# Patient Record
Sex: FEMALE | Race: WHITE | Employment: FULL TIME | URBAN - METROPOLITAN AREA
[De-identification: names, ages, dates, MRNs, and addresses within clinical notes are randomized per-mention and may not be internally consistent; named-entity substitution may affect disease eponyms.]

---

## 2017-04-11 PROBLEM — R29.898 WEAKNESS OF LEFT LEG: Status: ACTIVE | Noted: 2017-04-11

## 2017-05-22 PROBLEM — T85.848A PAIN FROM IMPLANTED HARDWARE, INITIAL ENCOUNTER: Status: ACTIVE | Noted: 2017-05-22

## 2017-09-07 PROBLEM — T85.848D PAIN FROM IMPLANTED HARDWARE, SUBSEQUENT ENCOUNTER: Status: ACTIVE | Noted: 2017-09-07

## 2018-02-11 ENCOUNTER — APPOINTMENT (OUTPATIENT)
Dept: CT IMAGING | Facility: HOSPITAL | Age: 38
End: 2018-02-11
Attending: EMERGENCY MEDICINE
Payer: MEDICAID

## 2018-02-11 ENCOUNTER — HOSPITAL ENCOUNTER (EMERGENCY)
Facility: HOSPITAL | Age: 38
Discharge: HOME | End: 2018-02-11
Attending: EMERGENCY MEDICINE
Payer: MEDICAID

## 2018-02-11 VITALS
OXYGEN SATURATION: 98 % | BODY MASS INDEX: 31.28 KG/M2 | HEART RATE: 82 BPM | HEIGHT: 62 IN | RESPIRATION RATE: 16 BRPM | TEMPERATURE: 99 F | DIASTOLIC BLOOD PRESSURE: 78 MMHG | SYSTOLIC BLOOD PRESSURE: 120 MMHG | WEIGHT: 170 LBS

## 2018-02-11 DIAGNOSIS — S16.1XXA STRAIN OF NECK MUSCLE, INITIAL ENCOUNTER: ICD-10-CM

## 2018-02-11 DIAGNOSIS — S02.2XXA CLOSED FRACTURE OF NASAL BONE, INITIAL ENCOUNTER: ICD-10-CM

## 2018-02-11 DIAGNOSIS — S40.021A CONTUSION OF MULTIPLE SITES OF RIGHT UPPER EXTREMITY, INITIAL ENCOUNTER: ICD-10-CM

## 2018-02-11 DIAGNOSIS — S80.11XA CONTUSION OF LOWER EXTREMITY, RIGHT, INITIAL ENCOUNTER: ICD-10-CM

## 2018-02-11 DIAGNOSIS — S00.83XA FACIAL CONTUSION, INITIAL ENCOUNTER: Primary | ICD-10-CM

## 2018-02-11 DIAGNOSIS — S39.012A LOW BACK STRAIN, INITIAL ENCOUNTER: ICD-10-CM

## 2018-02-11 LAB
POCT LOT NUMBER: NORMAL
POCT URINE PREGNANCY: NEGATIVE

## 2018-02-11 PROCEDURE — 70486 CT MAXILLOFACIAL W/O DYE: CPT | Performed by: EMERGENCY MEDICINE

## 2018-02-11 PROCEDURE — 99284 EMERGENCY DEPT VISIT MOD MDM: CPT

## 2018-02-11 PROCEDURE — 76377 3D RENDER W/INTRP POSTPROCES: CPT | Performed by: EMERGENCY MEDICINE

## 2018-02-11 PROCEDURE — 81025 URINE PREGNANCY TEST: CPT

## 2018-02-11 PROCEDURE — 70450 CT HEAD/BRAIN W/O DYE: CPT | Performed by: EMERGENCY MEDICINE
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
## 2018-03-09 ENCOUNTER — HOSPITAL (OUTPATIENT)
Dept: OTHER | Age: 38
End: 2018-03-09
Attending: EMERGENCY MEDICINE

## 2018-03-09 LAB
ALBUMIN SERPL-MCNC: 3.8 GM/DL (ref 3.6–5.1)
ALBUMIN/GLOB SERPL: 1 {RATIO} (ref 1–2.4)
ALP SERPL-CCNC: 56 UNIT/L (ref 45–117)
ALT SERPL-CCNC: 23 UNIT/L
AMORPH SED URNS QL MICRO: ABNORMAL
ANALYZER ANC (IANC): NORMAL
ANION GAP SERPL CALC-SCNC: 14 MMOL/L (ref 10–20)
APPEARANCE UR: ABNORMAL
AST SERPL-CCNC: 21 UNIT/L
BACTERIA #/AREA URNS HPF: ABNORMAL /HPF
BASOPHILS # BLD: 0 THOUSAND/MCL (ref 0–0.3)
BASOPHILS NFR BLD: 0 %
BILIRUB SERPL-MCNC: 0.5 MG/DL (ref 0.2–1)
BILIRUB UR QL STRIP: ABNORMAL
BILIRUB UR QL STRIP: ABNORMAL
BILIRUB UR QL: NEGATIVE
BUN SERPL-MCNC: 10 MG/DL (ref 6–20)
BUN/CREAT SERPL: 11 (ref 7–25)
CALCIUM SERPL-MCNC: 8.9 MG/DL (ref 8.4–10.2)
CAOX CRY URNS QL MICRO: ABNORMAL
CHLORIDE: 105 MMOL/L (ref 98–107)
CO2 SERPL-SCNC: 24 MMOL/L (ref 21–32)
COLOR UR: ABNORMAL
CREAT SERPL-MCNC: 0.91 MG/DL (ref 0.51–0.95)
DIFFERENTIAL METHOD BLD: NORMAL
EOSINOPHIL # BLD: 0.1 THOUSAND/MCL (ref 0.1–0.5)
EOSINOPHIL NFR BLD: 1 %
EPITH CASTS #/AREA URNS LPF: ABNORMAL /[LPF]
ERYTHROCYTE [DISTWIDTH] IN BLOOD: 11.9 % (ref 11–15)
FATTY CASTS #/AREA URNS LPF: ABNORMAL /[LPF]
GLOBULIN SER-MCNC: 3.7 GM/DL (ref 2–4)
GLUCOSE SERPL-MCNC: 86 MG/DL (ref 65–99)
GLUCOSE UR STRIP-MCNC: NEGATIVE MG/DL
GLUCOSE UR STRIP-MCNC: NEGATIVE MG/DL
GLUCOSE UR-MCNC: NEGATIVE MG/DL
GRAN CASTS #/AREA URNS LPF: ABNORMAL /[LPF]
HEMATOCRIT: 36.9 % (ref 36–46.5)
HEMOCCULT STL QL: ABNORMAL
HEMOCCULT STL QL: ABNORMAL
HGB BLD-MCNC: 13.1 GM/DL (ref 12–15.5)
HGB UR QL: ABNORMAL
HYALINE CASTS #/AREA URNS LPF: ABNORMAL /LPF (ref 0–5)
KETONES UR STRIP-MCNC: 40 MG/DL
KETONES UR STRIP-MCNC: 40 MG/DL
KETONES UR-MCNC: 20 MG/DL
LEUKOCYTE ESTERASE UR QL STRIP: ABNORMAL
LEUKOCYTE ESTERASE UR QL STRIP: ABNORMAL
LEUKOCYTE ESTERASE UR QL STRIP: NEGATIVE
LIPASE SERPL-CCNC: 176 UNIT/L (ref 73–393)
LYMPHOCYTES # BLD: 2.9 THOUSAND/MCL (ref 1–4.8)
LYMPHOCYTES NFR BLD: 41 %
MAGNESIUM SERPL-MCNC: 2.2 MG/DL (ref 1.7–2.4)
MCH RBC QN AUTO: 31 PG (ref 26–34)
MCHC RBC AUTO-ENTMCNC: 35.5 GM/DL (ref 32–36.5)
MCV RBC AUTO: 87.4 FL (ref 78–100)
MIXED CELL CASTS #/AREA URNS LPF: ABNORMAL /[LPF]
MONOCYTES # BLD: 0.4 THOUSAND/MCL (ref 0.3–0.9)
MONOCYTES NFR BLD: 6 %
MUCOUS THREADS URNS QL MICRO: PRESENT
NEUTROPHILS # BLD: 3.7 THOUSAND/MCL (ref 1.8–7.7)
NEUTROPHILS NFR BLD: 52 %
NEUTS SEG NFR BLD: NORMAL %
NITRITE UR QL STRIP: NEGATIVE
NITRITE UR QL STRIP: NEGATIVE
NITRITE UR QL: NEGATIVE
PERCENT NRBC: NORMAL
PH UR STRIP: 6.5 UNIT (ref 5–7)
PH UR STRIP: 6.5 UNIT (ref 5–7)
PH UR: 6 UNIT (ref 5–7)
PLATELET # BLD: 325 THOUSAND/MCL (ref 140–450)
POTASSIUM SERPL-SCNC: 3.1 MMOL/L (ref 3.4–5.1)
PROT SERPL-MCNC: 7.5 GM/DL (ref 6.4–8.2)
PROT UR QL: 30 MG/DL
PROT UR STRIP-MCNC: 30 MG/DL
PROT UR STRIP-MCNC: 30 MG/DL
RBC # BLD: 4.22 MILLION/MCL (ref 4–5.2)
RBC #/AREA URNS HPF: ABNORMAL /HPF (ref 0–3)
RBC CASTS #/AREA URNS LPF: ABNORMAL /[LPF]
RENAL EPI CELLS #/AREA URNS HPF: ABNORMAL /[HPF]
SODIUM SERPL-SCNC: 140 MMOL/L (ref 135–145)
SP GR UR STRIP: 1.02 (ref 1–1.03)
SP GR UR STRIP: 1.02 (ref 1–1.03)
SP GR UR: 1.02 (ref 1–1.03)
SPECIMEN SOURCE: ABNORMAL
SPERM URNS QL MICRO: ABNORMAL
SQUAMOUS #/AREA URNS HPF: ABNORMAL /HPF (ref 0–5)
T VAGINALIS URNS QL MICRO: ABNORMAL
TRI-PHOS CRY URNS QL MICRO: ABNORMAL
URATE CRY URNS QL MICRO: ABNORMAL
URINE REFLEX: ABNORMAL
URNS CMNT MICRO: ABNORMAL
UROBILINOGEN UR QL: 0.2 MG/DL (ref 0–1)
UROBILINOGEN UR STRIP-MCNC: 0.2 MG/DL (ref 0–1)
UROBILINOGEN UR STRIP-MCNC: 0.2 MG/DL (ref 0–1)
WAXY CASTS #/AREA URNS LPF: ABNORMAL /[LPF]
WBC # BLD: 7 THOUSAND/MCL (ref 4.2–11)
WBC #/AREA URNS HPF: ABNORMAL /HPF (ref 0–5)
WBC CASTS #/AREA URNS LPF: ABNORMAL /[LPF]
YEAST HYPHAE URNS QL MICRO: ABNORMAL
YEAST URNS QL MICRO: ABNORMAL

## 2018-03-28 ENCOUNTER — HOSPITAL ENCOUNTER (EMERGENCY)
Facility: HOSPITAL | Age: 38
Discharge: HOME | End: 2018-03-28
Attending: EMERGENCY MEDICINE
Payer: MEDICAID

## 2018-03-28 ENCOUNTER — APPOINTMENT (OUTPATIENT)
Dept: CT IMAGING | Facility: HOSPITAL | Age: 38
End: 2018-03-28
Attending: EMERGENCY MEDICINE
Payer: MEDICAID

## 2018-03-28 ENCOUNTER — APPOINTMENT (OUTPATIENT)
Dept: ULTRASOUND IMAGING | Facility: HOSPITAL | Age: 38
End: 2018-03-28
Attending: EMERGENCY MEDICINE
Payer: MEDICAID

## 2018-03-28 VITALS
OXYGEN SATURATION: 96 % | HEIGHT: 62 IN | BODY MASS INDEX: 29.44 KG/M2 | HEART RATE: 80 BPM | WEIGHT: 160 LBS | DIASTOLIC BLOOD PRESSURE: 97 MMHG | SYSTOLIC BLOOD PRESSURE: 127 MMHG | TEMPERATURE: 98 F | RESPIRATION RATE: 18 BRPM

## 2018-03-28 DIAGNOSIS — R10.32 ABDOMINAL PAIN, LEFT LOWER QUADRANT: Primary | ICD-10-CM

## 2018-03-28 LAB
ALBUMIN SERPL BCP-MCNC: 4.5 G/DL (ref 3.5–4.8)
ALP SERPL-CCNC: 48 U/L (ref 32–100)
ALT SERPL-CCNC: 16 U/L (ref 14–54)
ANION GAP SERPL CALC-SCNC: 11 MMOL/L (ref 0–18)
AST SERPL-CCNC: 22 U/L (ref 15–41)
B-HCG UR QL: NEGATIVE
BASOPHILS # BLD: 0 K/UL (ref 0–0.2)
BASOPHILS NFR BLD: 1 %
BILIRUB DIRECT SERPL-MCNC: 0.2 MG/DL (ref 0–0.2)
BILIRUB SERPL-MCNC: 0.8 MG/DL (ref 0.3–1.2)
BILIRUB UR QL: NEGATIVE
BUN SERPL-MCNC: 7 MG/DL (ref 8–20)
BUN/CREAT SERPL: 7.6 (ref 10–20)
CALCIUM SERPL-MCNC: 9.3 MG/DL (ref 8.5–10.5)
CHLORIDE SERPL-SCNC: 105 MMOL/L (ref 95–110)
CLARITY UR: CLEAR
CO2 SERPL-SCNC: 21 MMOL/L (ref 22–32)
COLOR UR: YELLOW
CREAT SERPL-MCNC: 0.92 MG/DL (ref 0.5–1.5)
EOSINOPHIL # BLD: 0.1 K/UL (ref 0–0.7)
EOSINOPHIL NFR BLD: 1 %
ERYTHROCYTE [DISTWIDTH] IN BLOOD BY AUTOMATED COUNT: 12.6 % (ref 11–15)
GLUCOSE SERPL-MCNC: 90 MG/DL (ref 70–99)
GLUCOSE UR-MCNC: NEGATIVE MG/DL
HCT VFR BLD AUTO: 39.1 % (ref 35–48)
HGB BLD-MCNC: 13.8 G/DL (ref 12–16)
KETONES UR-MCNC: NEGATIVE MG/DL
LEUKOCYTE ESTERASE UR QL STRIP.AUTO: NEGATIVE
LYMPHOCYTES # BLD: 1.8 K/UL (ref 1–4)
LYMPHOCYTES NFR BLD: 24 %
MCH RBC QN AUTO: 30.6 PG (ref 27–32)
MCHC RBC AUTO-ENTMCNC: 35.2 G/DL (ref 32–37)
MCV RBC AUTO: 86.8 FL (ref 80–100)
MONOCYTES # BLD: 0.4 K/UL (ref 0–1)
MONOCYTES NFR BLD: 5 %
NEUTROPHILS # BLD AUTO: 5.2 K/UL (ref 1.8–7.7)
NEUTROPHILS NFR BLD: 69 %
NITRITE UR QL STRIP.AUTO: NEGATIVE
OSMOLALITY UR CALC.SUM OF ELEC: 282 MOSM/KG (ref 275–295)
PH UR: 6 [PH] (ref 5–8)
PLATELET # BLD AUTO: 359 K/UL (ref 140–400)
PMV BLD AUTO: 8.8 FL (ref 7.4–10.3)
POTASSIUM SERPL-SCNC: 3.8 MMOL/L (ref 3.3–5.1)
PROT SERPL-MCNC: 7.3 G/DL (ref 5.9–8.4)
PROT UR-MCNC: NEGATIVE MG/DL
RBC # BLD AUTO: 4.51 M/UL (ref 3.7–5.4)
RBC #/AREA URNS AUTO: 6 /HPF
SODIUM SERPL-SCNC: 137 MMOL/L (ref 136–144)
SP GR UR STRIP: 1.01 (ref 1–1.03)
UROBILINOGEN UR STRIP-ACNC: <2
VIT C UR-MCNC: NEGATIVE MG/DL
WBC # BLD AUTO: 7.5 K/UL (ref 4–11)
WBC #/AREA URNS AUTO: 1 /HPF

## 2018-03-28 PROCEDURE — 80048 BASIC METABOLIC PNL TOTAL CA: CPT | Performed by: EMERGENCY MEDICINE

## 2018-03-28 PROCEDURE — 99284 EMERGENCY DEPT VISIT MOD MDM: CPT

## 2018-03-28 PROCEDURE — 96374 THER/PROPH/DIAG INJ IV PUSH: CPT

## 2018-03-28 PROCEDURE — 74176 CT ABD & PELVIS W/O CONTRAST: CPT | Performed by: EMERGENCY MEDICINE

## 2018-03-28 PROCEDURE — 76830 TRANSVAGINAL US NON-OB: CPT | Performed by: EMERGENCY MEDICINE

## 2018-03-28 PROCEDURE — 76856 US EXAM PELVIC COMPLETE: CPT | Performed by: EMERGENCY MEDICINE

## 2018-03-28 PROCEDURE — 81025 URINE PREGNANCY TEST: CPT

## 2018-03-28 PROCEDURE — 81001 URINALYSIS AUTO W/SCOPE: CPT | Performed by: EMERGENCY MEDICINE

## 2018-03-28 PROCEDURE — 80076 HEPATIC FUNCTION PANEL: CPT | Performed by: EMERGENCY MEDICINE

## 2018-03-28 PROCEDURE — 93975 VASCULAR STUDY: CPT | Performed by: EMERGENCY MEDICINE

## 2018-03-28 PROCEDURE — 96375 TX/PRO/DX INJ NEW DRUG ADDON: CPT

## 2018-03-28 PROCEDURE — 85025 COMPLETE CBC W/AUTO DIFF WBC: CPT | Performed by: EMERGENCY MEDICINE

## 2018-03-28 RX ORDER — CYCLOBENZAPRINE HCL 10 MG
10 TABLET ORAL 3 TIMES DAILY PRN
Qty: 20 TABLET | Refills: 0 | Status: SHIPPED | OUTPATIENT
Start: 2018-03-28 | End: 2018-04-04

## 2018-03-28 RX ORDER — MORPHINE SULFATE 4 MG/ML
4 INJECTION, SOLUTION INTRAMUSCULAR; INTRAVENOUS ONCE
Status: COMPLETED | OUTPATIENT
Start: 2018-03-28 | End: 2018-03-28

## 2018-03-28 RX ORDER — TRAMADOL HYDROCHLORIDE 50 MG/1
50 TABLET ORAL EVERY 8 HOURS PRN
Qty: 15 TABLET | Refills: 0 | Status: SHIPPED | OUTPATIENT
Start: 2018-03-28 | End: 2018-04-04

## 2018-03-28 RX ORDER — KETOROLAC TROMETHAMINE 30 MG/ML
30 INJECTION, SOLUTION INTRAMUSCULAR; INTRAVENOUS ONCE
Status: COMPLETED | OUTPATIENT
Start: 2018-03-28 | End: 2018-03-28

## 2018-03-28 RX ORDER — MORPHINE SULFATE 4 MG/ML
INJECTION, SOLUTION INTRAMUSCULAR; INTRAVENOUS
Status: COMPLETED
Start: 2018-03-28 | End: 2018-03-28

## 2018-03-28 NOTE — ED PROVIDER NOTES
Patient Seen in: [facility] AND [facility] Emergency Department    History   Patient presents with:  Abdomen/Flank Pain (GI/)    Stated Complaint: Flank pain    HPI    Patient is a 69-year-old female who states that she had this sudden onset today of left lowe Triage Vitals [03/28/18 1410]  BP: 132/90  Pulse: 96  Resp: 18  Temp: 98 °F (36.7 °C)  Temp src: n/a  SpO2: 98 %  O2 Device: n/a    Current:/90   Pulse 96   Temp 98 °F (36.7 °C)   Resp 18   Ht 157.5 cm (5' 2\")   Wt 72.6 kg   LMP 03/27/2018   SpO2 98 PREGNANCY URINE - Normal   CBC WITH DIFFERENTIAL WITH PLATELET    Narrative: The following orders were created for panel order CBC WITH DIFFERENTIAL WITH PLATELET.   Procedure                               Abnormality         Status Prescribed:  Current Discharge Medication List

## 2018-03-28 NOTE — ED NOTES
Pt presents to ED with a c/o sharp left abd pain radiating to left flank. Pain onset suddenly while at work. Reports feeling hot, diaphoretic, and nauseated at time of pain. Denies any  complaints.

## 2018-03-28 NOTE — ED PROVIDER NOTES
Patient signed out to me pending results of ultrasound which was normal.  Pain improved with holding pressure and worse with movement with straight leg raise and sitting up from lying position. Possible muscle strain.   Will give muscle relaxants patient t

## 2018-03-28 NOTE — ED INITIAL ASSESSMENT (HPI)
c/o abd pain/dizziness, \" a little bit\" of nausea, denies vomiting, state this started all of sudden today whilst at work

## 2018-04-14 ENCOUNTER — HOSPITAL (OUTPATIENT)
Dept: OTHER | Age: 38
End: 2018-04-14
Attending: EMERGENCY MEDICINE

## 2018-04-21 ENCOUNTER — APPOINTMENT (OUTPATIENT)
Dept: GENERAL RADIOLOGY | Facility: HOSPITAL | Age: 38
End: 2018-04-21
Payer: MEDICAID

## 2018-04-21 ENCOUNTER — HOSPITAL ENCOUNTER (EMERGENCY)
Facility: HOSPITAL | Age: 38
Discharge: HOME | End: 2018-04-22
Payer: MEDICAID

## 2018-04-21 VITALS
DIASTOLIC BLOOD PRESSURE: 85 MMHG | WEIGHT: 158 LBS | RESPIRATION RATE: 18 BRPM | TEMPERATURE: 99 F | HEART RATE: 80 BPM | BODY MASS INDEX: 29.08 KG/M2 | SYSTOLIC BLOOD PRESSURE: 108 MMHG | HEIGHT: 62 IN | OXYGEN SATURATION: 97 %

## 2018-04-21 DIAGNOSIS — S20.212A RIB CONTUSION, LEFT, INITIAL ENCOUNTER: Primary | ICD-10-CM

## 2018-04-21 PROCEDURE — 71101 X-RAY EXAM UNILAT RIBS/CHEST: CPT

## 2018-04-21 PROCEDURE — 99283 EMERGENCY DEPT VISIT LOW MDM: CPT

## 2018-04-21 PROCEDURE — 96372 THER/PROPH/DIAG INJ SC/IM: CPT

## 2018-04-21 RX ORDER — KETOROLAC TROMETHAMINE 30 MG/ML
60 INJECTION, SOLUTION INTRAMUSCULAR; INTRAVENOUS ONCE
Status: COMPLETED | OUTPATIENT
Start: 2018-04-21 | End: 2018-04-21

## 2018-04-21 RX ORDER — HYDROCODONE BITARTRATE AND ACETAMINOPHEN 5; 325 MG/1; MG/1
1 TABLET ORAL ONCE
Status: COMPLETED | OUTPATIENT
Start: 2018-04-21 | End: 2018-04-21

## 2018-04-21 RX ORDER — LIDOCAINE 50 MG/G
1 PATCH TOPICAL ONCE
Status: DISCONTINUED | OUTPATIENT
Start: 2018-04-21 | End: 2018-04-22

## 2018-04-22 RX ORDER — LIDOCAINE 50 MG/G
1 PATCH TOPICAL EVERY 24 HOURS
Qty: 7 PATCH | Refills: 0 | Status: SHIPPED | OUTPATIENT
Start: 2018-04-22 | End: 2018-04-29

## 2018-04-22 RX ORDER — HYDROCODONE BITARTRATE AND ACETAMINOPHEN 5; 325 MG/1; MG/1
1 TABLET ORAL EVERY 6 HOURS PRN
Qty: 12 TABLET | Refills: 0 | Status: SHIPPED | OUTPATIENT
Start: 2018-04-22 | End: 2018-04-25

## 2018-04-22 RX ORDER — MELOXICAM 7.5 MG/1
7.5 TABLET ORAL DAILY
Qty: 14 TABLET | Refills: 0 | Status: SHIPPED | OUTPATIENT
Start: 2018-04-22 | End: 2018-05-06

## 2018-04-22 NOTE — ED PROVIDER NOTES
Patient Seen in: [facility] AND [facility] Emergency Department    History   Patient presents with:  Fall    Stated Complaint: fall down 4 stairs and landed on laundry basket to left ribs     HPI    45 yo F without PMH presenting for evaluation of left lateral CW disturbance. Respiratory: Negative for cough and shortness of breath. Cardiovascular: (+) chest pain. Positive for stated complaint: fall down 4 stairs and landed on laundry basket to left ribs  Other systems are as noted in HPI.   Constitutional an notified that any copying, distribution, dissemination or action taken in relation to the contents of this report is strictly prohibited and may be unlawful.  If you have received this report in error, please notify the sender immediately at [phone] an

## 2018-04-22 NOTE — ED INITIAL ASSESSMENT (HPI)
Pt fell down 4 stairs onto edge of laundry basket, struck left ribs, has pain to left side of abd. PT has pain with inspiration. Lungs fields equal expansion to auscultation at time of triage.

## 2018-04-22 NOTE — ED NOTES
Patient arrives with complaints of left sided rib pain s/p fall down the stairs, approximately four steps. Per pt, states she was carrying a laundry basket down the stairs where she lost her balance, causing her to fall.  Patient states she \"landed on the

## 2018-05-19 ENCOUNTER — HOSPITAL (OUTPATIENT)
Dept: OTHER | Age: 38
End: 2018-05-19
Attending: EMERGENCY MEDICINE

## 2018-06-04 ENCOUNTER — HOSPITAL ENCOUNTER (EMERGENCY)
Facility: HOSPITAL | Age: 38
Discharge: HOME | End: 2018-06-04
Attending: EMERGENCY MEDICINE
Payer: MEDICAID

## 2018-06-04 ENCOUNTER — APPOINTMENT (OUTPATIENT)
Dept: CT IMAGING | Facility: HOSPITAL | Age: 38
End: 2018-06-04
Attending: EMERGENCY MEDICINE
Payer: MEDICAID

## 2018-06-04 VITALS
HEIGHT: 62 IN | BODY MASS INDEX: 29.44 KG/M2 | HEART RATE: 55 BPM | RESPIRATION RATE: 16 BRPM | TEMPERATURE: 98 F | DIASTOLIC BLOOD PRESSURE: 66 MMHG | WEIGHT: 160 LBS | OXYGEN SATURATION: 98 % | SYSTOLIC BLOOD PRESSURE: 106 MMHG

## 2018-06-04 DIAGNOSIS — K52.9 GASTROENTERITIS: ICD-10-CM

## 2018-06-04 DIAGNOSIS — R10.9 ABDOMINAL PAIN, ACUTE: Primary | ICD-10-CM

## 2018-06-04 PROCEDURE — 99284 EMERGENCY DEPT VISIT MOD MDM: CPT

## 2018-06-04 PROCEDURE — 87086 URINE CULTURE/COLONY COUNT: CPT | Performed by: EMERGENCY MEDICINE

## 2018-06-04 PROCEDURE — 96376 TX/PRO/DX INJ SAME DRUG ADON: CPT

## 2018-06-04 PROCEDURE — 81025 URINE PREGNANCY TEST: CPT

## 2018-06-04 PROCEDURE — 96374 THER/PROPH/DIAG INJ IV PUSH: CPT

## 2018-06-04 PROCEDURE — 81001 URINALYSIS AUTO W/SCOPE: CPT | Performed by: EMERGENCY MEDICINE

## 2018-06-04 PROCEDURE — 85025 COMPLETE CBC W/AUTO DIFF WBC: CPT | Performed by: EMERGENCY MEDICINE

## 2018-06-04 PROCEDURE — 96375 TX/PRO/DX INJ NEW DRUG ADDON: CPT

## 2018-06-04 PROCEDURE — 74176 CT ABD & PELVIS W/O CONTRAST: CPT | Performed by: EMERGENCY MEDICINE

## 2018-06-04 PROCEDURE — 80048 BASIC METABOLIC PNL TOTAL CA: CPT | Performed by: EMERGENCY MEDICINE

## 2018-06-04 PROCEDURE — 96361 HYDRATE IV INFUSION ADD-ON: CPT

## 2018-06-04 RX ORDER — ONDANSETRON 2 MG/ML
INJECTION INTRAMUSCULAR; INTRAVENOUS
Status: COMPLETED
Start: 2018-06-04 | End: 2018-06-04

## 2018-06-04 RX ORDER — KETOROLAC TROMETHAMINE 15 MG/ML
15 INJECTION, SOLUTION INTRAMUSCULAR; INTRAVENOUS ONCE
Status: COMPLETED | OUTPATIENT
Start: 2018-06-04 | End: 2018-06-04

## 2018-06-04 RX ORDER — KETOROLAC TROMETHAMINE 15 MG/ML
INJECTION, SOLUTION INTRAMUSCULAR; INTRAVENOUS
Status: COMPLETED
Start: 2018-06-04 | End: 2018-06-04

## 2018-06-04 RX ORDER — ONDANSETRON 4 MG/1
4 TABLET, ORALLY DISINTEGRATING ORAL EVERY 6 HOURS PRN
Qty: 10 TABLET | Refills: 0 | Status: SHIPPED | OUTPATIENT
Start: 2018-06-04 | End: 2018-06-11

## 2018-06-04 RX ORDER — ONDANSETRON 2 MG/ML
4 INJECTION INTRAMUSCULAR; INTRAVENOUS ONCE
Status: COMPLETED | OUTPATIENT
Start: 2018-06-04 | End: 2018-06-04

## 2018-06-04 NOTE — ED NOTES
LEFT FLANK PAIN, N/V, AND PRESSURE SENSATION WHEN URINATING. S/S ONSET 2 DAYS AGO. HX OF KIDNEY STONES AND PAIN TO LEFT FLANK.

## 2018-06-04 NOTE — ED PROVIDER NOTES
Patient Seen in: [facility] AND [facility] Emergency Department    History   No chief complaint on file.     Stated Complaint: Abdomen/ Flank Pain/ Diarrhea    HPI    78-year-old female presents the emergency department with 2 days of left flank pain that radiate °C)  Temp src: Temporal  SpO2: 98 %  O2 Device: None (Room air)    Current:/66   Pulse 55   Temp 97.8 °F (36.6 °C) (Temporal)   Resp 16   Ht 157.5 cm (5' 2\")   Wt 72.6 kg   LMP 05/20/2018 (Approximate)   SpO2 98%   BMI 29.26 kg/m²         Physical E -----------         ------                     CBC W/ DIFFERENTIAL[[id number]]                              Final result                 Please view results for these tests on the individual orders.    RAINBOW DRAW BLUE   RAINBOW DRAW LAVENDER

## 2018-06-04 NOTE — ED NOTES
MD updated with pt c/o persistent left flank pain. Order for repeat toradol dose received and carried out.

## 2019-05-02 ENCOUNTER — APPOINTMENT (OUTPATIENT)
Dept: GENERAL RADIOLOGY | Facility: HOSPITAL | Age: 39
End: 2019-05-02
Attending: NURSE PRACTITIONER
Payer: MEDICAID

## 2019-05-02 ENCOUNTER — HOSPITAL ENCOUNTER (EMERGENCY)
Facility: HOSPITAL | Age: 39
Discharge: HOME | End: 2019-05-02
Attending: STUDENT IN AN ORGANIZED HEALTH CARE EDUCATION/TRAINING PROGRAM
Payer: MEDICAID

## 2019-05-02 VITALS
WEIGHT: 150 LBS | SYSTOLIC BLOOD PRESSURE: 119 MMHG | DIASTOLIC BLOOD PRESSURE: 79 MMHG | RESPIRATION RATE: 16 BRPM | HEART RATE: 102 BPM | HEIGHT: 61 IN | TEMPERATURE: 98 F | OXYGEN SATURATION: 98 % | BODY MASS INDEX: 28.32 KG/M2

## 2019-05-02 DIAGNOSIS — M54.30 ACUTE SCIATICA: Primary | ICD-10-CM

## 2019-05-02 PROCEDURE — 72110 X-RAY EXAM L-2 SPINE 4/>VWS: CPT | Performed by: NURSE PRACTITIONER

## 2019-05-02 PROCEDURE — 99284 EMERGENCY DEPT VISIT MOD MDM: CPT

## 2019-05-02 PROCEDURE — 96372 THER/PROPH/DIAG INJ SC/IM: CPT

## 2019-05-02 RX ORDER — HYDROCODONE BITARTRATE AND ACETAMINOPHEN 5; 325 MG/1; MG/1
1 TABLET ORAL ONCE
Status: DISCONTINUED | OUTPATIENT
Start: 2019-05-02 | End: 2019-05-02

## 2019-05-02 RX ORDER — METHYLPREDNISOLONE 4 MG/1
TABLET ORAL
Qty: 1 PACKAGE | Refills: 0 | Status: SHIPPED | OUTPATIENT
Start: 2019-05-02 | End: 2019-05-07

## 2019-05-02 RX ORDER — DIAZEPAM 5 MG/1
5 TABLET ORAL ONCE
Status: DISCONTINUED | OUTPATIENT
Start: 2019-05-02 | End: 2019-05-02

## 2019-05-02 RX ORDER — KETOROLAC TROMETHAMINE 30 MG/ML
30 INJECTION, SOLUTION INTRAMUSCULAR; INTRAVENOUS ONCE
Status: COMPLETED | OUTPATIENT
Start: 2019-05-02 | End: 2019-05-02

## 2019-05-02 RX ORDER — DIAZEPAM 5 MG/1
5 TABLET ORAL ONCE
Status: COMPLETED | OUTPATIENT
Start: 2019-05-02 | End: 2019-05-02

## 2019-05-02 RX ORDER — ONDANSETRON 4 MG/1
4 TABLET, ORALLY DISINTEGRATING ORAL ONCE
Status: COMPLETED | OUTPATIENT
Start: 2019-05-02 | End: 2019-05-02

## 2019-05-02 RX ORDER — HYDROCODONE BITARTRATE AND ACETAMINOPHEN 5; 325 MG/1; MG/1
1 TABLET ORAL EVERY 6 HOURS PRN
Qty: 10 TABLET | Refills: 0 | Status: ON HOLD | OUTPATIENT
Start: 2019-05-02 | End: 2020-06-13

## 2019-05-02 RX ORDER — DIAZEPAM 5 MG/1
5 TABLET ORAL 3 TIMES DAILY PRN
Qty: 20 TABLET | Refills: 0 | Status: SHIPPED | OUTPATIENT
Start: 2019-05-02 | End: 2019-05-09

## 2019-05-02 RX ORDER — HYDROMORPHONE HYDROCHLORIDE 1 MG/ML
1 INJECTION, SOLUTION INTRAMUSCULAR; INTRAVENOUS; SUBCUTANEOUS ONCE
Status: COMPLETED | OUTPATIENT
Start: 2019-05-02 | End: 2019-05-02

## 2019-05-03 NOTE — ED PROVIDER NOTES
Patient Seen in: [facility] Emergency Department    History   Patient presents with:  Back Pain (musculoskeletal)    Stated Complaint: R low back pain, no injury     27-year-old female presents today with complaints of right lower back pain which sh insertion removed   • OTHER SURGICAL HISTORY  2008    lithotripsy   • OTHER SURGICAL HISTORY      ashlie breast bxs \"neg\"   • OTHER SURGICAL HISTORY  11/2015    Patellofemoral ligament reconstruction and tibial tubercle transfer   • REMOVAL HARDWARE LOWER E Neurological: She is alert and oriented to person, place, and time. She has normal strength. No sensory deficit. GCS eye subscore is 4. GCS verbal subscore is 5. GCS motor subscore is 6. Good dorsiflexion bilateral great toes.   Normal straight leg rais sedation. Encouraged to use sparingly. Patient encouraged to alternate heat and ice to area of soreness and to do stretching exercises throughout the day.   To follow-up with primary MD or will give referral for spine to follow-up with if symptoms do not

## 2019-09-20 ENCOUNTER — HOSPITAL ENCOUNTER (EMERGENCY)
Facility: HOSPITAL | Age: 39
Discharge: HOME | End: 2019-09-20
Attending: EMERGENCY MEDICINE
Payer: MEDICAID

## 2019-09-20 VITALS
HEIGHT: 61 IN | SYSTOLIC BLOOD PRESSURE: 117 MMHG | OXYGEN SATURATION: 98 % | HEART RATE: 84 BPM | RESPIRATION RATE: 13 BRPM | BODY MASS INDEX: 30.21 KG/M2 | DIASTOLIC BLOOD PRESSURE: 100 MMHG | WEIGHT: 160 LBS | TEMPERATURE: 98 F

## 2019-09-20 DIAGNOSIS — T78.40XA ALLERGIC REACTION, INITIAL ENCOUNTER: Primary | ICD-10-CM

## 2019-09-20 LAB
ATRIAL RATE: 87 BPM
P AXIS: 60 DEGREES
P-R INTERVAL: 146 MS
Q-T INTERVAL: 386 MS
QRS DURATION: 86 MS
QTC CALCULATION (BEZET): 464 MS
R AXIS: 25 DEGREES
T AXIS: 31 DEGREES
VENTRICULAR RATE: 87 BPM

## 2019-09-20 PROCEDURE — 99284 EMERGENCY DEPT VISIT MOD MDM: CPT

## 2019-09-20 PROCEDURE — 96361 HYDRATE IV INFUSION ADD-ON: CPT

## 2019-09-20 PROCEDURE — S0028 INJECTION, FAMOTIDINE, 20 MG: HCPCS | Performed by: EMERGENCY MEDICINE

## 2019-09-20 PROCEDURE — 96375 TX/PRO/DX INJ NEW DRUG ADDON: CPT

## 2019-09-20 PROCEDURE — 93005 ELECTROCARDIOGRAM TRACING: CPT

## 2019-09-20 PROCEDURE — 96374 THER/PROPH/DIAG INJ IV PUSH: CPT

## 2019-09-20 PROCEDURE — 93010 ELECTROCARDIOGRAM REPORT: CPT

## 2019-09-20 RX ORDER — PREDNISONE 20 MG/1
40 TABLET ORAL DAILY
Qty: 10 TABLET | Refills: 0 | Status: SHIPPED | OUTPATIENT
Start: 2019-09-20 | End: 2019-09-25

## 2019-09-20 RX ORDER — FAMOTIDINE 10 MG/ML
20 INJECTION, SOLUTION INTRAVENOUS ONCE
Status: COMPLETED | OUTPATIENT
Start: 2019-09-20 | End: 2019-09-20

## 2019-09-20 RX ORDER — METHYLPREDNISOLONE SODIUM SUCCINATE 125 MG/2ML
125 INJECTION, POWDER, LYOPHILIZED, FOR SOLUTION INTRAMUSCULAR; INTRAVENOUS ONCE
Status: COMPLETED | OUTPATIENT
Start: 2019-09-20 | End: 2019-09-20

## 2019-09-20 RX ORDER — EPINEPHRINE 0.3 MG/.3ML
0.3 INJECTION SUBCUTANEOUS
Qty: 1 EACH | Refills: 0 | Status: SHIPPED | OUTPATIENT
Start: 2019-09-20 | End: 2019-10-20

## 2019-09-20 RX ORDER — SODIUM CHLORIDE 9 MG/ML
1000 INJECTION, SOLUTION INTRAVENOUS ONCE
Status: COMPLETED | OUTPATIENT
Start: 2019-09-20 | End: 2019-09-20

## 2019-09-20 NOTE — ED INITIAL ASSESSMENT (HPI)
Pt here due to an allergic reaction. Pt is allergic to dairy and was served a mediatation salad. Pt started to have tightness of the throat and around the lips was a burning sensation. Pt was administered epi pen and then benadryl by the medics.
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
## 2019-09-29 ENCOUNTER — HOSPITAL (OUTPATIENT)
Dept: OTHER | Age: 39
End: 2019-09-29

## 2019-11-13 ENCOUNTER — HOSPITAL (OUTPATIENT)
Dept: OTHER | Age: 39
End: 2019-11-13

## 2019-12-24 ENCOUNTER — HOSPITAL (OUTPATIENT)
Dept: OTHER | Age: 39
End: 2019-12-24

## 2020-03-10 ENCOUNTER — HOSPITAL ENCOUNTER (EMERGENCY)
Facility: HOSPITAL | Age: 40
Discharge: HOME | End: 2020-03-10
Attending: EMERGENCY MEDICINE
Payer: MEDICAID

## 2020-03-10 VITALS
DIASTOLIC BLOOD PRESSURE: 78 MMHG | TEMPERATURE: 99 F | SYSTOLIC BLOOD PRESSURE: 116 MMHG | BODY MASS INDEX: 33.99 KG/M2 | WEIGHT: 180 LBS | RESPIRATION RATE: 22 BRPM | OXYGEN SATURATION: 97 % | HEART RATE: 65 BPM | HEIGHT: 61 IN

## 2020-03-10 DIAGNOSIS — N30.00 ACUTE CYSTITIS WITHOUT HEMATURIA: ICD-10-CM

## 2020-03-10 DIAGNOSIS — R19.7 DIARRHEA, UNSPECIFIED TYPE: Primary | ICD-10-CM

## 2020-03-10 LAB
ALBUMIN SERPL-MCNC: 3.6 G/DL (ref 3.4–5)
ALP LIVER SERPL-CCNC: 61 U/L (ref 37–98)
ALT SERPL-CCNC: 20 U/L (ref 13–56)
ANION GAP SERPL CALC-SCNC: 9 MMOL/L (ref 0–18)
AST SERPL-CCNC: 14 U/L (ref 15–37)
B-HCG UR QL: NEGATIVE
BASOPHILS # BLD AUTO: 0.06 X10(3) UL (ref 0–0.2)
BASOPHILS NFR BLD AUTO: 0.7 %
BILIRUB DIRECT SERPL-MCNC: <0.1 MG/DL (ref 0–0.2)
BILIRUB SERPL-MCNC: 0.2 MG/DL (ref 0.1–2)
BILIRUB UR QL: NEGATIVE
BUN BLD-MCNC: 10 MG/DL (ref 7–18)
BUN/CREAT SERPL: 11.6 (ref 10–20)
CALCIUM BLD-MCNC: 8.5 MG/DL (ref 8.5–10.1)
CHLORIDE SERPL-SCNC: 109 MMOL/L (ref 98–112)
CLARITY UR: CLEAR
CO2 SERPL-SCNC: 21 MMOL/L (ref 21–32)
COLOR UR: YELLOW
CREAT BLD-MCNC: 0.86 MG/DL (ref 0.55–1.02)
CRP SERPL-MCNC: <0.29 MG/DL (ref ?–0.3)
DEPRECATED RDW RBC AUTO: 39 FL (ref 35.1–46.3)
EOSINOPHIL # BLD AUTO: 0.15 X10(3) UL (ref 0–0.7)
EOSINOPHIL NFR BLD AUTO: 1.8 %
ERYTHROCYTE [DISTWIDTH] IN BLOOD BY AUTOMATED COUNT: 11.9 % (ref 11–15)
ERYTHROCYTE [SEDIMENTATION RATE] IN BLOOD: 11 MM/HR (ref 0–20)
GLUCOSE BLD-MCNC: 127 MG/DL (ref 70–99)
GLUCOSE UR-MCNC: NEGATIVE MG/DL
HCT VFR BLD AUTO: 38 % (ref 35–48)
HGB BLD-MCNC: 12.8 G/DL (ref 12–16)
IMM GRANULOCYTES # BLD AUTO: 0.02 X10(3) UL (ref 0–1)
IMM GRANULOCYTES NFR BLD: 0.2 %
KETONES UR-MCNC: NEGATIVE MG/DL
LIPASE SERPL-CCNC: 180 U/L (ref 73–393)
LYMPHOCYTES # BLD AUTO: 2.3 X10(3) UL (ref 1–4)
LYMPHOCYTES NFR BLD AUTO: 27.5 %
M PROTEIN MFR SERPL ELPH: 7 G/DL (ref 6.4–8.2)
MCH RBC QN AUTO: 30.5 PG (ref 26–34)
MCHC RBC AUTO-ENTMCNC: 33.7 G/DL (ref 31–37)
MCV RBC AUTO: 90.5 FL (ref 80–100)
MONOCYTES # BLD AUTO: 0.37 X10(3) UL (ref 0.1–1)
MONOCYTES NFR BLD AUTO: 4.4 %
NEUTROPHILS # BLD AUTO: 5.46 X10 (3) UL (ref 1.5–7.7)
NEUTROPHILS # BLD AUTO: 5.46 X10(3) UL (ref 1.5–7.7)
NEUTROPHILS NFR BLD AUTO: 65.4 %
NITRITE UR QL STRIP.AUTO: NEGATIVE
OSMOLALITY SERPL CALC.SUM OF ELEC: 289 MOSM/KG (ref 275–295)
PH UR: 6 [PH] (ref 5–8)
PLATELET # BLD AUTO: 292 10(3)UL (ref 150–450)
POTASSIUM SERPL-SCNC: 3.3 MMOL/L (ref 3.5–5.1)
PROT UR-MCNC: NEGATIVE MG/DL
RBC # BLD AUTO: 4.2 X10(6)UL (ref 3.8–5.3)
RBC #/AREA URNS AUTO: 2 /HPF
SODIUM SERPL-SCNC: 139 MMOL/L (ref 136–145)
SP GR UR STRIP: 1.02 (ref 1–1.03)
UROBILINOGEN UR STRIP-ACNC: <2
WBC # BLD AUTO: 8.4 X10(3) UL (ref 4–11)
WBC #/AREA URNS AUTO: 2 /HPF

## 2020-03-10 PROCEDURE — 86140 C-REACTIVE PROTEIN: CPT | Performed by: EMERGENCY MEDICINE

## 2020-03-10 PROCEDURE — 81025 URINE PREGNANCY TEST: CPT

## 2020-03-10 PROCEDURE — 96375 TX/PRO/DX INJ NEW DRUG ADDON: CPT

## 2020-03-10 PROCEDURE — 99284 EMERGENCY DEPT VISIT MOD MDM: CPT

## 2020-03-10 PROCEDURE — 85025 COMPLETE CBC W/AUTO DIFF WBC: CPT | Performed by: EMERGENCY MEDICINE

## 2020-03-10 PROCEDURE — 80048 BASIC METABOLIC PNL TOTAL CA: CPT | Performed by: EMERGENCY MEDICINE

## 2020-03-10 PROCEDURE — 80076 HEPATIC FUNCTION PANEL: CPT | Performed by: EMERGENCY MEDICINE

## 2020-03-10 PROCEDURE — 96374 THER/PROPH/DIAG INJ IV PUSH: CPT

## 2020-03-10 PROCEDURE — 85652 RBC SED RATE AUTOMATED: CPT | Performed by: EMERGENCY MEDICINE

## 2020-03-10 PROCEDURE — 83690 ASSAY OF LIPASE: CPT | Performed by: EMERGENCY MEDICINE

## 2020-03-10 PROCEDURE — 96361 HYDRATE IV INFUSION ADD-ON: CPT

## 2020-03-10 PROCEDURE — 81001 URINALYSIS AUTO W/SCOPE: CPT | Performed by: EMERGENCY MEDICINE

## 2020-03-10 RX ORDER — MORPHINE SULFATE 4 MG/ML
4 INJECTION, SOLUTION INTRAMUSCULAR; INTRAVENOUS ONCE
Status: COMPLETED | OUTPATIENT
Start: 2020-03-10 | End: 2020-03-10

## 2020-03-10 RX ORDER — CIPROFLOXACIN 500 MG/1
500 TABLET, FILM COATED ORAL 2 TIMES DAILY
Qty: 10 TABLET | Refills: 0 | Status: SHIPPED | OUTPATIENT
Start: 2020-03-10 | End: 2020-03-15

## 2020-03-10 RX ORDER — ONDANSETRON 4 MG/1
4 TABLET, ORALLY DISINTEGRATING ORAL EVERY 4 HOURS PRN
Qty: 14 TABLET | Refills: 0 | Status: SHIPPED | OUTPATIENT
Start: 2020-03-10 | End: 2020-03-17

## 2020-03-10 RX ORDER — ONDANSETRON 2 MG/ML
4 INJECTION INTRAMUSCULAR; INTRAVENOUS ONCE
Status: COMPLETED | OUTPATIENT
Start: 2020-03-10 | End: 2020-03-10

## 2020-03-10 NOTE — ED PROVIDER NOTES
Patient Seen in: [facility] AND [facility] Emergency Department      History   Patient presents with:  Abdomen/Flank Pain    Stated Complaint: n/v/d abd pain    HPI    50-year-old female presents the ER with complaint of nausea, vomiting since yesterday and diar Smokeless tobacco: Former User        Quit date: 9/7/1994      Tobacco comment: 1 pack every other day    Alcohol use: Not Currently      Alcohol/week: 0.0 standard drinks    Drug use:  No             Review of Systems    Positive for stated complaint: n/v/ (7) - Abnormal; Notable for the following components:       Result Value    AST 14 (*)     All other components within normal limits   BASIC METABOLIC PANEL (8) - Abnormal; Notable for the following components:    Glucose 127 (*)     Potassium 3.3 (*) 2 days  If symptoms worsen        Medications Prescribed:  Current Discharge Medication List    START taking these medications    ondansetron 4 MG Oral Tablet Dispersible  Take 1 tablet (4 mg total) by mouth every 4 (four) hours as needed for Nausea.   Qty:

## 2020-06-06 ENCOUNTER — HOSPITAL ENCOUNTER (INPATIENT)
Facility: HOSPITAL | Age: 40
LOS: 6 days | Discharge: HOME | DRG: 167 | End: 2020-06-13
Attending: STUDENT IN AN ORGANIZED HEALTH CARE EDUCATION/TRAINING PROGRAM | Admitting: HOSPITALIST
Payer: COMMERCIAL

## 2020-06-06 ENCOUNTER — APPOINTMENT (OUTPATIENT)
Dept: CT IMAGING | Facility: HOSPITAL | Age: 40
DRG: 167 | End: 2020-06-06
Attending: STUDENT IN AN ORGANIZED HEALTH CARE EDUCATION/TRAINING PROGRAM
Payer: COMMERCIAL

## 2020-06-06 DIAGNOSIS — R10.9 ABDOMINAL PAIN, ACUTE: ICD-10-CM

## 2020-06-06 DIAGNOSIS — R11.2 NAUSEA VOMITING AND DIARRHEA: ICD-10-CM

## 2020-06-06 DIAGNOSIS — R06.00 DYSPNEA, UNSPECIFIED TYPE: Primary | ICD-10-CM

## 2020-06-06 DIAGNOSIS — Z20.822 SUSPECTED COVID-19 VIRUS INFECTION: ICD-10-CM

## 2020-06-06 DIAGNOSIS — R50.9 FEVER, UNSPECIFIED FEVER CAUSE: ICD-10-CM

## 2020-06-06 DIAGNOSIS — R19.7 NAUSEA VOMITING AND DIARRHEA: ICD-10-CM

## 2020-06-06 DIAGNOSIS — R91.8 GROUND GLASS OPACITY PRESENT ON IMAGING OF LUNG: ICD-10-CM

## 2020-06-06 RX ORDER — FAMOTIDINE 10 MG/ML
20 INJECTION, SOLUTION INTRAVENOUS ONCE
Status: COMPLETED | OUTPATIENT
Start: 2020-06-06 | End: 2020-06-06

## 2020-06-06 RX ORDER — ONDANSETRON 2 MG/ML
4 INJECTION INTRAMUSCULAR; INTRAVENOUS ONCE
Status: COMPLETED | OUTPATIENT
Start: 2020-06-06 | End: 2020-06-06

## 2020-06-06 RX ORDER — LITHIUM CARBONATE 300 MG/1
300 CAPSULE ORAL 2 TIMES DAILY WITH MEALS
COMMUNITY

## 2020-06-06 RX ORDER — KETOROLAC TROMETHAMINE 30 MG/ML
15 INJECTION, SOLUTION INTRAMUSCULAR; INTRAVENOUS ONCE
Status: COMPLETED | OUTPATIENT
Start: 2020-06-06 | End: 2020-06-06

## 2020-06-06 RX ORDER — HYDROMORPHONE HYDROCHLORIDE 1 MG/ML
0.5 INJECTION, SOLUTION INTRAMUSCULAR; INTRAVENOUS; SUBCUTANEOUS EVERY 30 MIN PRN
Status: COMPLETED | OUTPATIENT
Start: 2020-06-06 | End: 2020-06-08

## 2020-06-07 ENCOUNTER — APPOINTMENT (OUTPATIENT)
Dept: CT IMAGING | Facility: HOSPITAL | Age: 40
DRG: 167 | End: 2020-06-07
Attending: STUDENT IN AN ORGANIZED HEALTH CARE EDUCATION/TRAINING PROGRAM
Payer: COMMERCIAL

## 2020-06-07 ENCOUNTER — APPOINTMENT (OUTPATIENT)
Dept: GENERAL RADIOLOGY | Facility: HOSPITAL | Age: 40
DRG: 167 | End: 2020-06-07
Attending: HOSPITALIST
Payer: COMMERCIAL

## 2020-06-07 PROBLEM — R11.2 NAUSEA VOMITING AND DIARRHEA: Status: ACTIVE | Noted: 2020-06-07

## 2020-06-07 PROBLEM — R10.9 ABDOMINAL PAIN, ACUTE: Status: ACTIVE | Noted: 2020-06-07

## 2020-06-07 PROBLEM — R11.2 NAUSEA & VOMITING: Status: ACTIVE | Noted: 2020-06-07

## 2020-06-07 PROBLEM — Z20.822 SUSPECTED COVID-19 VIRUS INFECTION: Status: ACTIVE | Noted: 2020-06-07

## 2020-06-07 PROBLEM — R50.9 FEVER, UNSPECIFIED FEVER CAUSE: Status: ACTIVE | Noted: 2020-06-07

## 2020-06-07 PROBLEM — R06.00 DYSPNEA, UNSPECIFIED TYPE: Status: ACTIVE | Noted: 2020-06-07

## 2020-06-07 PROBLEM — R19.7 NAUSEA VOMITING AND DIARRHEA: Status: ACTIVE | Noted: 2020-06-07

## 2020-06-07 PROBLEM — R91.8 GROUND GLASS OPACITY PRESENT ON IMAGING OF LUNG: Status: ACTIVE | Noted: 2020-06-07

## 2020-06-07 PROBLEM — R10.9 ABDOMINAL PAIN: Status: ACTIVE | Noted: 2020-06-07

## 2020-06-07 PROCEDURE — 99223 1ST HOSP IP/OBS HIGH 75: CPT | Performed by: INTERNAL MEDICINE

## 2020-06-07 PROCEDURE — 71045 X-RAY EXAM CHEST 1 VIEW: CPT | Performed by: HOSPITALIST

## 2020-06-07 PROCEDURE — 74177 CT ABD & PELVIS W/CONTRAST: CPT | Performed by: STUDENT IN AN ORGANIZED HEALTH CARE EDUCATION/TRAINING PROGRAM

## 2020-06-07 RX ORDER — POTASSIUM CHLORIDE 20 MEQ/1
40 TABLET, EXTENDED RELEASE ORAL EVERY 4 HOURS
Status: COMPLETED | OUTPATIENT
Start: 2020-06-07 | End: 2020-06-07

## 2020-06-07 RX ORDER — LITHIUM CARBONATE 300 MG/1
300 CAPSULE ORAL 2 TIMES DAILY WITH MEALS
Status: DISCONTINUED | OUTPATIENT
Start: 2020-06-07 | End: 2020-06-13

## 2020-06-07 RX ORDER — MORPHINE SULFATE 4 MG/ML
1 INJECTION, SOLUTION INTRAMUSCULAR; INTRAVENOUS EVERY 2 HOUR PRN
Status: DISCONTINUED | OUTPATIENT
Start: 2020-06-07 | End: 2020-06-08

## 2020-06-07 RX ORDER — MORPHINE SULFATE 4 MG/ML
2 INJECTION, SOLUTION INTRAMUSCULAR; INTRAVENOUS EVERY 2 HOUR PRN
Status: DISCONTINUED | OUTPATIENT
Start: 2020-06-07 | End: 2020-06-08

## 2020-06-07 RX ORDER — ONDANSETRON 2 MG/ML
4 INJECTION INTRAMUSCULAR; INTRAVENOUS EVERY 4 HOURS PRN
Status: CANCELLED | OUTPATIENT
Start: 2020-06-07

## 2020-06-07 RX ORDER — SODIUM CHLORIDE 9 MG/ML
INJECTION, SOLUTION INTRAVENOUS CONTINUOUS
Status: CANCELLED | OUTPATIENT
Start: 2020-06-07 | End: 2020-06-07

## 2020-06-07 RX ORDER — METOCLOPRAMIDE HYDROCHLORIDE 5 MG/ML
10 INJECTION INTRAMUSCULAR; INTRAVENOUS EVERY 8 HOURS PRN
Status: DISCONTINUED | OUTPATIENT
Start: 2020-06-07 | End: 2020-06-13

## 2020-06-07 RX ORDER — ONDANSETRON 2 MG/ML
4 INJECTION INTRAMUSCULAR; INTRAVENOUS EVERY 6 HOURS PRN
Status: DISCONTINUED | OUTPATIENT
Start: 2020-06-07 | End: 2020-06-13

## 2020-06-07 RX ORDER — ENOXAPARIN SODIUM 100 MG/ML
40 INJECTION SUBCUTANEOUS DAILY
Status: DISCONTINUED | OUTPATIENT
Start: 2020-06-07 | End: 2020-06-10

## 2020-06-07 RX ORDER — DEXTROSE AND SODIUM CHLORIDE 5; .45 G/100ML; G/100ML
INJECTION, SOLUTION INTRAVENOUS CONTINUOUS
Status: DISCONTINUED | OUTPATIENT
Start: 2020-06-07 | End: 2020-06-13

## 2020-06-07 RX ORDER — MORPHINE SULFATE 4 MG/ML
4 INJECTION, SOLUTION INTRAMUSCULAR; INTRAVENOUS EVERY 2 HOUR PRN
Status: DISCONTINUED | OUTPATIENT
Start: 2020-06-07 | End: 2020-06-08

## 2020-06-07 RX ORDER — HYDROMORPHONE HYDROCHLORIDE 1 MG/ML
0.5 INJECTION, SOLUTION INTRAMUSCULAR; INTRAVENOUS; SUBCUTANEOUS EVERY 30 MIN PRN
Status: CANCELLED | OUTPATIENT
Start: 2020-06-07 | End: 2020-06-07

## 2020-06-07 NOTE — ED INITIAL ASSESSMENT (HPI)
Pt c/o burning epigastric pain for past 6-weeks, pain has gotten worse over past 2-weeks, PT also c/o nausea, vomiting and diarrhea; body aches and fever yesterday

## 2020-06-07 NOTE — CM/SW NOTE
CM contacted to provide emotional support for patient. Patient is very emotional about being admitted to the hospital. She states she has not been away from her children since the passing of her  last November. Her children are 12 and 16.  Patient st

## 2020-06-07 NOTE — PROGRESS NOTES
06/07/20 9066   Clinical Encounter Type   Visited With Patient  ( responded to consult. Nurse approached patient about talking on phone with .   Patient declined offer.)   Continue Visiting No  ( remains available at pager #[phone]

## 2020-06-07 NOTE — PROGRESS NOTES
AxO x4, anxious and tearful at times. Room air while awake, 1L for comfort with abdominal pain or when asleep after pain meds. No cough, lungs clear. On tele, NSR. Potassium replaced per protocol. C/o abdominal pain, nausea, and loss of appetite.  PRN antie

## 2020-06-07 NOTE — ED NOTES
Per edmd verbal, pt ambulated in place with pulse oximeter. Pt states while marching in place, epigastric abdominal pain is worsened and pt experiences dyspnea.  o2 sat decreased to 91% on room air while marching in place, edmd aware

## 2020-06-07 NOTE — ED PROVIDER NOTES
Patient Seen in: [facility] Emergency Department      History   Patient presents with:  Abdomen/Flank Pain    Stated Complaint: Abdl Pain, Chills, Bodyaches    HPI    Patient is a 44-year-old female presenting to emergency department reporting chill MD at [address]  2014    cervical spine discectomy                    Social History    Tobacco Use      Smoking status: Former Smoker        Packs/day: 0.50        Years: 10.00        Pack years: 5 affect.      ED Course     Labs Reviewed   COMP METABOLIC PANEL (14) - Abnormal; Notable for the following components:       Result Value    Glucose 132 (*)     Sodium 134 (*)     Potassium 3.3 (*)     CO2 18.0 (*)     Albumin 3.2 (*)     Globulin  5.0 (*) moderate hiatal hernia with slight wall thickening in the distal esophagus was also noted likely related to mild esophagitis. No acute intra-abdominal inflammatory process was identified.            MDM     Extensive differential diagnosis was considered f

## 2020-06-07 NOTE — PLAN OF CARE
Problem: Patient/Family Goals  Goal: Patient/Family Long Term Goal  Description  Patient's Long Term Goal: Discharge with adequate resources    Interventions:  - Participate and comply with medical treatment plan  - See additional Care Plan goals for spe applicable  - Encourage broncho-pulmonary hygiene including cough, deep breathe, Incentive Spirometry  - Assess the need for suctioning and perform as needed  - Assess and instruct to report SOB or any respiratory difficulty  - Respiratory Therapy support appropriate  - Monitor I&O, WT and lab values  - Obtain nutritional consult as needed  - Evaluate psychosocial factors contributing to over-consumption  Outcome: Progressing     Problem: METABOLIC/FLUID AND ELECTROLYTES - ADULT  Goal: Glucose maintained wi with PRN dilaudid. Pt updated on plan of care, verbalized understanding. Call light within reach, will continue to monitor.

## 2020-06-07 NOTE — PROGRESS NOTES
[facility]  Progress Note     Aubrey Moise Patient Status:  Inpatient    1980 MRN [id number]   [facility] 3SW-A Attending Ruth Barbosa, [address] [facility] Day # 0 PCP Surjit Calloway MD     Chief Complaint: Abdominal pain    S: Patient TROP <0.045 <0.045 <0.045   CK 60  --   --             Imaging: Imaging data reviewed in Epic.     Medications:   • enoxaparin  40 mg Subcutaneous Daily   • Lithium Carbonate  300 mg Oral BID with meals   • Potassium Chloride ER  40 mEq Oral Q4H   • panto

## 2020-06-08 ENCOUNTER — APPOINTMENT (OUTPATIENT)
Dept: CT IMAGING | Facility: HOSPITAL | Age: 40
DRG: 167 | End: 2020-06-08
Attending: INTERNAL MEDICINE
Payer: COMMERCIAL

## 2020-06-08 PROCEDURE — 74174 CTA ABD&PLVS W/CONTRAST: CPT | Performed by: HOSPITALIST

## 2020-06-08 PROCEDURE — 71275 CT ANGIOGRAPHY CHEST: CPT | Performed by: INTERNAL MEDICINE

## 2020-06-08 PROCEDURE — 99232 SBSQ HOSP IP/OBS MODERATE 35: CPT | Performed by: HOSPITALIST

## 2020-06-08 RX ORDER — PROCHLORPERAZINE EDISYLATE 5 MG/ML
10 INJECTION INTRAMUSCULAR; INTRAVENOUS EVERY 6 HOURS PRN
Status: DISCONTINUED | OUTPATIENT
Start: 2020-06-08 | End: 2020-06-13

## 2020-06-08 RX ORDER — HYDROMORPHONE HYDROCHLORIDE 1 MG/ML
0.2 INJECTION, SOLUTION INTRAMUSCULAR; INTRAVENOUS; SUBCUTANEOUS EVERY 2 HOUR PRN
Status: DISCONTINUED | OUTPATIENT
Start: 2020-06-08 | End: 2020-06-13

## 2020-06-08 RX ORDER — HYDROMORPHONE HYDROCHLORIDE 1 MG/ML
0.4 INJECTION, SOLUTION INTRAMUSCULAR; INTRAVENOUS; SUBCUTANEOUS EVERY 2 HOUR PRN
Status: DISCONTINUED | OUTPATIENT
Start: 2020-06-08 | End: 2020-06-13

## 2020-06-08 RX ORDER — ACETAMINOPHEN 325 MG/1
650 TABLET ORAL EVERY 6 HOURS PRN
Status: DISCONTINUED | OUTPATIENT
Start: 2020-06-08 | End: 2020-06-13

## 2020-06-08 RX ORDER — POTASSIUM CHLORIDE 20 MEQ/1
40 TABLET, EXTENDED RELEASE ORAL EVERY 4 HOURS
Status: COMPLETED | OUTPATIENT
Start: 2020-06-08 | End: 2020-06-09

## 2020-06-08 RX ORDER — HYDROMORPHONE HYDROCHLORIDE 1 MG/ML
0.8 INJECTION, SOLUTION INTRAMUSCULAR; INTRAVENOUS; SUBCUTANEOUS EVERY 2 HOUR PRN
Status: DISCONTINUED | OUTPATIENT
Start: 2020-06-08 | End: 2020-06-13

## 2020-06-08 NOTE — PLAN OF CARE
Problem: R/O COVID, Epigastric pain  Data: pt is from home. A&Ox4. Has epigastric pain c/o N/V no diarrhea since admission though. A bit anxious. O2 sats WNL on 1L for sleep. Room air is baseline. On tele NSR/ST. Tolerating diet.  Voids to the bathroom by s fever/infection during anticipated neutropenic period  Description  INTERVENTIONS  - Monitor WBC  - Administer growth factors as ordered  - Implement neutropenic guidelines  Outcome: Progressing     Problem: RESPIRATORY - ADULT  Goal: Achieves optimal vent (undernourished)  Description  INTERVENTIONS:  - Monitor percentage of each meal consumed  - Identify factors contributing to decreased intake, treat as appropriate  - Assist with meals as needed  - Monitor I&O, WT and lab values  - Obtain nutritional cons gravity, serum osmolarity and serum sodium as indicated or ordered  - Monitor response to interventions for patient's volume status, including labs, urine output, blood pressure (other measures as available)  - Encourage oral intake as appropriate  - Instr

## 2020-06-08 NOTE — RESPIRATORY THERAPY NOTE
Attempted to do Covid-PCR swab around 1805. Patient refusing stating she didn't want to do any more testing that will require her to stay in hospital any longer. RN and MD aware.

## 2020-06-08 NOTE — PAYOR COMM NOTE
--------------  ADMISSION REVIEW     Payor: MERARY TRENT  Subscriber #:  [id number]  Authorization Number:  [id number]     Admit date: 6/7/20  Admit time: 0425     Patient Seen in: [facility] Emergency Department    History   Patient presents with:  Abd no edema. Neurological: alert and oriented to person, place, and time. Skin: Skin is warm and dry. No rash noted. Psychiatric: normal mood and affect.      Labs Reviewed   COMP METABOLIC PANEL (14) - Abnormal; Notable for the following components: further evaluation and treatment. Case discussed in detail with the admitting physician, who agreed to the emergency part management disposition of the patient.     Admission disposition: 6/7/2020  2:10 AM    Disposition and Plan     Clinical Impression: gallops. Equal pulses. Chest and Back: No tenderness or deformity. Abdomen: Soft, nontender, nondistended. Positive bowel sounds. No rebound, guarding or organomegaly. Neurologic: No focal neurological deficits. CNII-XII grossly intact.   Musculoskelet .0 334.0 311.0      Lab 06/06/20  2300 06/07/20  0454 06/07/20 2038   * 105*  --    BUN 9 10  --    CREATSERUM 0.74 0.76  --    GFRAA 117 113  --    GFRNAA 102 98  --    CA 8.9 8.3*  --    ALB 3.2*  --   --    * 136  --    K 3.3* 3.5 Intravenous Maximino Goldberg, RN    6/8/2020 4815 Given 4 mg Intravenous Maximino Goldberg, RN    6/8/2020 0142 Given 4 mg Intravenous Rayna Hedrick RN    6/7/2020 2226 Given 4 mg Intravenous Rayna Hedrick RN    6/7/2020 0013 Given 4 mg Intravenous Sarah [place]
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
## 2020-06-08 NOTE — DIETARY NOTE
[facility] [address]     Admitting diagnosis:  Abdominal pain, acute [R10.9]  Nausea vomiting and diarrhea [R11.2, R19.7]  Fever, unspecified fever cause [R50.9]  Ground glass opacity present on imaging of lung [R91.8]  D

## 2020-06-08 NOTE — PLAN OF CARE
Problem: Patient/Family Goals  Goal: Patient/Family Long Term Goal  Description  Patient's Long Term Goal: Discharge with adequate resources    Interventions:  - Participate and comply with medical treatment plan  - See additional Care Plan goals for spe Monitor for opioid side effects  - Notify MD/LIP if interventions unsuccessful or patient reports new pain  - Anticipate increased pain with activity and pre-medicate as appropriate  Outcome: Progressing     Patient alert and oriented. Anxious.  Tearful at

## 2020-06-08 NOTE — CONSULTS
[facility]                       Gastroenterology [address] [facility] Gastroenterology    Matt Arreguin Patient Status:  Inpatient    1980 MRN [id number]   [facility] 3SW-A Attending Johana Adorno, [address] [place] [address] OR   • LITHOTRIPSY  2008    nephrostomy tube removed   • OTHER      benign bilateral breast biopsy   • OTHER  2007    Nephrostomy tube insertion left kidney-removed 2008   • OTHER SURGICAL HISTORY  2007    nephroscopy with tube insertion   • OTHER SURGICAL Intravenous, Once  HYDROmorphone HCl (DILAUDID) 1 MG/ML injection 0.5 mg, 0.5 mg, Intravenous, Q30 Min PRN        Allergies:   Dairy Products          ANAPHYLAXIS  Gluten Flour            DIARRHEA, NAUSEA AND VOMITING    SocHx:  +history of smoking;   The p 06/08/2020    HGB 10.6 06/08/2020    HCT 33.6 06/08/2020    .0 06/08/2020    K 4.2 06/07/2020     Recent Labs   Lab 06/06/20  2300 06/07/20  0454 06/07/20 2038   * 105*  --    BUN 9 10  --    CREATSERUM 0.74 0.76  --    GFRAA 117 113  --

## 2020-06-08 NOTE — PROGRESS NOTES
[facility]  Progress Note     Sallie Burnett Patient Status:  Inpatient    1980 MRN [id number]   [facility] 3SW-A Attending Teresa Harper, [address] [facility] Day # 1 PCP Jeovanny Balbuena MD     Chief Complaint: Abdominal pain    S: Patient last 168 hours. Recent Labs   Lab 06/06/20  2300 06/07/20  0454 06/07/20  0828   TROP <0.045 <0.045 <0.045   CK 60  --   --             Imaging: Imaging data reviewed in Epic.     Medications:   • enoxaparin  40 mg Subcutaneous Daily   • Lithium Carbonat

## 2020-06-09 ENCOUNTER — APPOINTMENT (OUTPATIENT)
Dept: CV DIAGNOSTICS | Facility: HOSPITAL | Age: 40
DRG: 167 | End: 2020-06-09
Attending: INTERNAL MEDICINE
Payer: COMMERCIAL

## 2020-06-09 PROCEDURE — 93306 TTE W/DOPPLER COMPLETE: CPT | Performed by: INTERNAL MEDICINE

## 2020-06-09 PROCEDURE — 99232 SBSQ HOSP IP/OBS MODERATE 35: CPT | Performed by: HOSPITALIST

## 2020-06-09 RX ORDER — POTASSIUM CHLORIDE 20 MEQ/1
40 TABLET, EXTENDED RELEASE ORAL ONCE
Status: COMPLETED | OUTPATIENT
Start: 2020-06-09 | End: 2020-06-09

## 2020-06-09 NOTE — PLAN OF CARE
Problem: Patient/Family Goals  Goal: Patient/Family Long Term Goal  Description  Patient's Long Term Goal: Discharge with adequate resources    Interventions:  - Participate and comply with medical treatment plan  - See additional Care Plan goals for spe

## 2020-06-09 NOTE — PROGRESS NOTES
[facility] Gastroenterology Progress Note    I did not enter patients room today due to policy to minimize PPE use/exposure in setting of current COVID-19 epidemic. Interview was conducted over the phone and with aid of RN.        S: Pt still with epi

## 2020-06-09 NOTE — PROGRESS NOTES
[facility]                INFECTIOUS DISEASE PROGRESS NOTE    Mitulchloe Mondragoncurry Patient Status:  Inpatient    1980 MRN [id number]   [facility] 3SW-A Attending Johana Adorno, [address] [facility] Day # 2 PCP Viky Loomis MD     Antibiotics: 105*  --  112*  --    BUN 9 10  --  8  --    CREATSERUM 0.74 0.76  --  0.62  --    GFRAA 117 113  --  130  --    GFRNAA 102 98  --  113  --    CA 8.9 8.3*  --  9.3  --    ALB 3.2*  --   --  2.9*  --    * 136  --  133*  --    K 3.3* 3.5 4.2 3.6 3.8 negative  Rising inflammatory markers, D-dimer  Elevated WBC/and normal lymphocytes don't fit with covid    Awaiting repeat PCR  HIV ag/ab combo in am(patient informed/consented)  If negative consult pulmonary,  consider bronch vs empiric expectant managem

## 2020-06-09 NOTE — PROGRESS NOTES
[facility]  Progress Note     Alina Garcia Patient Status:  Inpatient    1980 MRN [id number]   [facility] 3SW-A Attending Rex Hurtado, [address] [facility] Day # 2 PCP Edgar Napier MD     Chief Complaint: Abdominal pain    S: Patient ALT 14  --   --  14  --    BILT 0.8  --   --  0.6  --    TP 8.2  --   --  8.7*  --        Estimated Creatinine Clearance: 95.4 mL/min (based on SCr of 0.62 mg/dL). No results for input(s): PTP, INR in the last 168 hours.     Recent Labs   Lab 06/06/20

## 2020-06-09 NOTE — PAYOR COMM NOTE
--------------  CONTINUED STAY REVIEW    Payor: MERARY TRENT  Subscriber #:  [id number]  Authorization Number:  [id number]   -CLINICALS UP TO 6/8/20 FAXED ON 6/8/20- FAXING CLINICAL UPDATE FOR 6/9/20    Admit date: 6/7/20  Admit time: 0425 6/9/20  Chief C    ASSESSMENT / PLAN:      1. Intractable abdominal pain/N/V/D  1. CT A/P non-revealing  2. BID IV PPI  3. Stool studies if diarrhea recurs   4. No evidence of ischemia on CTA A/P  5. GI following  2. Abnormal CXR/elevated inflammatory markers  1.  Rapid IV push     Date Action Dose Route     6/9/2020 0837 Given 40 mg Intravenous     6/8/2020 2011 Given 40 mg Intravenous       Potassium Chloride ER (K-DUR M20) CR tab 40 mEq     Date Action Dose Route     6/9/2020 0036 Given 40 mEq Oral     6/8/2020 2109 Gi

## 2020-06-10 PROCEDURE — 99232 SBSQ HOSP IP/OBS MODERATE 35: CPT | Performed by: HOSPITALIST

## 2020-06-10 RX ORDER — POTASSIUM CHLORIDE 20 MEQ/1
40 TABLET, EXTENDED RELEASE ORAL EVERY 4 HOURS
Status: COMPLETED | OUTPATIENT
Start: 2020-06-10 | End: 2020-06-10

## 2020-06-10 RX ORDER — ENOXAPARIN SODIUM 100 MG/ML
40 INJECTION SUBCUTANEOUS NIGHTLY
Status: DISCONTINUED | OUTPATIENT
Start: 2020-06-10 | End: 2020-06-13

## 2020-06-10 NOTE — CONSULTS
Javi Chaparro [address] [facility] [address] Note [facility]  Report of Consultation    Anthony Pond Patient Status:  Inpatient    1980 MRN [id number]   [facility] 5NW-A Attending Lyle Zee TUBERCLE OSTEOTOMY Left 11/13/2015    Performed by Eliud Almodovar MD at [facility] MAIN OR   • KNEE MANIPULATION Left 3/25/2016    Performed by Eliud Almodovar MD at [facility] MAIN OR   • LITHOTRIPSY  2008    nephrostomy tube removed   • OTHER      benign bilateral elissa heart beats, syncope, fatigue, orthopnea, paroxysmal nocturnal dyspnea, lower extremity edema. Gastrointestinal: note HPI  Integument/breast: Negative for rash, skin lesions, and pruritus.   Hematologic/lymphatic: Negative for easy bruising, bleeding, and lymphadenopathy in neck, axillae, groin  MSK: Normal strength and sensation in all extremities  EXT: No overt deformities, No C/C/E, 2+ DP/PT pulses b/l   SKIN: No rashes, ulcers, nodules    Lab Data Review:  Recent Labs   Lab 06/07/20  0454  06/08/20  183 Negative   LEUUR Negative   WBCUR 5-10*   RBCUR >10*   BACUR None Seen   EPIUR Moderate*       Radiology:   6/8 CT chest reviewed personally which shows centrolobular GGO bilaterally, more so in BUL than BLL.  Also with right hilar LAD      ASSESSMENT    ·

## 2020-06-10 NOTE — PLAN OF CARE
Problem: Patient/Family Goals  Goal: Patient/Family Long Term Goal  Description  Patient's Long Term Goal: Discharge with adequate resources    Interventions:  - Participate and comply with medical treatment plan  - See additional Care Plan goals for spe Assess for changes in respiratory status  - Assess for changes in mentation and behavior  - Position to facilitate oxygenation and minimize respiratory effort  - Oxygen supplementation based on oxygen saturation or ABGs  - Provide Smoking Cessation handout allow for food preferences  - Enhance eating environment  Outcome: Progressing  Goal: Achieves appropriate nutritional intake (bariatric)  Description  INTERVENTIONS:  - Monitor for over-consumption  - Identify factors contributing to increased intake, ren Progressing         Pt aox4, anxious. Pt tearful this AM. Maintaining O2 sats on 3L. Tele-NSR/ST. Voids, up self. Pt c/o pain & nausea- prn medications per MAR. Plan for bronch in AM. NPO at 0500. IVABX. IVF infusing. Tolerating full liquid diet.   Pt upda

## 2020-06-10 NOTE — PROGRESS NOTES
[facility]                INFECTIOUS DISEASE PROGRESS NOTE    Aubrey Moise Patient Status:  Inpatient    1980 MRN [id number]   [facility] 3SW-A Attending Ruth Barbosa, [address] [facility] Day # 3 PCP Surjit Calloway MD     Antibiotics: 06/10/20  0524   * 105*  --  112*  --   --    BUN 9 10  --  8  --   --    CREATSERUM 0.74 0.76  --  0.62  --   --    GFRAA 117 113  --  130  --   --    GFRNAA 102 98  --  113  --   --    CA 8.9 8.3*  --  9.3  --   --    ALB 3.2*  --   --  2.9*  -- infiltrates/hypoxia  Suspicious for COVID-19 pneumonia vs other etiologies  --was tested negative on 6/6, 6/7, 6/9 by PCR, and negative IGG 6/8  Rising inflammatory markers 6/9 , D-dimer  Fever improved  Pulmonary consult, consider bronchoscopy    2.  Ghazala Jenkins

## 2020-06-10 NOTE — PROGRESS NOTES
Received pt at 2300. A/O x4. Vital signs stable. Tolerating 3L, will wean as able. Tele-NSR/ST with pain. Voiding freely. Complaints of epigastric pain. PRN analgesics given. Up self. IV abx. IVF. Safety precautions in place. Pt updated on plan of care.

## 2020-06-10 NOTE — PAYOR COMM NOTE
--------------  CONTINUED STAY REVIEW    Payor: MERARY TRENT  Subscriber #:  [id number]  Authorization Number:  [id number]         6/9 GASTRO    Assessment/Plan:      - pt's sx are concerning for possible COVID 19 infection  given her GGO and constellation o 06/07/20  0454 06/07/20 2038 06/08/20  1837 06/09/20  0413   * 105*  --  112*  --    BUN 9 10  --  8  --    CREATSERUM 0.74 0.76  --  0.62  --    GFRAA 117 113  --  130  --    GFRNAA 102 98  --  113  --    CA 8.9 8.3*  --  9.3  --    ALB 3.2*  -- 0.8 mg Intravenous Caryle Shear, RN    6/9/2020 1411 Given 0.8 mg Intravenous Yudi Wheat RN      Lithium Carbonate cap 300 mg     Date Action Dose Route User    6/9/2020 1839 Given 300 mg Oral Yudi Wheat RN    6/9/2020 1098 Given 300 mg O

## 2020-06-10 NOTE — PROGRESS NOTES
[facility] Gastroenterology Progress Note    I did not enter patients room today due to policy to minimize PPE use/exposure in setting of current COVID-19 epidemic. Interview was conducted over the phone and with aid of RN.        S: Pt denies abd valeria off at this time. Please call back with any questions or concerns.        Lord Hernandez, 06/10/20, 1:56 PM  [facility] Gastroenterology

## 2020-06-10 NOTE — PLAN OF CARE
Problem: R/O COVID, Epigastric pain  Data: pt is from home. A&Ox4. Has epigastric pain. A bit anxious. O2 sats WNL 3L. Room air is baseline. On tele NSR/ST. Voids to the bathroom by self. C/o pain in epigastric area PRN pain med and antiemetic for nausea. pain  - Anticipate increased pain with activity and pre-medicate as appropriate  Outcome: Progressing     Problem: RISK FOR INFECTION - ADULT  Goal: Absence of fever/infection during anticipated neutropenic period  Description  INTERVENTIONS  - Monitor WBC collaborating with pharmacy to review patient's medication profile  Outcome: Progressing  Goal: Maintains adequate nutritional intake (undernourished)  Description  INTERVENTIONS:  - Monitor percentage of each meal consumed  - Identify factors contributing and assess for signs and symptoms of volume excess or deficit  - Monitor intake, output and patient weight  - Monitor urine specific gravity, serum osmolarity and serum sodium as indicated or ordered  - Monitor response to interventions for patient's volum

## 2020-06-10 NOTE — PROGRESS NOTES
[facility]  Progress Note     Shannan Hogan Patient Status:  Inpatient    1980 MRN [id number]   [facility] 3SW-A Attending Lj Morgan MD   Hosp Day # 3 PCP Timothy Mullins MD     Chief Complaint: Abdominal pain    S: No acu 27   ALT 14  --   --  14  --  29   BILT 0.8  --   --  0.6  --  0.4   TP 8.2  --   --  8.7*  --  8.0    < > = values in this interval not displayed. Estimated Creatinine Clearance: 87 mL/min (based on SCr of 0.68 mg/dL).     No results for input(s): PT low  6. Obesity  1. BMI 35  7.  Hypokalemia, replace    Plan of care: as above, pulm eval:  Broncho tomorrow    Quality:  · DVT Prophylaxis: lovenox  · CODE status: Full  · Michel: no  · Central line: no    Will the patient be referred to [facility] on discharge?:

## 2020-06-11 ENCOUNTER — APPOINTMENT (OUTPATIENT)
Dept: GENERAL RADIOLOGY | Facility: HOSPITAL | Age: 40
DRG: 167 | End: 2020-06-11
Attending: INTERNAL MEDICINE
Payer: COMMERCIAL

## 2020-06-11 PROCEDURE — 71045 X-RAY EXAM CHEST 1 VIEW: CPT | Performed by: INTERNAL MEDICINE

## 2020-06-11 PROCEDURE — 0BBC8ZX EXCISION OF RIGHT UPPER LUNG LOBE, VIA NATURAL OR ARTIFICIAL OPENING ENDOSCOPIC, DIAGNOSTIC: ICD-10-PCS | Performed by: INTERNAL MEDICINE

## 2020-06-11 PROCEDURE — 0B9C8ZX DRAINAGE OF RIGHT UPPER LUNG LOBE, VIA NATURAL OR ARTIFICIAL OPENING ENDOSCOPIC, DIAGNOSTIC: ICD-10-PCS | Performed by: INTERNAL MEDICINE

## 2020-06-11 PROCEDURE — 99232 SBSQ HOSP IP/OBS MODERATE 35: CPT | Performed by: HOSPITALIST

## 2020-06-11 RX ORDER — MIDAZOLAM HYDROCHLORIDE 1 MG/ML
INJECTION INTRAMUSCULAR; INTRAVENOUS
Status: DISCONTINUED | OUTPATIENT
Start: 2020-06-11 | End: 2020-06-11 | Stop reason: HOSPADM

## 2020-06-11 RX ORDER — ONDANSETRON 2 MG/ML
4 INJECTION INTRAMUSCULAR; INTRAVENOUS ONCE AS NEEDED
Status: DISCONTINUED | OUTPATIENT
Start: 2020-06-11 | End: 2020-06-11 | Stop reason: HOSPADM

## 2020-06-11 NOTE — OPERATIVE REPORT
John Arias Patient Status:  Hospital Outpatient Surgery    1936 MRN [id number]   [facility] ENDOSCOPY Attending Ada Velazquez MD   Hosp Day # 4 PCP Clyde Bender MD     OPERATIVE REPORT:     DATE OF OPERATION cleared with suction aspiration via the bronchoscope revealing normal endobronchial examination bilaterally to the subsegmental level.    A bronchoalveolar lavage was then performed in the right upper lobe anterior segment with the instillation of 100 mL st

## 2020-06-11 NOTE — PAYOR COMM NOTE
--------------  CONTINUED STAY REVIEW    Payor: MERARY TRENT  Subscriber #:  [id number]  Authorization Number:  [id number]           6/10 INF DIS    ASSESSMENT/PLAN:  1. Pulmonary ground glass infiltrates/hypoxia  Suspicious for COVID-19 pneumonia vs other e B/L infiltrates   4. PCT normal   5. FLORINDA/ANCA panel pending, HIV pending  3. Elevated D-Dimer  1. CTA chest without PE  4. Hyponatremia  1. Mild  2. Continue gentle IVF while PO intake poor  5. Bipolar disorder  1. Lithium   2. Level low  6. Obesity  1.  BM diarrhea recurs, would obtain GI pcr panel and C. Diff   - BCx ngtd  - will arrange for follow up in 3-4 weeks to discuss sx and llikely OP EGD vs XR esophagogram       GI will sign off at this time.  Please call back with any questions or concerns.     M20) CR tab 40 mEq     Date Action Dose Route User    6/10/2020 1210 Given 40 mEq Oral Alray Francia, RN    6/10/2020 1931 Given 40 mEq Oral Alray Francia, RN      Prochlorperazine Edisylate (COMPAZINE) injection 10 mg     Date Action Dose Route User    6/11

## 2020-06-11 NOTE — PROGRESS NOTES
[facility]  Progress Note     Lj Myers Patient Status:  Inpatient    1980 MRN [id number]   [facility] 3SW-A Attending Chago Johnson MD   Hosp Day # 4 PCP Judene Kayser, MD     Chief Complaint: Abdominal pain    S: No acu 108  --  104  --  106  --    CO2 18.0* 21.0  --  16.0*  --  19.0*  --    ALKPHO 77  --   --  89  --  90  --    AST 16  --   --  20  --  27  --    ALT 14  --   --  14  --  29  --    BILT 0.8  --   --  0.6  --  0.4  --    TP 8.2  --   --  8.7*  --  8.0  -- chest without PE  4. Hyponatremia  1. Mild  2. Continue gentle IVF while PO intake poor  5. Bipolar disorder  1. Lithium   2. Level low  6. Obesity  1. BMI 35  7.  Hypokalemia, resolved    Plan of care: bronchoscopy today    Quality:  · DVT Prophylaxis: lov

## 2020-06-11 NOTE — PLAN OF CARE
PROBLEM: Epigastric pain    ASSESSMENT: Pt is A&Ox4, anxious at times. VSS, afebrile. Maintaining O2 sats >94% on RA, occasionally wears 2L while sleeping. Tele, NSR/ST at times. Lovenox.  Voids, tolerating full liquids, NPO at 0500 tomorrow for bronch in A Manage/alleviate anxiety  - Utilize distraction and/or relaxation techniques  - Monitor for opioid side effects  - Notify MD/LIP if interventions unsuccessful or patient reports new pain  - Anticipate increased pain with activity and pre-medicate as approp tolerated  - Evaluate effectiveness of GI medications  - Encourage mobilization and activity  - Obtain nutritional consult as needed  - Establish a toileting routine/schedule  - Consider collaborating with pharmacy to review patient's medication profile  O on fluid and nutrition restrictions as appropriate  Outcome: Progressing  Goal: Hemodynamic stability and optimal renal function maintained  Description  INTERVENTIONS:  - Monitor labs and assess for signs and symptoms of volume excess or deficit  - Monito

## 2020-06-11 NOTE — PLAN OF CARE
Pt is aox4, VSS, afebrile. 2L O2, baseline RA. Tele NSR/ ST. Lovenox. Up independently. C/o epigastric pain, see MAR. C/o nausea, see MAR. IV abx. NPO advance as tolerated. Pt is going for a bronch at 0801-0993.  Resting comfortably in bed with call light i

## 2020-06-11 NOTE — PROGRESS NOTES
[facility]                INFECTIOUS DISEASE PROGRESS NOTE    Sallie Hortencia Patient Status:  Inpatient    1980 MRN [id number]   [facility] 3SW-A Attending Teresa Harper, [address] [facility] Day # 4 PCP Jeovanny Balbuena MD     Antibiotics: 06/07/20  0454  06/08/20  1837 06/09/20  0413 06/10/20  0524 06/10/20  1627   * 105*  --  112*  --  119*  --    BUN 9 10  --  8  --  5*  --    CREATSERUM 0.74 0.76  --  0.62  --  0.68  --    GFRAA 117 113  --  130  --  127  --    GFRNAA 102 98  -- opacity present on imaging of lung     Abdominal pain     Nausea & vomiting          ASSESSMENT/PLAN:  1. Pulmonary ground glass infiltrates/hypoxia  Suspicious for COVID-19 pneumonia vs other etiologies  --was tested negative on 6/6, 6/7, 6/9 by PCR, and

## 2020-06-11 NOTE — PROGRESS NOTES
[facility] Pulmonary/Critical Care Progress Note     SUBJECTIVE/Interval history: All events, procedures, notes reviewed.  No acute events overnight, she feels slightly better today, still with dyspnea with movements and 06/10/20  0524 06/10/20  1627   *  --  112*  --  119*  --    BUN 10  --  8  --  5*  --    CREATSERUM 0.76  --  0.62  --  0.68  --    GFRAA 113  --  130  --  127  --    GFRNAA 98  --  113  --  110  --    CA 8.3*  --  9.3  --  8.8  --      --  1 Intravenous BID   • cefTRIAXone  2 g Intravenous Q24H   • Lithium Carbonate  300 mg Oral BID with meals     acetaminophen, Prochlorperazine Edisylate, HYDROmorphone HCl **OR** HYDROmorphone HCl **OR** HYDROmorphone HCl, ondansetron HCl, Metoclopramide HCl

## 2020-06-12 PROCEDURE — B547ZZA ULTRASONOGRAPHY OF LEFT SUBCLAVIAN VEIN, GUIDANCE: ICD-10-PCS | Performed by: HOSPITALIST

## 2020-06-12 PROCEDURE — 99232 SBSQ HOSP IP/OBS MODERATE 35: CPT | Performed by: HOSPITALIST

## 2020-06-12 PROCEDURE — 05H633Z INSERTION OF INFUSION DEVICE INTO LEFT SUBCLAVIAN VEIN, PERCUTANEOUS APPROACH: ICD-10-PCS | Performed by: HOSPITALIST

## 2020-06-12 RX ORDER — GUAIFENESIN 600 MG
600 TABLET, EXTENDED RELEASE 12 HR ORAL 2 TIMES DAILY
Status: DISCONTINUED | OUTPATIENT
Start: 2020-06-12 | End: 2020-06-13

## 2020-06-12 RX ORDER — BENZONATATE 100 MG/1
100 CAPSULE ORAL 3 TIMES DAILY
Status: DISCONTINUED | OUTPATIENT
Start: 2020-06-12 | End: 2020-06-13

## 2020-06-12 NOTE — PLAN OF CARE
Problem: Patient/Family Goals  Goal: Patient/Family Long Term Goal  Description  Patient's Long Term Goal: Discharge with adequate resources    Interventions:  - Participate and comply with medical treatment plan  - See additional Care Plan goals for spe guidelines  Outcome: Progressing     Problem: RESPIRATORY - ADULT  Goal: Achieves optimal ventilation and oxygenation  Description  INTERVENTIONS:  - Assess for changes in respiratory status  - Assess for changes in mentation and behavior  - Position to fa needed  - Monitor I&O, WT and lab values  - Obtain nutritional consult as needed  - Optimize oral hygiene and moisture  - Encourage food from home; allow for food preferences  - Enhance eating environment  Outcome: Progressing  Goal: Achieves appropriate n

## 2020-06-12 NOTE — PROGRESS NOTES
[facility]  Progress Note     Jorge Cornelius Patient Status:  Inpatient    1980 MRN [id number]   [facility] 3SW-A Attending Estela Jimenes MD   Hosp Day # 5 PCP Clyde Bender MD     Chief Complaint: Abdominal pain    S: No acu --  16.0*  --  19.0*  --    ALKPHO 77  --   --  89  --  90  --    AST 16  --   --  20  --  27  --    ALT 14  --   --  14  --  29  --    BILT 0.8  --   --  0.6  --  0.4  --    TP 8.2  --   --  8.7*  --  8.0  --     < > = values in this interval not display PE  4. Hyponatremia  1. Mild  2. Continue gentle IVF while PO intake poor  5. Bipolar disorder  1. Lithium   2. Level low  6. Obesity  1. BMI 35  7.  Hypokalemia, resolved    Plan of care: await bronch results, d/c planning in next day or two    Quality:  ·

## 2020-06-12 NOTE — PAYOR COMM NOTE
--------------  6/12 CONTINUED STAY REVIEW    Payor: Ignacio Etienne [employer] PPO  Subscriber #:  [id number]  Authorization Number:  [id number]      [facility] Pulmonary/Critical Care Progress Note      SUBJECTIVE/Interval history:   All events, procedures, not midline, no adenopathy; no thyromegaly   Lungs:CTAB no wheeze  Chest wall:No tenderness or deformity. Heart:RRR no m  Abdomen:Soft, non-tender. No masses. No guarding. No rebound. Extremities:no edema   Skin:No rashes or lesions.   Lymph nodes:Not exam abx  · CRP/ESR elevated,  FLORINDA neg, ANCA pending  · Await bronch/BAL and biopsy results; hopefully some results today  · Mobilize/OOBTC as tolerated  · Wean o2 for sats >89%     Reviewed with Dr. Milly Oneill and Dr. Marti Hughse MD  [facility] CREATSERUM 0.74 0.76  --  0.62  --  0.68  --    GFRAA 117 113  --  130  --  127  --    GFRNAA 102 98  --  113  --  110  --    CA 8.9 8.3*  --  9.3  --  8.8  --    ALB 3.2*  --   --  2.9*  --  2.4*  --    * 136  --  133*  --  134*  --    K 3.3* 3.5 hypoxia, suspicious for COVID PNA vs inflammatory process  1. Rapid COVID negative and PCR negative  1. Repeat COVID PCR negative  2. COVID IgG negative  3. S/p bronchoscopy, await results  2. Monitor inflammatory markers  3. CXR with B/L infiltrates   4. HYDROmorphone HCl (DILAUDID) 1 MG/ML injection 0.2 mg     Date Action Dose Route User    6/12/2020 1132 Given 0.2 mg Intravenous Aurora Hampton RN    6/12/2020 0840 Given 0.2 mg Intravenous Aurora Hampton RN    6/12/2020 0440 Given 0.2 mg Susan Pierson

## 2020-06-12 NOTE — PROGRESS NOTES
[facility] Pulmonary/Critical Care Progress Note     SUBJECTIVE/Interval history: All events, procedures, notes reviewed. No acute issues post bronchoscopy.  she feels better today, less dyspnea, but still with cough and masses. No guarding. No rebound. Extremities:no edema   Skin:No rashes or lesions.   Lymph nodes:Not examined          Lab Data Review:   Recent Labs   Lab 06/07/20  0454  06/08/20  1837 06/09/20  0413 06/10/20  0524 06/10/20  1627   *  --  112* LEUUR Negative   WBCUR 5-10*   RBCUR >10*   BACUR None Seen   EPIUR Moderate*       Interval Radiology:         • enoxaparin  40 mg Subcutaneous Nightly   • pantoprazole (PROTONIX) IV push  40 mg Intravenous BID   • cefTRIAXone  2 g Intravenous Q24H   •

## 2020-06-12 NOTE — DIETARY NOTE
[facility] [address] Lin Qureshi     Admitting diagnosis:  Abdominal pain, acute [R10.9]  Nausea vomiting and diarrhea [R11.2, R19.7]  Fever, unspecified fever cause [R50.9]  Ground glass opacity present on imaging of lung [R91.8]  D

## 2020-06-12 NOTE — PROGRESS NOTES
[facility]                INFECTIOUS DISEASE PROGRESS NOTE    Shannan Hogan Patient Status:  Inpatient    1980 MRN [id number]   [facility] 3SW-A Attending Margarita Dong, [address] [facility] Day # 5 PCP Timothy Mullins MD     Antibiotics: 06/07/20  0454  06/08/20  1837 06/09/20  0413 06/10/20  0524 06/10/20  1627   * 105*  --  112*  --  119*  --    BUN 9 10  --  8  --  5*  --    CREATSERUM 0.74 0.76  --  0.62  --  0.68  --    GFRAA 117 113  --  130  --  127  --    GFRNAA 102 98  -- opacity present on imaging of lung     Abdominal pain     Nausea & vomiting          ASSESSMENT/PLAN:  1. Pulmonary ground glass infiltrates/hypoxia  Suspicious for COVID-19 pneumonia vs other etiologies  --was tested negative on 6/6, 6/7, 6/9 by PCR, and

## 2020-06-12 NOTE — PLAN OF CARE
Pt is aox4, VSS, afebrile. 2L O2, baseline RA. Has been on RA occasionally today but desats when falling asleep. Tele NSR/ ST. Lovenox. Up independently. C/o epigastric pain, see MAR. C/o nausea, see MAR. IV abx.  Full liquid diet tolerating well, advance a and perform as needed  - Assess and instruct to report SOB or any respiratory difficulty  - Respiratory Therapy support as indicated  - Manage/alleviate anxiety  - Monitor for signs/symptoms of CO2 retention  Outcome: Progressing

## 2020-06-12 NOTE — PAYOR COMM NOTE
--------------  PLEASE FAX INPT DAYS AUTHORIZED ALONG WITH NRD [phone]    [facility] YOU    6/11 CONTINUED STAY REVIEW    Payor: [facility]  Subscriber #:  [id number]  Authorization Number:  [id number]     OPERATIVE REPORT:     DATE OF OPERATION: 06/11/ Location [facility] 3SW-A Attending Hailey Zavala MD   Hosp Day # 4 PCP Frederick Martinez MD      Chief Complaint: Abdominal pain     S: No acute events, afebrile, cough and breathing much improved, still with nausea, mild abdominal pain, denies vomiting lozenge     Date Action Dose Route User    6/11/2020 2311 Given 1 lozenge Oral Prince Latrell RN    6/11/2020 2132 Given 1 lozenge Oral Prince Latrell RN      cefTRIAXone Sodium (ROCEPHIN) 2 g in sodium chloride 0.9% 100 mL MBP/ADD-vantage     Da 6/11/2020 1520 Given 4 mg Intravenous Mark Boothe RN      Pantoprazole Sodium (PROTONIX) 40 mg in Sodium Chloride 0.9 % 10 mL IV push     Date Action Dose Route User    6/12/2020 0839 Given 40 mg Intravenous Mark Boothe RN    6/11/2020 2142 Giv

## 2020-06-12 NOTE — CM/SW NOTE
Care Progression Note:  Active Acute Medical Issue:   34 y/o female with intractable abd pain, n/v/d  Hypoxia, suspicion dylan for COVID-19 PNA vs. Inflammatory, s/p bronch yesterday    Tested for COVID by PCR negative on 6/6, 6/7, 6/9    Other Contributin

## 2020-06-13 VITALS
HEIGHT: 62 IN | BODY MASS INDEX: 35.17 KG/M2 | OXYGEN SATURATION: 97 % | TEMPERATURE: 98 F | WEIGHT: 191.13 LBS | HEART RATE: 81 BPM | RESPIRATION RATE: 18 BRPM | SYSTOLIC BLOOD PRESSURE: 134 MMHG | DIASTOLIC BLOOD PRESSURE: 91 MMHG

## 2020-06-13 PROCEDURE — 99239 HOSP IP/OBS DSCHRG MGMT >30: CPT | Performed by: HOSPITALIST

## 2020-06-13 RX ORDER — HYDROCODONE BITARTRATE AND ACETAMINOPHEN 5; 325 MG/1; MG/1
2 TABLET ORAL EVERY 6 HOURS PRN
Status: DISCONTINUED | OUTPATIENT
Start: 2020-06-13 | End: 2020-06-13

## 2020-06-13 RX ORDER — HYDROCODONE BITARTRATE AND ACETAMINOPHEN 5; 325 MG/1; MG/1
1 TABLET ORAL EVERY 6 HOURS PRN
Status: DISCONTINUED | OUTPATIENT
Start: 2020-06-13 | End: 2020-06-13

## 2020-06-13 RX ORDER — PANTOPRAZOLE SODIUM 40 MG/1
40 TABLET, DELAYED RELEASE ORAL
Qty: 60 TABLET | Refills: 0 | Status: SHIPPED | OUTPATIENT
Start: 2020-06-13 | End: 2020-07-14

## 2020-06-13 RX ORDER — BENZONATATE 100 MG/1
100 CAPSULE ORAL 3 TIMES DAILY
Qty: 30 CAPSULE | Refills: 1 | Status: SHIPPED | OUTPATIENT
Start: 2020-06-13 | End: 2020-07-14

## 2020-06-13 RX ORDER — HYDROCODONE BITARTRATE AND ACETAMINOPHEN 5; 325 MG/1; MG/1
1 TABLET ORAL EVERY 6 HOURS PRN
Qty: 20 TABLET | Refills: 0 | Status: SHIPPED | OUTPATIENT
Start: 2020-06-13 | End: 2020-07-14

## 2020-06-13 RX ORDER — HYDROMORPHONE HYDROCHLORIDE 1 MG/ML
0.5 INJECTION, SOLUTION INTRAMUSCULAR; INTRAVENOUS; SUBCUTANEOUS EVERY 4 HOURS PRN
Status: DISCONTINUED | OUTPATIENT
Start: 2020-06-13 | End: 2020-06-13

## 2020-06-13 RX ORDER — LEVOFLOXACIN 500 MG/1
500 TABLET, FILM COATED ORAL DAILY
Qty: 7 TABLET | Refills: 0 | Status: SHIPPED | OUTPATIENT
Start: 2020-06-13 | End: 2020-06-20

## 2020-06-13 NOTE — PLAN OF CARE
Problem: Patient/Family Goals  Goal: Patient/Family Long Term Goal  Description  Patient's Long Term Goal: Discharge with adequate resources    Interventions:  - Participate and comply with medical treatment plan  - See additional Care Plan goals for spe period  Description  INTERVENTIONS  - Monitor WBC  - Administer growth factors as ordered  - Implement neutropenic guidelines  Outcome: Progressing     Problem: RESPIRATORY - ADULT  Goal: Achieves optimal ventilation and oxygenation  Description  INTERVENT each meal consumed  - Identify factors contributing to decreased intake, treat as appropriate  - Assist with meals as needed  - Monitor I&O, WT and lab values  - Obtain nutritional consult as needed  - Optimize oral hygiene and moisture  - Encourage food f Monitor response to interventions for patient's volume status, including labs, urine output, blood pressure (other measures as available)  - Encourage oral intake as appropriate  - Instruct patient on fluid and nutrition restrictions as appropriate  Outcom

## 2020-06-13 NOTE — PROGRESS NOTES
[facility]  Progress Note     Jazmin Nine Patient Status:  Inpatient    1980 MRN [id number]   [facility] 3SW-A Attending Hernan Holder MD   Hosp Day # 6 PCP Micah Suttno MD     Chief Complaint: Abdominal pain    S: No acu 18.0* 21.0  --  16.0*  --  19.0*  --    ALKPHO 77  --   --  89  --  90  --    AST 16  --   --  20  --  27  --    ALT 14  --   --  14  --  29  --    BILT 0.8  --   --  0.6  --  0.4  --    TP 8.2  --   --  8.7*  --  8.0  --     < > = values in this interval 5. FLORINDA negative, ANCA negative, HIV negative  3. Elevated D-Dimer  1. CTA chest without PE  4. Hyponatremia  1. Mild  2. Continue gentle IVF while PO intake poor  5. Bipolar disorder  1. Lithium   2. Level low  6. Obesity  1. BMI 35  7.  Hypokalemia, reso

## 2020-06-13 NOTE — PROGRESS NOTES
Patient is A/Ox4, complains of generalized discomfort to abdomen, but refusing medications at this time. Vitals are stable and patient is on RA. Patient had BM in morning. Receiving IVF and medications per orders.  Patient has a midline that does not draw b

## 2020-06-13 NOTE — PROGRESS NOTES
[facility] Pulmonary/Critical Care Progress Note     SUBJECTIVE/Interval history: All events, procedures, notes reviewed. No acute events overnight, she feels better today, no dyspnea or cough.   Still with abd pain/ montserrat *  --  112*  --  119*  --    BUN 10  --  8  --  5*  --    CREATSERUM 0.76  --  0.62  --  0.68  --    GFRAA 113  --  130  --  127  --    GFRNAA 98  --  113  --  110  --    CA 8.3*  --  9.3  --  8.8  --      --  133*  --  134*  --    K 3.5   < COLORUR Yellow   CLARITY Clear   SPECGRAVITY >1.060*   GLUUR Negative   BILUR Negative   KETUR Negative   BLOODURINE Moderate*   PHURINE 6.0   PROUR 30 *   UROBILINOGEN 4.0*   NITRITE Negative   LEUUR Negative   WBCUR 5-10*   RBCUR >10*   BACUR None Seen MD  [facility]

## 2020-06-13 NOTE — PROGRESS NOTES
[facility]                INFECTIOUS DISEASE PROGRESS NOTE    Clara [place] Patient Status:  Inpatient    1980 MRN [id number]   [facility] 3SW-A Attending Angela Waters, [address] [facility] Day # 6 PCP Juanjo Luna MD     Antibiotics: Recent Labs   Lab 06/06/20  2300 06/07/20  0454  06/08/20  1837 06/09/20  0413 06/10/20  0524 06/10/20  1627   * 105*  --  112*  --  119*  --    BUN 9 10  --  8  --  5*  --    CREATSERUM 0.74 0.76  --  0.62  --  0.68  --    GFRAA 117 113  -- Weakness of left leg     Pain from implanted hardware, initial encounter , removal hardware left knee  Global 12/08/2017     Pain from implanted hardware, subsequent encounter     Diarrhea     Hypokalemia     Acidosis     Dyspnea, unspecified type     Abdo

## 2020-06-14 NOTE — PAYOR COMM NOTE
Payor:  Sean HARDIN #:  [id number]  Authorization Number:  [id number]     Admit date: 6/7/20  Admit time:  0425  Discharge Date: 6/13/2020

## 2020-06-14 NOTE — DISCHARGE SUMMARY
[facility]  DISCHARGE SUMMARY     Leonor Hauser Patient Status:  Inpatient    1980 MRN [id number]   [facility] 5NW-A Attending No att. providers found   [facility] Day # 6 PCP Edita Klein MD     Date of Admission: 2020  Date of results pending at Discharge:   · none    Consultants:  • ID, pulmonary, GI    Discharge Medication List:     Discharge Medications      START taking these medications      Instructions Prescription details   benzonatate 100 MG Caps  Commonly known as:  TE reviewed: n/a    Follow-up appointment:   Henri Maddox MD  [address] [phone]    In 2 weeks  obtain a chest xray in two weeks - obtain prior to appointment    Appointments for Next 30 Days 6/14/2020 - 7/14/2020

## 2020-06-15 ENCOUNTER — PATIENT OUTREACH (OUTPATIENT)
Dept: CASE MANAGEMENT | Age: 40
End: 2020-06-15

## 2020-06-15 NOTE — PROGRESS NOTES
1st attempt PCP apt request    [address]  [address] [facility] [address]  [phone]

## 2020-06-16 NOTE — PROGRESS NOTES
2nd attempt PCP apt request     Bebeto Meraz  [address] [place] [address]  [phone]

## 2020-06-17 NOTE — PROGRESS NOTES
3rd attempt PCP apt request     [address] [facility]  [address] [address]  [phone]    Unable to contact patient. No apt was made.

## 2020-08-21 ENCOUNTER — LAB ENCOUNTER (OUTPATIENT)
Dept: LAB | Facility: HOSPITAL | Age: 40
End: 2020-08-21
Attending: INTERNAL MEDICINE
Payer: COMMERCIAL

## 2020-08-21 DIAGNOSIS — R11.2 NAUSEA & VOMITING: ICD-10-CM

## 2020-08-22 LAB — SARS-COV-2 RNA RESP QL NAA+PROBE: NOT DETECTED

## 2020-08-24 ENCOUNTER — HOSPITAL ENCOUNTER (OUTPATIENT)
Facility: HOSPITAL | Age: 40
Setting detail: HOSPITAL OUTPATIENT SURGERY
Discharge: HOME | End: 2020-08-24
Attending: INTERNAL MEDICINE | Admitting: INTERNAL MEDICINE
Payer: COMMERCIAL

## 2020-08-24 ENCOUNTER — ANESTHESIA (OUTPATIENT)
Dept: ENDOSCOPY | Facility: HOSPITAL | Age: 40
End: 2020-08-24
Payer: COMMERCIAL

## 2020-08-24 ENCOUNTER — ANESTHESIA EVENT (OUTPATIENT)
Dept: ENDOSCOPY | Facility: HOSPITAL | Age: 40
End: 2020-08-24
Payer: COMMERCIAL

## 2020-08-24 VITALS
HEART RATE: 65 BPM | TEMPERATURE: 98 F | BODY MASS INDEX: 27.38 KG/M2 | DIASTOLIC BLOOD PRESSURE: 62 MMHG | HEIGHT: 61 IN | OXYGEN SATURATION: 100 % | SYSTOLIC BLOOD PRESSURE: 105 MMHG | RESPIRATION RATE: 20 BRPM | WEIGHT: 145 LBS

## 2020-08-24 DIAGNOSIS — R11.2 NAUSEA & VOMITING: Primary | ICD-10-CM

## 2020-08-24 DIAGNOSIS — R10.13 EPIGASTRIC PAIN: ICD-10-CM

## 2020-08-24 DIAGNOSIS — R19.7 DIARRHEA OF PRESUMED INFECTIOUS ORIGIN: ICD-10-CM

## 2020-08-24 LAB
POCT LOT NUMBER: NORMAL
POCT URINE PREGNANCY: NEGATIVE

## 2020-08-24 PROCEDURE — 0DBE8ZX EXCISION OF LARGE INTESTINE, VIA NATURAL OR ARTIFICIAL OPENING ENDOSCOPIC, DIAGNOSTIC: ICD-10-PCS | Performed by: INTERNAL MEDICINE

## 2020-08-24 PROCEDURE — 88305 TISSUE EXAM BY PATHOLOGIST: CPT | Performed by: INTERNAL MEDICINE

## 2020-08-24 PROCEDURE — 0DB98ZX EXCISION OF DUODENUM, VIA NATURAL OR ARTIFICIAL OPENING ENDOSCOPIC, DIAGNOSTIC: ICD-10-PCS | Performed by: INTERNAL MEDICINE

## 2020-08-24 PROCEDURE — 81025 URINE PREGNANCY TEST: CPT | Performed by: INTERNAL MEDICINE

## 2020-08-24 PROCEDURE — 0DB68ZX EXCISION OF STOMACH, VIA NATURAL OR ARTIFICIAL OPENING ENDOSCOPIC, DIAGNOSTIC: ICD-10-PCS | Performed by: INTERNAL MEDICINE

## 2020-08-24 RX ORDER — EPHEDRINE SULFATE 50 MG/ML
INJECTION, SOLUTION INTRAVENOUS AS NEEDED
Status: DISCONTINUED | OUTPATIENT
Start: 2020-08-24 | End: 2020-08-24 | Stop reason: SURG

## 2020-08-24 RX ORDER — SODIUM CHLORIDE, SODIUM LACTATE, POTASSIUM CHLORIDE, CALCIUM CHLORIDE 600; 310; 30; 20 MG/100ML; MG/100ML; MG/100ML; MG/100ML
INJECTION, SOLUTION INTRAVENOUS CONTINUOUS
Status: DISCONTINUED | OUTPATIENT
Start: 2020-08-24 | End: 2020-08-24

## 2020-08-24 RX ORDER — LIDOCAINE HYDROCHLORIDE 10 MG/ML
INJECTION, SOLUTION EPIDURAL; INFILTRATION; INTRACAUDAL; PERINEURAL AS NEEDED
Status: DISCONTINUED | OUTPATIENT
Start: 2020-08-24 | End: 2020-08-24 | Stop reason: SURG

## 2020-08-24 RX ORDER — NALOXONE HYDROCHLORIDE 0.4 MG/ML
80 INJECTION, SOLUTION INTRAMUSCULAR; INTRAVENOUS; SUBCUTANEOUS AS NEEDED
Status: DISCONTINUED | OUTPATIENT
Start: 2020-08-24 | End: 2020-08-24

## 2020-08-24 RX ADMIN — EPHEDRINE SULFATE 10 MG: 50 INJECTION, SOLUTION INTRAVENOUS at 14:45:00

## 2020-08-24 RX ADMIN — EPHEDRINE SULFATE 10 MG: 50 INJECTION, SOLUTION INTRAVENOUS at 14:38:00

## 2020-08-24 RX ADMIN — LIDOCAINE HYDROCHLORIDE 100 MG: 10 INJECTION, SOLUTION EPIDURAL; INFILTRATION; INTRACAUDAL; PERINEURAL at 14:32:00

## 2020-08-24 RX ADMIN — EPHEDRINE SULFATE 5 MG: 50 INJECTION, SOLUTION INTRAVENOUS at 14:33:00

## 2020-08-24 NOTE — OPERATIVE REPORT
Operative Report-Esophagogastroduodenoscopy      PREOPERATIVE DIAGNOSIS/INDICATION: Diarrhea, epigastric pain    POSTOPERTATIVE DIAGNOSIS: Hiatal hernia     PROCEDURE PERFORMED: EGD    INFORMED CONSENT: Once a brief history and phy

## 2020-08-24 NOTE — ANESTHESIA PREPROCEDURE EVALUATION
PRE-OP EVALUATION    Patient Name: Donnie Braun    Pre-op Diagnosis: Diarrhea of presumed infectious origin [R19.7]  Epigastric pain [R10.13]    Procedure(s):  ESOPHAGOGASTRODUODENOSCOPY, COLONOSCOPY      Surgeon(s) and Role:     GRACY Wallace nephroscopy with tube insertion   • OTHER SURGICAL HISTORY  2008    kidney tube insertion removed   • OTHER SURGICAL HISTORY  2008    lithotripsy   • OTHER SURGICAL HISTORY      ashlie breast bxs \"neg\"   • OTHER SURGICAL HISTORY Left 11/2015    Patellofemor Admission:  **None**

## 2020-08-24 NOTE — H&P
[address] [facility] Patient Status:  [facility] Outpatient Surgery    1980 MRN [id number]   [facility] ENDOSCOPY Attending Ora Walter MD   Hosp Day # 0 PCP Wesley Noe MD     History of Pre Flour            DIARRHEA, NAUSEA AND VOMITING    lactated ringers infusion, , Intravenous, Continuous      No current outpatient medications on file.     Review of Systems:  Gastrointestinal: Denies positive test for blood stool, heartburn/indigestion/refl moles, rash, flushing, change in hair or nails. Bone/joint: Denies arthritis, back pain, joint pain, osteoporosis. Heme/Lymphatic: Denies easy bruising, anemia, excessive bleeding, enlarging or painful lymph nodes.   Allergy: Denies medication allergy, la

## 2020-08-24 NOTE — ANESTHESIA POSTPROCEDURE EVALUATION
John Arias Patient Status:  Hospital Outpatient Surgery   Age/Gender 35year old female MRN [id number]   Location [address] [facility]. Attending Allison Fonseca MD   Hosp Day # 0 PCP Ghassan Brandon MD       Anesthesia Post-op N

## 2020-08-24 NOTE — OPERATIVE REPORT
Operative Report-Colonoscopy    PREOPERATIVE DIAGNOSIS/INDICATION: Diarrhea     POSTOPERTATIVE DIAGNOSIS: Diverticulosis, hemorrhoids     PROCEDURE PERFORMED: COLONOSCOPY    INFORMED CONSENT:  Once a brief h

## 2020-09-08 ENCOUNTER — OFFICE VISIT (OUTPATIENT)
Dept: CARDIOLOGY | Age: 40
End: 2020-09-08

## 2020-09-08 VITALS
BODY MASS INDEX: 26.81 KG/M2 | RESPIRATION RATE: 16 BRPM | SYSTOLIC BLOOD PRESSURE: 90 MMHG | WEIGHT: 142 LBS | DIASTOLIC BLOOD PRESSURE: 60 MMHG | HEART RATE: 81 BPM | HEIGHT: 61 IN

## 2020-09-08 DIAGNOSIS — R07.9 CHEST PAIN, UNSPECIFIED TYPE: ICD-10-CM

## 2020-09-08 DIAGNOSIS — R00.2 PALPITATIONS: ICD-10-CM

## 2020-09-08 DIAGNOSIS — E87.6 HYPOKALEMIA: ICD-10-CM

## 2020-09-08 DIAGNOSIS — R06.09 OTHER FORM OF DYSPNEA: Primary | ICD-10-CM

## 2020-09-08 DIAGNOSIS — R94.31 PROLONGED QT INTERVAL: ICD-10-CM

## 2020-09-08 DIAGNOSIS — Z82.41 FAMILY HISTORY OF SUDDEN CARDIAC DEATH IN SISTER: ICD-10-CM

## 2020-09-08 PROBLEM — Z82.49 FAMILY HISTORY OF PREMATURE CAD: Status: ACTIVE | Noted: 2020-09-08

## 2020-09-08 PROBLEM — R06.00 DYSPNEA: Status: ACTIVE | Noted: 2020-06-07

## 2020-09-08 PROCEDURE — 99205 OFFICE O/P NEW HI 60 MIN: CPT | Performed by: INTERNAL MEDICINE

## 2020-09-08 PROCEDURE — 93000 ELECTROCARDIOGRAM COMPLETE: CPT | Performed by: INTERNAL MEDICINE

## 2020-09-08 RX ORDER — LITHIUM CARBONATE 300 MG/1
300 CAPSULE ORAL 2 TIMES DAILY
COMMUNITY

## 2020-09-08 SDOH — HEALTH STABILITY: PHYSICAL HEALTH: ON AVERAGE, HOW MANY DAYS PER WEEK DO YOU ENGAGE IN MODERATE TO STRENUOUS EXERCISE (LIKE A BRISK WALK)?: 0 DAYS

## 2020-09-08 SDOH — HEALTH STABILITY: MENTAL HEALTH: HOW OFTEN DO YOU HAVE A DRINK CONTAINING ALCOHOL?: NEVER

## 2020-09-08 ASSESSMENT — PATIENT HEALTH QUESTIONNAIRE - PHQ9
2. FEELING DOWN, DEPRESSED OR HOPELESS: NOT AT ALL
CLINICAL INTERPRETATION OF PHQ2 SCORE: NO FURTHER SCREENING NEEDED
SUM OF ALL RESPONSES TO PHQ9 QUESTIONS 1 AND 2: 0
CLINICAL INTERPRETATION OF PHQ9 SCORE: NO FURTHER SCREENING NEEDED
1. LITTLE INTEREST OR PLEASURE IN DOING THINGS: NOT AT ALL
SUM OF ALL RESPONSES TO PHQ9 QUESTIONS 1 AND 2: 0

## 2020-09-23 ENCOUNTER — OFFICE VISIT (OUTPATIENT)
Dept: NEUROLOGY | Facility: CLINIC | Age: 40
End: 2020-09-23
Payer: COMMERCIAL

## 2020-09-23 VITALS
DIASTOLIC BLOOD PRESSURE: 58 MMHG | SYSTOLIC BLOOD PRESSURE: 108 MMHG | WEIGHT: 135 LBS | HEART RATE: 60 BPM | BODY MASS INDEX: 26 KG/M2 | RESPIRATION RATE: 14 BRPM

## 2020-09-23 DIAGNOSIS — R20.9 DISTURBANCE OF SKIN SENSATION: Primary | ICD-10-CM

## 2020-09-23 DIAGNOSIS — M79.10 MUSCLE PAIN: ICD-10-CM

## 2020-09-23 PROCEDURE — 3074F SYST BP LT 130 MM HG: CPT | Performed by: OTHER

## 2020-09-23 PROCEDURE — 99204 OFFICE O/P NEW MOD 45 MIN: CPT | Performed by: OTHER

## 2020-09-23 PROCEDURE — 3078F DIAST BP <80 MM HG: CPT | Performed by: OTHER

## 2020-09-23 NOTE — PROGRESS NOTES
Patient states numbness and tingling in both upper and lower extremities. Patient states when the numbness becomes severe her hands and feet start to cramp and the patient is unable to move. This started about 3 weeks ago.  Patient states increase in weakne

## 2020-09-23 NOTE — H&P
Neurology H&P    Leonor Hauser Patient Status:  No patient class for patient encounter    1980 MRN [id number]   Location [address], [address] [place] [address] Drive, [address] [facility] Attending No att. providers found   [facility] Day # 0 PCP Jeneen Dun, MD Meta Skiff taking: Reported on 9/23/2020 ) 60 tablet 1       Problem List:  Patient Active Problem List:     Closed dislocation of left patella  Global 2/11/2016     Patellar instability of left knee     Orthopedic aftercare     Dislocation of patella, left, closed, SURGICAL HISTORY      ashlie breast bxs \"neg\"   • OTHER SURGICAL HISTORY Left 11/2015    Patellofemoral ligament reconstruction and tibial tubercle transfer   • REMOVAL HARDWARE LOWER EXTREMITY Left 9/9/2017    Performed by Nicholas Grace MD at [facility] intact    COORDINATION:  No dysmetria, or intention tremors     REFLEXES: 2+ at biceps, 2+ brachioradialis, 2+ at patella, 2+ at the ankles. GAIT: normal stance, normal toe gait and heel gait, tandem well.              Labs:       Imaging:  No CNS imagi

## 2020-10-09 ENCOUNTER — ANCILLARY PROCEDURE (OUTPATIENT)
Dept: CARDIOLOGY | Age: 40
End: 2020-10-09
Attending: INTERNAL MEDICINE

## 2020-10-09 ENCOUNTER — TELEPHONE (OUTPATIENT)
Dept: CARDIOLOGY | Age: 40
End: 2020-10-09

## 2020-10-09 VITALS — WEIGHT: 128 LBS | HEIGHT: 61 IN | BODY MASS INDEX: 24.17 KG/M2

## 2020-10-09 DIAGNOSIS — R00.2 PALPITATIONS: ICD-10-CM

## 2020-10-09 DIAGNOSIS — R06.09 OTHER FORM OF DYSPNEA: ICD-10-CM

## 2020-10-09 LAB
HEART RATE RESERVE PREDICTED: 31.28 BPM
PEAK HR ACHIEVED: 123 BPM
RESTING HR ACHIEVED: 57 BPM
STRESS BASELINE BP: NORMAL MMHG
STRESS PERCENT HR: 69 %
STRESS POST ESTIMATED WORKLOAD: 8.5 METS
STRESS POST EXERCISE DUR MIN: 5 MIN
STRESS POST EXERCISE DUR SEC: 29 SEC
STRESS POST PEAK BP: NORMAL MMHG
STRESS TARGET HR: 179 BPM

## 2020-10-09 PROCEDURE — X1094 NO CHARGE VISIT: HCPCS

## 2020-10-09 ASSESSMENT — EXERCISE STRESS TEST
STAGE_CATEGORIES: RECOVERY 1
PEAK_RPP: 9016
PEAK_BP: 80/50
PEAK_BP: 80/50
STRESS_SYMPTOMS: DYSPNEA
PEAK_BP: 80/50
PEAK_HR: 57
STAGE_CATEGORIES: RECOVERY 0
STAGE_CATEGORIES: 1
PEAK_METS: 5.5
PEAK_RPP: 5120
PEAK_RPP: 5440
PEAK_HR: 123
PEAK_RPP: 4902
PEAK_BP: 80/50
GRADE: 12
PEAK_HR: 123
STRESS_SYMPTOMS: DYSPNEA
PEAK_BP: 86/54
MPH: 2.5
GRADE: 10
STAGE_CATEGORIES: RECOVERY 2
MPH: 1.7
PEAK_HR: 64
STAGE_CATEGORIES: RESTING
PEAK_HR: 68
PEAK_HR: 98
PEAK_BP: 92/50
PEAK_RPP: 9840
STAGE_CATEGORIES: 2
STRESS_SYMPTOMS: DYSPNEA
PEAK_RPP: 9840

## 2020-10-20 ENCOUNTER — TELEPHONE (OUTPATIENT)
Dept: CARDIOLOGY | Age: 40
End: 2020-10-20

## 2020-10-20 ENCOUNTER — OFFICE VISIT (OUTPATIENT)
Dept: ELECTROPHYSIOLOGY | Facility: HOSPITAL | Age: 40
End: 2020-10-20
Attending: Other
Payer: COMMERCIAL

## 2020-10-20 DIAGNOSIS — M79.10 MUSCLE PAIN: ICD-10-CM

## 2020-10-20 DIAGNOSIS — R20.9 DISTURBANCE OF SKIN SENSATION: ICD-10-CM

## 2020-10-20 PROBLEM — Z82.49 FAMILY HISTORY OF PREMATURE CAD: Status: ACTIVE | Noted: 2020-09-08

## 2020-10-20 PROBLEM — R94.31 PROLONGED QT INTERVAL: Status: ACTIVE | Noted: 2020-09-08

## 2020-10-20 PROBLEM — R00.2 PALPITATIONS: Status: ACTIVE | Noted: 2020-09-08

## 2020-10-20 PROCEDURE — 95913 NRV CNDJ TEST 13/> STUDIES: CPT | Performed by: OTHER

## 2020-10-20 PROCEDURE — 95886 MUSC TEST DONE W/N TEST COMP: CPT | Performed by: OTHER

## 2020-10-20 NOTE — PROCEDURES
Kay  Nerve Conduction & EMG Report                      Rnean Dunn, [address]   EMG department   [address]  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 4.  There is no electrophysiologic evidence of a left cervical or lumbar radiculopathy.       Elizabeth Helton DO  Neurology and Neuromuscular medicine  [facility]

## 2020-10-30 ENCOUNTER — DOCUMENTATION (OUTPATIENT)
Dept: CARDIOLOGY | Age: 40
End: 2020-10-30

## 2020-10-30 DIAGNOSIS — R00.2 PALPITATIONS: Primary | ICD-10-CM

## 2020-10-30 DIAGNOSIS — R94.31 PROLONGED QT INTERVAL: ICD-10-CM

## 2020-11-02 ENCOUNTER — TELEPHONE (OUTPATIENT)
Dept: CARDIOLOGY | Age: 40
End: 2020-11-02

## 2020-11-02 DIAGNOSIS — R00.2 PALPITATIONS: Primary | ICD-10-CM

## 2020-11-02 DIAGNOSIS — R94.31 PROLONGED QT INTERVAL: ICD-10-CM

## 2020-11-02 DIAGNOSIS — R06.09 OTHER FORM OF DYSPNEA: ICD-10-CM

## 2020-11-02 DIAGNOSIS — Z82.41 FAMILY HISTORY OF SUDDEN CARDIAC DEATH IN SISTER: ICD-10-CM

## 2020-11-02 DIAGNOSIS — E87.6 HYPOKALEMIA: ICD-10-CM

## 2020-11-02 DIAGNOSIS — R07.9 CHEST PAIN, UNSPECIFIED TYPE: ICD-10-CM

## 2020-11-04 ENCOUNTER — ANCILLARY PROCEDURE (OUTPATIENT)
Dept: CARDIOLOGY | Age: 40
End: 2020-11-04
Attending: INTERNAL MEDICINE

## 2020-11-04 ENCOUNTER — TELEPHONE (OUTPATIENT)
Dept: CARDIOLOGY | Age: 40
End: 2020-11-04

## 2020-11-04 ENCOUNTER — LAB SERVICES (OUTPATIENT)
Dept: LAB | Age: 40
End: 2020-11-04

## 2020-11-04 DIAGNOSIS — R00.2 PALPITATIONS: ICD-10-CM

## 2020-11-04 DIAGNOSIS — R06.09 OTHER FORM OF DYSPNEA: ICD-10-CM

## 2020-11-04 DIAGNOSIS — R94.31 PROLONGED QT INTERVAL: ICD-10-CM

## 2020-11-04 LAB
ALBUMIN SERPL-MCNC: 3 G/DL (ref 3.6–5.1)
ALBUMIN/GLOB SERPL: 1 {RATIO} (ref 1–2.4)
ALP SERPL-CCNC: 49 UNITS/L (ref 45–117)
ALT SERPL-CCNC: 19 UNITS/L
ANION GAP SERPL CALC-SCNC: 8 MMOL/L (ref 10–20)
AST SERPL-CCNC: 16 UNITS/L
BILIRUB SERPL-MCNC: 0.5 MG/DL (ref 0.2–1)
BUN SERPL-MCNC: 6 MG/DL (ref 6–20)
BUN/CREAT SERPL: 9 (ref 7–25)
CALCIUM SERPL-MCNC: 8.7 MG/DL (ref 8.4–10.2)
CHLORIDE SERPL-SCNC: 112 MMOL/L (ref 98–107)
CHOLEST SERPL-MCNC: 202 MG/DL
CHOLEST/HDLC SERPL: 4.2 {RATIO}
CO2 SERPL-SCNC: 28 MMOL/L (ref 21–32)
CREAT SERPL-MCNC: 0.65 MG/DL (ref 0.51–0.95)
GLOBULIN SER-MCNC: 2.9 G/DL (ref 2–4)
GLUCOSE SERPL-MCNC: 76 MG/DL (ref 65–99)
HDLC SERPL-MCNC: 48 MG/DL
LDLC SERPL CALC-MCNC: 128 MG/DL
LENGTH OF FAST TIME PATIENT: 12 HRS
LENGTH OF FAST TIME PATIENT: 12 HRS
LITHIUM SERPL-SCNC: <0.2 MMOL/L (ref 0.6–1.2)
MAGNESIUM SERPL-MCNC: 2.2 MG/DL (ref 1.7–2.4)
NONHDLC SERPL-MCNC: 154 MG/DL
POTASSIUM SERPL-SCNC: 4.1 MMOL/L (ref 3.4–5.1)
PROT SERPL-MCNC: 5.9 G/DL (ref 6.4–8.2)
SODIUM SERPL-SCNC: 144 MMOL/L (ref 135–145)
TRIGL SERPL-MCNC: 129 MG/DL

## 2020-11-04 PROCEDURE — 83735 ASSAY OF MAGNESIUM: CPT | Performed by: INTERNAL MEDICINE

## 2020-11-04 PROCEDURE — 36415 COLL VENOUS BLD VENIPUNCTURE: CPT | Performed by: INTERNAL MEDICINE

## 2020-11-04 PROCEDURE — 80178 ASSAY OF LITHIUM: CPT | Performed by: INTERNAL MEDICINE

## 2020-11-04 PROCEDURE — 80061 LIPID PANEL: CPT | Performed by: INTERNAL MEDICINE

## 2020-11-04 PROCEDURE — 80053 COMPREHEN METABOLIC PANEL: CPT | Performed by: INTERNAL MEDICINE

## 2021-01-03 ENCOUNTER — APPOINTMENT (OUTPATIENT)
Dept: GENERAL RADIOLOGY | Facility: HOSPITAL | Age: 41
End: 2021-01-03
Attending: EMERGENCY MEDICINE

## 2021-01-03 ENCOUNTER — HOSPITAL ENCOUNTER (EMERGENCY)
Facility: HOSPITAL | Age: 41
Discharge: HOME | End: 2021-01-03
Attending: EMERGENCY MEDICINE

## 2021-01-03 VITALS
TEMPERATURE: 98 F | WEIGHT: 120 LBS | OXYGEN SATURATION: 100 % | HEIGHT: 61 IN | RESPIRATION RATE: 18 BRPM | SYSTOLIC BLOOD PRESSURE: 108 MMHG | HEART RATE: 76 BPM | BODY MASS INDEX: 22.66 KG/M2 | DIASTOLIC BLOOD PRESSURE: 75 MMHG

## 2021-01-03 DIAGNOSIS — S46.211A STRAIN OF BICEPS TENDON, RIGHT, INITIAL ENCOUNTER: Primary | ICD-10-CM

## 2021-01-03 PROCEDURE — 96372 THER/PROPH/DIAG INJ SC/IM: CPT

## 2021-01-03 PROCEDURE — 99283 EMERGENCY DEPT VISIT LOW MDM: CPT

## 2021-01-03 PROCEDURE — 73080 X-RAY EXAM OF ELBOW: CPT | Performed by: EMERGENCY MEDICINE

## 2021-01-03 RX ORDER — KETOROLAC TROMETHAMINE 30 MG/ML
60 INJECTION, SOLUTION INTRAMUSCULAR; INTRAVENOUS ONCE
Status: COMPLETED | OUTPATIENT
Start: 2021-01-03 | End: 2021-01-03

## 2021-01-03 RX ORDER — HYDROCODONE BITARTRATE AND ACETAMINOPHEN 5; 325 MG/1; MG/1
1-2 TABLET ORAL EVERY 6 HOURS PRN
Qty: 10 TABLET | Refills: 0 | Status: SHIPPED | OUTPATIENT
Start: 2021-01-03 | End: 2021-01-10

## 2021-01-03 NOTE — ED PROVIDER NOTES
Patient Seen in: [facility] Emergency Department      History   Patient presents with:  Pain    Stated Complaint: R elbow pain, denies injury. SYmptom started yesterday.  Afebrile     HPI/Subjective:   HPI    About a week ago, patient was putting on nephroscopy with tube insertion   • OTHER SURGICAL HISTORY  2008    kidney tube insertion removed   • OTHER SURGICAL HISTORY  2008    lithotripsy   • OTHER SURGICAL HISTORY      ashlie breast bxs \"neg\"   • OTHER SURGICAL HISTORY Left 11/2015    Patellofemor down the proximal quarter of the forearm. No point tenderness over the olecranon or epicondyles. The triceps musculature is nontender as is the musculature of the forearm. Radial pulses strong. Strength in the hand is excellent.   Sensation intact light

## 2021-01-03 NOTE — ED INITIAL ASSESSMENT (HPI)
C/o R antecubital pain for a week. States pain is progressively worse, is unable to straighten arm out without pain. Denies injury.

## 2021-07-27 ENCOUNTER — APPOINTMENT (OUTPATIENT)
Dept: GENERAL RADIOLOGY | Facility: HOSPITAL | Age: 41
End: 2021-07-27
Attending: EMERGENCY MEDICINE

## 2021-07-27 ENCOUNTER — HOSPITAL ENCOUNTER (EMERGENCY)
Facility: HOSPITAL | Age: 41
Discharge: HOME | End: 2021-07-27
Attending: EMERGENCY MEDICINE

## 2021-07-27 VITALS
DIASTOLIC BLOOD PRESSURE: 77 MMHG | WEIGHT: 125 LBS | TEMPERATURE: 99 F | HEART RATE: 78 BPM | HEIGHT: 61 IN | SYSTOLIC BLOOD PRESSURE: 113 MMHG | BODY MASS INDEX: 23.6 KG/M2 | RESPIRATION RATE: 24 BRPM

## 2021-07-27 DIAGNOSIS — S39.012A STRAIN OF LUMBAR REGION, INITIAL ENCOUNTER: ICD-10-CM

## 2021-07-27 DIAGNOSIS — S16.1XXA STRAIN OF NECK MUSCLE, INITIAL ENCOUNTER: Primary | ICD-10-CM

## 2021-07-27 PROCEDURE — 72100 X-RAY EXAM L-S SPINE 2/3 VWS: CPT | Performed by: EMERGENCY MEDICINE

## 2021-07-27 PROCEDURE — 72040 X-RAY EXAM NECK SPINE 2-3 VW: CPT | Performed by: EMERGENCY MEDICINE

## 2021-07-27 PROCEDURE — 99283 EMERGENCY DEPT VISIT LOW MDM: CPT

## 2021-07-27 PROCEDURE — 96372 THER/PROPH/DIAG INJ SC/IM: CPT

## 2021-07-27 RX ORDER — CYCLOBENZAPRINE HCL 10 MG
10 TABLET ORAL 3 TIMES DAILY PRN
Qty: 20 TABLET | Refills: 0 | Status: SHIPPED | OUTPATIENT
Start: 2021-07-27 | End: 2021-08-03

## 2021-07-27 RX ORDER — DIAZEPAM 5 MG/1
5 TABLET ORAL ONCE
Status: COMPLETED | OUTPATIENT
Start: 2021-07-27 | End: 2021-07-27

## 2021-07-27 RX ORDER — KETOROLAC TROMETHAMINE 30 MG/ML
30 INJECTION, SOLUTION INTRAMUSCULAR; INTRAVENOUS ONCE
Status: COMPLETED | OUTPATIENT
Start: 2021-07-27 | End: 2021-07-27

## 2021-07-27 RX ORDER — TRAZODONE HYDROCHLORIDE 50 MG/1
50 TABLET ORAL NIGHTLY PRN
COMMUNITY

## 2021-07-27 RX ORDER — METHYLPREDNISOLONE 4 MG/1
TABLET ORAL
Qty: 1 EACH | Refills: 0 | Status: SHIPPED | OUTPATIENT
Start: 2021-07-27

## 2021-07-27 RX ORDER — LAMOTRIGINE 25 MG/1
25 TABLET ORAL DAILY
COMMUNITY

## 2021-07-27 RX ORDER — ACETAMINOPHEN 500 MG
1000 TABLET ORAL ONCE
Status: COMPLETED | OUTPATIENT
Start: 2021-07-27 | End: 2021-07-27
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
## 2022-01-03 ENCOUNTER — HOSPITAL ENCOUNTER (EMERGENCY)
Age: 42
Discharge: HOME | End: 2022-01-03
Attending: EMERGENCY MEDICINE
Payer: MEDICAID

## 2022-01-03 ENCOUNTER — APPOINTMENT (OUTPATIENT)
Dept: CT IMAGING | Age: 42
End: 2022-01-03
Attending: EMERGENCY MEDICINE
Payer: MEDICAID

## 2022-01-03 VITALS
HEART RATE: 80 BPM | OXYGEN SATURATION: 98 % | SYSTOLIC BLOOD PRESSURE: 135 MMHG | TEMPERATURE: 98 F | DIASTOLIC BLOOD PRESSURE: 90 MMHG | RESPIRATION RATE: 16 BRPM | BODY MASS INDEX: 30.21 KG/M2 | HEIGHT: 61 IN | WEIGHT: 160 LBS

## 2022-01-03 DIAGNOSIS — N12 PYELONEPHRITIS: ICD-10-CM

## 2022-01-03 DIAGNOSIS — R10.9 ABDOMINAL PAIN OF UNKNOWN ETIOLOGY: Primary | ICD-10-CM

## 2022-01-03 LAB
ALBUMIN SERPL-MCNC: 3.4 G/DL (ref 3.4–5)
ALBUMIN/GLOB SERPL: 0.7 {RATIO} (ref 1–2)
ALP LIVER SERPL-CCNC: 79 U/L
ALT SERPL-CCNC: 29 U/L
ANION GAP SERPL CALC-SCNC: 5 MMOL/L (ref 0–18)
AST SERPL-CCNC: 22 U/L (ref 15–37)
B-HCG UR QL: NEGATIVE
BASOPHILS # BLD AUTO: 0.04 X10(3) UL (ref 0–0.2)
BASOPHILS NFR BLD AUTO: 0.5 %
BILIRUB SERPL-MCNC: 0.2 MG/DL (ref 0.1–2)
BILIRUB UR QL STRIP.AUTO: NEGATIVE
BUN BLD-MCNC: 9 MG/DL (ref 7–18)
CALCIUM BLD-MCNC: 8.6 MG/DL (ref 8.5–10.1)
CHLORIDE SERPL-SCNC: 110 MMOL/L (ref 98–112)
CO2 SERPL-SCNC: 25 MMOL/L (ref 21–32)
COLOR UR AUTO: YELLOW
CREAT BLD-MCNC: 0.81 MG/DL
EOSINOPHIL # BLD AUTO: 0.04 X10(3) UL (ref 0–0.7)
EOSINOPHIL NFR BLD AUTO: 0.5 %
ERYTHROCYTE [DISTWIDTH] IN BLOOD BY AUTOMATED COUNT: 12.3 %
GLOBULIN PLAS-MCNC: 4.6 G/DL (ref 2.8–4.4)
GLUCOSE BLD-MCNC: 90 MG/DL (ref 70–99)
GLUCOSE UR STRIP.AUTO-MCNC: NEGATIVE MG/DL
HCT VFR BLD AUTO: 44.8 %
HGB BLD-MCNC: 14.5 G/DL
IMM GRANULOCYTES # BLD AUTO: 0.04 X10(3) UL (ref 0–1)
IMM GRANULOCYTES NFR BLD: 0.5 %
KETONES UR STRIP.AUTO-MCNC: NEGATIVE MG/DL
LYMPHOCYTES # BLD AUTO: 1.82 X10(3) UL (ref 1–4)
LYMPHOCYTES NFR BLD AUTO: 25 %
MCH RBC QN AUTO: 29.5 PG (ref 26–34)
MCHC RBC AUTO-ENTMCNC: 32.4 G/DL (ref 31–37)
MCV RBC AUTO: 91.2 FL
MONOCYTES # BLD AUTO: 0.54 X10(3) UL (ref 0.1–1)
MONOCYTES NFR BLD AUTO: 7.4 %
NEUTROPHILS # BLD AUTO: 4.8 X10 (3) UL (ref 1.5–7.7)
NEUTROPHILS # BLD AUTO: 4.8 X10(3) UL (ref 1.5–7.7)
NEUTROPHILS NFR BLD AUTO: 66.1 %
NITRITE UR QL STRIP.AUTO: NEGATIVE
OSMOLALITY SERPL CALC.SUM OF ELEC: 288 MOSM/KG (ref 275–295)
PH UR STRIP.AUTO: 6 [PH] (ref 5–8)
PLATELET # BLD AUTO: 346 10(3)UL (ref 150–450)
POTASSIUM SERPL-SCNC: 4.1 MMOL/L (ref 3.5–5.1)
PROT SERPL-MCNC: 8 G/DL (ref 6.4–8.2)
RBC # BLD AUTO: 4.91 X10(6)UL
RBC #/AREA URNS AUTO: >10 /HPF
SODIUM SERPL-SCNC: 140 MMOL/L (ref 136–145)
SP GR UR STRIP.AUTO: >=1.03 (ref 1–1.03)
UROBILINOGEN UR STRIP.AUTO-MCNC: 0.2 MG/DL
WBC # BLD AUTO: 7.3 X10(3) UL (ref 4–11)
WBC #/AREA URNS AUTO: >50 /HPF

## 2022-01-03 PROCEDURE — 87186 SC STD MICRODIL/AGAR DIL: CPT | Performed by: EMERGENCY MEDICINE

## 2022-01-03 PROCEDURE — 99284 EMERGENCY DEPT VISIT MOD MDM: CPT

## 2022-01-03 PROCEDURE — 87086 URINE CULTURE/COLONY COUNT: CPT | Performed by: EMERGENCY MEDICINE

## 2022-01-03 PROCEDURE — 74176 CT ABD & PELVIS W/O CONTRAST: CPT | Performed by: EMERGENCY MEDICINE

## 2022-01-03 PROCEDURE — 96375 TX/PRO/DX INJ NEW DRUG ADDON: CPT

## 2022-01-03 PROCEDURE — 87088 URINE BACTERIA CULTURE: CPT | Performed by: EMERGENCY MEDICINE

## 2022-01-03 PROCEDURE — 80053 COMPREHEN METABOLIC PANEL: CPT | Performed by: EMERGENCY MEDICINE

## 2022-01-03 PROCEDURE — 85025 COMPLETE CBC W/AUTO DIFF WBC: CPT | Performed by: EMERGENCY MEDICINE

## 2022-01-03 PROCEDURE — 81025 URINE PREGNANCY TEST: CPT

## 2022-01-03 PROCEDURE — 96361 HYDRATE IV INFUSION ADD-ON: CPT

## 2022-01-03 PROCEDURE — 81001 URINALYSIS AUTO W/SCOPE: CPT | Performed by: EMERGENCY MEDICINE

## 2022-01-03 PROCEDURE — 96365 THER/PROPH/DIAG IV INF INIT: CPT

## 2022-01-03 RX ORDER — METOCLOPRAMIDE 10 MG/1
10 TABLET ORAL 3 TIMES DAILY PRN
Qty: 20 TABLET | Refills: 0 | Status: SHIPPED | OUTPATIENT
Start: 2022-01-03 | End: 2022-02-02

## 2022-01-03 RX ORDER — KETOROLAC TROMETHAMINE 15 MG/ML
15 INJECTION, SOLUTION INTRAMUSCULAR; INTRAVENOUS ONCE
Status: COMPLETED | OUTPATIENT
Start: 2022-01-03 | End: 2022-01-03

## 2022-01-03 RX ORDER — ONDANSETRON 2 MG/ML
4 INJECTION INTRAMUSCULAR; INTRAVENOUS ONCE
Status: COMPLETED | OUTPATIENT
Start: 2022-01-03 | End: 2022-01-03

## 2022-01-03 RX ORDER — CEFADROXIL 500 MG/1
500 CAPSULE ORAL 2 TIMES DAILY
Qty: 20 CAPSULE | Refills: 0 | Status: SHIPPED | OUTPATIENT
Start: 2022-01-03 | End: 2022-01-13

## 2022-01-03 RX ORDER — HYDROMORPHONE HYDROCHLORIDE 1 MG/ML
0.5 INJECTION, SOLUTION INTRAMUSCULAR; INTRAVENOUS; SUBCUTANEOUS ONCE
Status: COMPLETED | OUTPATIENT
Start: 2022-01-03 | End: 2022-01-03

## 2022-01-03 NOTE — ED PROVIDER NOTES
Patient Seen in: THE [facility] Emergency Department In [place]      History   Patient presents with:  Abdomen/Flank Pain  Urinary Symptoms  GI Bleeding    Stated Complaint: back pain radiating to stomach, uti symptoms, today noticed blood in stool     [place] UNLISTED  2014    cervical spine discectomy                Social History    Tobacco Use      Smoking status: Former Smoker        Packs/day: 0.50        Years: 10.00        Pack years: 5        Quit date: 2001        Years since quittin.1      S and oriented x3. Muscle strength and sensory exam is grossly normal.  And the patient is neurologically intact with no focal findings.          ED Course     Labs Reviewed   URINALYSIS WITH CULTURE REFLEX - Abnormal; Notable for the following components: the CT scanner workstation to localize potential stones in the cranio-caudal plane. Dose reduction techniques were used.  Dose information is transmitted to the [facility] ([address] [place] [address] of Radiology) Ignacio Albrecht [address] ([address]) which includes the cholelithiasis or gallbladder sludge. There is no evidence of cholecystitis.    Dictated by (CST): Rosana Rodriguez MD on 1/03/2022 at 3:44 PM     Finalized by (CST): Rosana Rodriguez MD on 1/03/2022 at 3:49 PM         The patient's comprehensive is grossly norm Oral Tab  Take 1 tablet (10 mg total) by mouth 3 (three) times daily as needed.   Qty: 20 tablet Refills: 0

## 2022-02-04 ENCOUNTER — HOSPITAL ENCOUNTER (EMERGENCY)
Facility: HOSPITAL | Age: 42
Discharge: HOME | End: 2022-02-04
Attending: PEDIATRICS
Payer: OTHER MISCELLANEOUS

## 2022-02-04 VITALS
TEMPERATURE: 98 F | BODY MASS INDEX: 29.27 KG/M2 | HEART RATE: 96 BPM | WEIGHT: 155 LBS | HEIGHT: 61 IN | RESPIRATION RATE: 20 BRPM | SYSTOLIC BLOOD PRESSURE: 125 MMHG | DIASTOLIC BLOOD PRESSURE: 78 MMHG | OXYGEN SATURATION: 95 %

## 2022-02-04 DIAGNOSIS — S41.111A ARM LACERATION, RIGHT, INITIAL ENCOUNTER: Primary | ICD-10-CM

## 2022-02-04 PROCEDURE — 12002 RPR S/N/AX/GEN/TRNK2.6-7.5CM: CPT

## 2022-02-04 PROCEDURE — 99283 EMERGENCY DEPT VISIT LOW MDM: CPT

## 2022-02-04 PROCEDURE — 99282 EMERGENCY DEPT VISIT SF MDM: CPT

## 2022-02-04 NOTE — ED INITIAL ASSESSMENT (HPI)
Patient in with lac to left forearm.  Porcelain bowl fell while she was cleaning, she works for [employer]

## 2022-03-16 ENCOUNTER — HOSPITAL ENCOUNTER (EMERGENCY)
Age: 42
Discharge: HOME | End: 2022-03-16
Attending: EMERGENCY MEDICINE

## 2022-03-16 ENCOUNTER — APPOINTMENT (OUTPATIENT)
Dept: GENERAL RADIOLOGY | Age: 42
End: 2022-03-16
Attending: EMERGENCY MEDICINE

## 2022-03-16 VITALS
OXYGEN SATURATION: 100 % | SYSTOLIC BLOOD PRESSURE: 115 MMHG | HEART RATE: 82 BPM | DIASTOLIC BLOOD PRESSURE: 83 MMHG | RESPIRATION RATE: 16 BRPM | TEMPERATURE: 98.1 F

## 2022-03-16 DIAGNOSIS — M25.521 RIGHT ELBOW PAIN: Primary | ICD-10-CM

## 2022-03-16 PROCEDURE — 73080 X-RAY EXAM OF ELBOW: CPT

## 2022-03-16 PROCEDURE — 99283 EMERGENCY DEPT VISIT LOW MDM: CPT

## 2022-03-16 PROCEDURE — 10002803 HB RX 637: Performed by: EMERGENCY MEDICINE

## 2022-03-16 RX ORDER — HYDROCODONE BITARTRATE AND ACETAMINOPHEN 5; 325 MG/1; MG/1
1 TABLET ORAL ONCE
Status: COMPLETED | OUTPATIENT
Start: 2022-03-16 | End: 2022-03-16

## 2022-03-16 RX ORDER — HYDROCODONE BITARTRATE AND ACETAMINOPHEN 5; 325 MG/1; MG/1
1 TABLET ORAL EVERY 4 HOURS PRN
Qty: 12 TABLET | Refills: 0 | Status: SHIPPED | OUTPATIENT
Start: 2022-03-16 | End: 2022-03-19

## 2022-03-16 RX ADMIN — HYDROCODONE BITARTRATE AND ACETAMINOPHEN 1 TABLET: 5; 325 TABLET ORAL at 10:51

## 2022-03-16 ASSESSMENT — ENCOUNTER SYMPTOMS
WEAKNESS: 0
FEVER: 0
DIZZINESS: 0
BACK PAIN: 0
CHEST TIGHTNESS: 0
COUGH: 0
WOUND: 0
ABDOMINAL PAIN: 0
SINUS PAIN: 0
DIARRHEA: 0
VOMITING: 0
EYE PAIN: 0
NUMBNESS: 0
CONFUSION: 0
SHORTNESS OF BREATH: 0

## 2022-03-16 ASSESSMENT — PAIN SCALES - GENERAL: PAINLEVEL_OUTOF10: 2

## 2022-09-05 ENCOUNTER — HOSPITAL ENCOUNTER (EMERGENCY)
Facility: HOSPITAL | Age: 42
Discharge: HOME | End: 2022-09-06
Attending: EMERGENCY MEDICINE
Payer: MEDICAID

## 2022-09-05 DIAGNOSIS — T78.40XA ALLERGIC REACTION, INITIAL ENCOUNTER: Primary | ICD-10-CM

## 2022-09-05 DIAGNOSIS — L50.0 ALLERGIC URTICARIA: ICD-10-CM

## 2022-09-05 PROCEDURE — 99284 EMERGENCY DEPT VISIT MOD MDM: CPT

## 2022-09-05 PROCEDURE — 96374 THER/PROPH/DIAG INJ IV PUSH: CPT

## 2022-09-05 PROCEDURE — S0028 INJECTION, FAMOTIDINE, 20 MG: HCPCS

## 2022-09-05 PROCEDURE — 96361 HYDRATE IV INFUSION ADD-ON: CPT

## 2022-09-05 PROCEDURE — 96375 TX/PRO/DX INJ NEW DRUG ADDON: CPT

## 2022-09-05 RX ORDER — KETOROLAC TROMETHAMINE 30 MG/ML
30 INJECTION, SOLUTION INTRAMUSCULAR; INTRAVENOUS ONCE
Status: COMPLETED | OUTPATIENT
Start: 2022-09-05 | End: 2022-09-05

## 2022-09-05 RX ORDER — FAMOTIDINE 20 MG/1
20 TABLET, FILM COATED ORAL 2 TIMES DAILY
Qty: 28 TABLET | Refills: 0 | Status: SHIPPED | OUTPATIENT
Start: 2022-09-05 | End: 2022-09-19

## 2022-09-05 RX ORDER — METHYLPREDNISOLONE SODIUM SUCCINATE 125 MG/2ML
125 INJECTION, POWDER, LYOPHILIZED, FOR SOLUTION INTRAMUSCULAR; INTRAVENOUS ONCE
Status: COMPLETED | OUTPATIENT
Start: 2022-09-05 | End: 2022-09-05

## 2022-09-05 RX ORDER — PREDNISONE 50 MG/1
50 TABLET ORAL DAILY
Qty: 5 TABLET | Refills: 0 | Status: SHIPPED | OUTPATIENT
Start: 2022-09-05

## 2022-09-05 RX ORDER — METHYLPREDNISOLONE SODIUM SUCCINATE 125 MG/2ML
INJECTION, POWDER, LYOPHILIZED, FOR SOLUTION INTRAMUSCULAR; INTRAVENOUS
Status: COMPLETED
Start: 2022-09-05 | End: 2022-09-05

## 2022-09-05 RX ORDER — DIPHENHYDRAMINE HCL 25 MG
50 CAPSULE ORAL EVERY 6 HOURS PRN
Qty: 30 CAPSULE | Refills: 0 | Status: SHIPPED | OUTPATIENT
Start: 2022-09-05

## 2022-09-05 RX ORDER — FAMOTIDINE 10 MG/ML
INJECTION, SOLUTION INTRAVENOUS
Status: COMPLETED
Start: 2022-09-05 | End: 2022-09-05

## 2022-09-05 RX ORDER — EPINEPHRINE 0.3 MG/.3ML
0.3 INJECTION SUBCUTANEOUS
Qty: 1 EACH | Refills: 0 | Status: SHIPPED | OUTPATIENT
Start: 2022-09-05 | End: 2022-10-05

## 2022-09-05 RX ORDER — LORAZEPAM 1 MG/1
1 TABLET ORAL ONCE
Status: COMPLETED | OUTPATIENT
Start: 2022-09-05 | End: 2022-09-05

## 2022-09-05 RX ORDER — FAMOTIDINE 10 MG/ML
20 INJECTION, SOLUTION INTRAVENOUS ONCE
Status: COMPLETED | OUTPATIENT
Start: 2022-09-05 | End: 2022-09-05

## 2022-09-06 VITALS
BODY MASS INDEX: 23.6 KG/M2 | HEIGHT: 61 IN | DIASTOLIC BLOOD PRESSURE: 60 MMHG | RESPIRATION RATE: 16 BRPM | HEART RATE: 66 BPM | WEIGHT: 125 LBS | SYSTOLIC BLOOD PRESSURE: 100 MMHG | OXYGEN SATURATION: 99 % | TEMPERATURE: 98 F

## 2022-09-06 NOTE — ED INITIAL ASSESSMENT (HPI)
Pt c/o allergic reaction onset 2115, hives all over and states throat feels like it's hard to swallow. EMS gave benadryl 50 mg IV en route. Resps easy, nonlabored.

## 2023-04-10 ENCOUNTER — HOSPITAL ENCOUNTER (EMERGENCY)
Age: 43
Discharge: HOME | End: 2023-04-11
Payer: OTHER MISCELLANEOUS

## 2023-04-10 ENCOUNTER — APPOINTMENT (OUTPATIENT)
Dept: GENERAL RADIOLOGY | Age: 43
End: 2023-04-10
Payer: OTHER MISCELLANEOUS

## 2023-04-10 VITALS
WEIGHT: 120 LBS | SYSTOLIC BLOOD PRESSURE: 123 MMHG | DIASTOLIC BLOOD PRESSURE: 84 MMHG | RESPIRATION RATE: 16 BRPM | TEMPERATURE: 98 F | BODY MASS INDEX: 22.66 KG/M2 | HEART RATE: 67 BPM | HEIGHT: 61 IN | OXYGEN SATURATION: 98 %

## 2023-04-10 DIAGNOSIS — S00.33XA CONTUSION OF NOSE, INITIAL ENCOUNTER: ICD-10-CM

## 2023-04-10 DIAGNOSIS — S02.2XXA CLOSED FRACTURE OF NASAL BONE, INITIAL ENCOUNTER: Primary | ICD-10-CM

## 2023-04-10 PROCEDURE — 70160 X-RAY EXAM OF NASAL BONES: CPT

## 2023-04-10 PROCEDURE — 99283 EMERGENCY DEPT VISIT LOW MDM: CPT

## 2023-04-10 PROCEDURE — 99284 EMERGENCY DEPT VISIT MOD MDM: CPT

## 2023-04-10 RX ORDER — IBUPROFEN 600 MG/1
600 TABLET ORAL ONCE
Status: COMPLETED | OUTPATIENT
Start: 2023-04-10 | End: 2023-04-10

## 2023-04-10 RX ORDER — IBUPROFEN 600 MG/1
TABLET ORAL
Status: COMPLETED
Start: 2023-04-10 | End: 2023-04-10
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
## 2023-04-11 NOTE — ED QUICK NOTES
PT works for [employer] in [employer] This location is not contracted with [facility] for post-accident drug and alcohol screening.

## 2023-04-20 ENCOUNTER — HOSPITAL ENCOUNTER (OUTPATIENT)
Dept: CT IMAGING | Facility: HOSPITAL | Age: 43
Discharge: HOME | End: 2023-04-20
Attending: PREVENTIVE MEDICINE
Payer: OTHER MISCELLANEOUS

## 2023-04-20 ENCOUNTER — OFFICE VISIT (OUTPATIENT)
Dept: OTHER | Facility: HOSPITAL | Age: 43
End: 2023-04-20
Attending: PREVENTIVE MEDICINE
Payer: OTHER MISCELLANEOUS

## 2023-04-20 DIAGNOSIS — S00.83XA FACIAL CONTUSION: ICD-10-CM

## 2023-04-20 DIAGNOSIS — S00.83XA FACIAL CONTUSION: Primary | ICD-10-CM

## 2023-04-20 PROCEDURE — 70486 CT MAXILLOFACIAL W/O DYE: CPT | Performed by: PREVENTIVE MEDICINE

## 2023-05-08 ENCOUNTER — OFFICE VISIT (OUTPATIENT)
Facility: LOCATION | Age: 43
End: 2023-05-08
Payer: OTHER MISCELLANEOUS

## 2023-05-08 DIAGNOSIS — J32.0 SINUSITIS, MAXILLARY, CHRONIC: Primary | ICD-10-CM

## 2023-05-08 DIAGNOSIS — S02.2XXA CLOSED FRACTURE OF NASAL BONE, INITIAL ENCOUNTER: ICD-10-CM

## 2023-05-08 RX ORDER — CEFUROXIME AXETIL 250 MG/1
250 TABLET ORAL 2 TIMES DAILY
Qty: 20 TABLET | Refills: 0 | Status: SHIPPED | OUTPATIENT
Start: 2023-05-08

## 2023-05-08 RX ORDER — FLUTICASONE PROPIONATE 50 MCG
2 SPRAY, SUSPENSION (ML) NASAL DAILY
Qty: 16 G | Refills: 3 | Status: SHIPPED | OUTPATIENT
Start: 2023-05-08

## 2023-05-14 ENCOUNTER — HOSPITAL ENCOUNTER (EMERGENCY)
Age: 43
Discharge: HOME | End: 2023-05-14
Attending: EMERGENCY MEDICINE
Payer: COMMERCIAL

## 2023-05-14 VITALS
SYSTOLIC BLOOD PRESSURE: 102 MMHG | WEIGHT: 125 LBS | OXYGEN SATURATION: 98 % | HEART RATE: 68 BPM | RESPIRATION RATE: 16 BRPM | TEMPERATURE: 98 F | HEIGHT: 61 IN | BODY MASS INDEX: 23.6 KG/M2 | DIASTOLIC BLOOD PRESSURE: 61 MMHG

## 2023-05-14 DIAGNOSIS — R04.0 EPISTAXIS: Primary | ICD-10-CM

## 2023-05-14 PROCEDURE — 99282 EMERGENCY DEPT VISIT SF MDM: CPT

## 2023-05-14 PROCEDURE — 99283 EMERGENCY DEPT VISIT LOW MDM: CPT

## 2023-05-14 RX ORDER — OXYMETAZOLINE HYDROCHLORIDE 0.05 G/100ML
1 SPRAY NASAL ONCE
Status: COMPLETED | OUTPATIENT
Start: 2023-05-14 | End: 2023-05-14

## 2023-05-15 ENCOUNTER — TELEPHONE (OUTPATIENT)
Facility: LOCATION | Age: 43
End: 2023-05-15

## 2023-05-15 NOTE — TELEPHONE ENCOUNTER
PT CAME TO SEE DR. MALAVE AND HE CLEARED PT TO GO BACK TO WORK. HOWEVER HALF WAY THROUGH YOUR SHIFT THE NOSE BLEEDS STARTED OCCURRING. WENT TO THE ER AND THEY SAID THAT HEAVY LIFTING COULD BE THE CAUSE OF THE NOSE BLEEDS. PT IS ASKING DR. MALAVE IF SHE SHOULD STOP WORKING UNTIL PT SEES HIM OR IF DR. MALAVE COULD PUT A WEIGHT LIMIT ON MUCH PT CAN LIFT.

## 2023-05-15 NOTE — DISCHARGE INSTRUCTIONS
Chris put Vaseline in nose before you go to bed, if it begins to bleed spray Afrin up both nostrils and put the nasal pinch on.   Set a timer on her phone and keep it on for 20 minutes

## 2023-05-15 NOTE — ED INITIAL ASSESSMENT (HPI)
Patient sustained a broken nose approximately 1 month ago, yesterday while at work she was lifting heavy boxes and her nose began to bleed, it has bleed off and on since.

## 2023-06-02 ENCOUNTER — OFFICE VISIT (OUTPATIENT)
Facility: LOCATION | Age: 43
End: 2023-06-02
Payer: OTHER MISCELLANEOUS

## 2023-06-02 DIAGNOSIS — R04.0 EPISTAXIS: Primary | ICD-10-CM

## 2023-06-02 DIAGNOSIS — J32.0 CHRONIC MAXILLARY SINUSITIS: ICD-10-CM

## 2023-06-14 ENCOUNTER — HOSPITAL ENCOUNTER (EMERGENCY)
Age: 43
Discharge: HOME | End: 2023-06-14
Attending: EMERGENCY MEDICINE
Payer: COMMERCIAL

## 2023-06-14 ENCOUNTER — APPOINTMENT (OUTPATIENT)
Dept: GENERAL RADIOLOGY | Age: 43
End: 2023-06-14
Attending: EMERGENCY MEDICINE
Payer: COMMERCIAL

## 2023-06-14 VITALS
SYSTOLIC BLOOD PRESSURE: 109 MMHG | DIASTOLIC BLOOD PRESSURE: 82 MMHG | OXYGEN SATURATION: 98 % | RESPIRATION RATE: 18 BRPM | HEART RATE: 63 BPM | HEIGHT: 61 IN | TEMPERATURE: 98 F | WEIGHT: 120 LBS | BODY MASS INDEX: 22.66 KG/M2

## 2023-06-14 DIAGNOSIS — R07.9 CHEST PAIN OF UNCERTAIN ETIOLOGY: Primary | ICD-10-CM

## 2023-06-14 LAB
ALBUMIN SERPL-MCNC: 3.7 G/DL (ref 3.4–5)
ALBUMIN/GLOB SERPL: 1.2 {RATIO} (ref 1–2)
ALP LIVER SERPL-CCNC: 68 U/L
ALT SERPL-CCNC: 19 U/L
ANION GAP SERPL CALC-SCNC: 5 MMOL/L (ref 0–18)
AST SERPL-CCNC: 13 U/L (ref 15–37)
ATRIAL RATE: 66 BPM
BASOPHILS # BLD AUTO: 0.08 X10(3) UL (ref 0–0.2)
BASOPHILS NFR BLD AUTO: 0.8 %
BILIRUB SERPL-MCNC: 0.2 MG/DL (ref 0.1–2)
BUN BLD-MCNC: 17 MG/DL (ref 7–18)
CALCIUM BLD-MCNC: 8.4 MG/DL (ref 8.5–10.1)
CHLORIDE SERPL-SCNC: 106 MMOL/L (ref 98–112)
CO2 SERPL-SCNC: 25 MMOL/L (ref 21–32)
CREAT BLD-MCNC: 0.78 MG/DL
EOSINOPHIL # BLD AUTO: 0.33 X10(3) UL (ref 0–0.7)
EOSINOPHIL NFR BLD AUTO: 3.3 %
ERYTHROCYTE [DISTWIDTH] IN BLOOD BY AUTOMATED COUNT: 12.8 %
GFR SERPLBLD BASED ON 1.73 SQ M-ARVRAT: 97 ML/MIN/1.73M2 (ref 60–?)
GLOBULIN PLAS-MCNC: 3 G/DL (ref 2.8–4.4)
GLUCOSE BLD-MCNC: 82 MG/DL (ref 70–99)
HCT VFR BLD AUTO: 35.8 %
HGB BLD-MCNC: 11.8 G/DL
IMM GRANULOCYTES # BLD AUTO: 0.04 X10(3) UL (ref 0–1)
IMM GRANULOCYTES NFR BLD: 0.4 %
LYMPHOCYTES # BLD AUTO: 3.15 X10(3) UL (ref 1–4)
LYMPHOCYTES NFR BLD AUTO: 31.5 %
MCH RBC QN AUTO: 30.2 PG (ref 26–34)
MCHC RBC AUTO-ENTMCNC: 33 G/DL (ref 31–37)
MCV RBC AUTO: 91.6 FL
MONOCYTES # BLD AUTO: 0.71 X10(3) UL (ref 0.1–1)
MONOCYTES NFR BLD AUTO: 7.1 %
NEUTROPHILS # BLD AUTO: 5.69 X10 (3) UL (ref 1.5–7.7)
NEUTROPHILS # BLD AUTO: 5.69 X10(3) UL (ref 1.5–7.7)
NEUTROPHILS NFR BLD AUTO: 56.9 %
OSMOLALITY SERPL CALC.SUM OF ELEC: 283 MOSM/KG (ref 275–295)
P AXIS: 66 DEGREES
P-R INTERVAL: 154 MS
PLATELET # BLD AUTO: 300 10(3)UL (ref 150–450)
POTASSIUM SERPL-SCNC: 3.6 MMOL/L (ref 3.5–5.1)
PROT SERPL-MCNC: 6.7 G/DL (ref 6.4–8.2)
Q-T INTERVAL: 422 MS
QRS DURATION: 92 MS
QTC CALCULATION (BEZET): 442 MS
R AXIS: 56 DEGREES
RBC # BLD AUTO: 3.91 X10(6)UL
SODIUM SERPL-SCNC: 136 MMOL/L (ref 136–145)
T AXIS: 57 DEGREES
TROPONIN I HIGH SENSITIVITY: 6 NG/L
TROPONIN I HIGH SENSITIVITY: 7 NG/L
VENTRICULAR RATE: 66 BPM
WBC # BLD AUTO: 10 X10(3) UL (ref 4–11)

## 2023-06-14 PROCEDURE — 71045 X-RAY EXAM CHEST 1 VIEW: CPT | Performed by: EMERGENCY MEDICINE

## 2023-06-14 PROCEDURE — 93005 ELECTROCARDIOGRAM TRACING: CPT

## 2023-06-14 PROCEDURE — 99285 EMERGENCY DEPT VISIT HI MDM: CPT

## 2023-06-14 PROCEDURE — 84484 ASSAY OF TROPONIN QUANT: CPT | Performed by: EMERGENCY MEDICINE

## 2023-06-14 PROCEDURE — 80053 COMPREHEN METABOLIC PANEL: CPT | Performed by: EMERGENCY MEDICINE

## 2023-06-14 PROCEDURE — 36415 COLL VENOUS BLD VENIPUNCTURE: CPT

## 2023-06-14 PROCEDURE — 93010 ELECTROCARDIOGRAM REPORT: CPT

## 2023-06-14 PROCEDURE — 85025 COMPLETE CBC W/AUTO DIFF WBC: CPT | Performed by: EMERGENCY MEDICINE

## 2023-06-14 PROCEDURE — 99284 EMERGENCY DEPT VISIT MOD MDM: CPT

## 2023-06-14 RX ORDER — ONDANSETRON 2 MG/ML
4 INJECTION INTRAMUSCULAR; INTRAVENOUS ONCE
Status: DISCONTINUED | OUTPATIENT
Start: 2023-06-14 | End: 2023-06-14

## 2023-06-14 RX ORDER — LIDOCAINE 50 MG/G
1 PATCH TOPICAL EVERY 24 HOURS
Qty: 30 PATCH | Refills: 0 | Status: SHIPPED | OUTPATIENT
Start: 2023-06-14

## 2023-07-12 ENCOUNTER — OFFICE VISIT (OUTPATIENT)
Facility: LOCATION | Age: 43
End: 2023-07-12
Payer: OTHER MISCELLANEOUS

## 2023-07-12 DIAGNOSIS — R04.0 EPISTAXIS: Primary | ICD-10-CM

## 2023-07-12 PROCEDURE — 99214 OFFICE O/P EST MOD 30 MIN: CPT | Performed by: OTOLARYNGOLOGY

## 2023-07-12 NOTE — PROGRESS NOTES
Toño Lopez is a 36year old female. No chief complaint on file. HPI:   50-year-old white female had a traumatic injury to her nose at work for [phone] we have been seeing her she had a deviated septum on CT scan no other facial trauma has not was having spontaneous nosebleed right side treated her with with ointments seems to have improved up until 1 day ago had her first nosebleed in over a month. She attributed it did to heavy lifting at work  Current Outpatient Medications   Medication Sig Dispense Refill    lidocaine 5 % External Patch Place 1 patch onto the skin daily. 30 patch 0    cefuroxime 250 MG Oral Tab Take 1 tablet (250 mg total) by mouth 2 (two) times daily. 20 tablet 0    fluticasone propionate 50 MCG/ACT Nasal Suspension 2 sprays by Nasal route daily. 16 g 3    predniSONE 50 MG Oral Tab Take 1 tablet (50 mg total) by mouth daily. (Patient not taking: Reported on 4/10/2023) 5 tablet 0    diphenhydrAMINE 25 MG Oral Cap Take 2 capsules (50 mg total) by mouth every 6 (six) hours as needed for Itching. (Patient not taking: Reported on 4/10/2023) 30 capsule 0    traZODone 50 MG Oral Tab Take 50 mg by mouth nightly as needed for Sleep. (Patient not taking: Reported on 4/10/2023)      lamoTRIgine 25 MG Oral Tab Take 25 mg by mouth daily. (Patient not taking: Reported on 4/10/2023)      methylPREDNISolone 4 MG Oral Tablet Therapy Pack Dosepack: use as directed on packaging (Patient not taking: Reported on 4/10/2023) 1 each 0    Dicyclomine HCl 10 MG Oral Cap Take 1 capsule (10 mg total) by mouth 3 (three) times daily as needed (diarrhea/cramping). (Patient not taking: Reported on 9/23/2020) 90 capsule 1    Pantoprazole Sodium 40 MG Oral Tab EC Take 1 tablet (40 mg total) by mouth 2 (two) times daily before meals. (Patient not taking: Reported on 9/23/2020) 60 tablet 1    Lithium Carbonate 300 MG Oral Cap Take 300 mg by mouth 2 (two) times daily with meals.  (Patient not taking: Reported on 4/10/2023) Past Medical History:   Diagnosis Date    Bipolar affective ([address]     Diverticulitis     Esophageal reflux     Kidney stones       Social History:  Social History     Socioeconomic History    Marital status:    Tobacco Use    Smoking status: Former     Packs/day: 0.50     Years: 10.00     Pack years: 5.00     Types: Cigarettes     Quit date: 2001     Years since quittin.6     Passive exposure: Past    Smokeless tobacco: Former     Quit date: 1994   Vaping Use    Vaping Use: Former   Substance and Sexual Activity    Alcohol use:  Yes     Alcohol/week: 0.0 standard drinks of alcohol     Comment: glass of wine once per month    Drug use: Yes     Types: Cannabis   Other Topics Concern    Caffeine Concern No    Exercise Yes     Comment: work      Past Surgical History:   Procedure Laterality Date     [facility]         &     COLONOSCOPY  2017    COLONOSCOPY N/A 2020    Procedure: COLONOSCOPY;  Surgeon: Sal Higgins MD;  Location: [facility] ENDOSCOPY    D & C      LITHOTRIPSY  2008    nephrostomy tube removed    OTHER      benign bilateral breast biopsy    OTHER  2007    Nephrostomy tube insertion left kidney-removed     OTHER SURGICAL HISTORY  2007    nephroscopy with tube insertion    OTHER SURGICAL HISTORY  2008    kidney tube insertion removed    OTHER SURGICAL HISTORY  2008    lithotripsy    OTHER SURGICAL HISTORY      ashlie breast bxs \"neg\"    OTHER SURGICAL HISTORY Left 2015    Patellofemoral ligament reconstruction and tibial tubercle transfer    [address] [facility]  2014    cervical spine discectomy         REVIEW OF SYSTEMS:   GENERAL HEALTH: feels well otherwise  GENERAL : denies fever, chills, sweats, weight loss, weight gain  SKIN: denies any unusual skin lesions or rashes  RESPIRATORY: denies shortness of breath with exertion  NEURO: denies headaches    EXAM:   LMP 2023 (Approximate)     System Pertinent findings Details   Constitutional  Overall appearance - Normal.   Head/Face  Facial features -- Normal. Skull - Normal.   Eyes  Pupils equal ,round ,react to light and accomidate   Ears  External Ear Right: Normal, Left: Normal. Canal - Right: Normal, Left: Normal. TM - Right: Normal left: Normal   Nose  External Nose, Normal, Septum -deviated to the right there may be a bleeding site on the right no polyps or masses,Nasal Vault, clear. Turbinates - Right: Normal left: Normal   Mouth/Throat  Lips/teeth/gums - Normal. Tonsils -1 plus oropharynx - Normal.   Neck Exam  Inspection - Normal. Palpation - Normal. Parotid gland - Normal. Thyroid gland -normal   Lymph Detail  Submental. Submandibular. Anterior cervical. Posterior cervical. Supraclavicular. ASSESSMENT AND PLAN:   1. Epistaxis  Continue conservative management  An additional 1 month of 20 pound weight restriction for working. Continued bleeding we will consider cauterizing the right nose otherwise follow-up as needed      The patient indicates understanding of these issues and agrees to the plan.       Lina Qiu MD  7/12/2023  5:28 PM

## 2023-08-26 ENCOUNTER — HOSPITAL ENCOUNTER (OUTPATIENT)
Dept: CT IMAGING | Facility: HOSPITAL | Age: 43
Discharge: HOME | End: 2023-08-26
Attending: OTOLARYNGOLOGY
Payer: OTHER MISCELLANEOUS

## 2023-08-26 DIAGNOSIS — J32.0 CHRONIC MAXILLARY SINUSITIS: ICD-10-CM

## 2023-08-26 PROCEDURE — 70486 CT MAXILLOFACIAL W/O DYE: CPT | Performed by: OTOLARYNGOLOGY

## 2023-08-28 ENCOUNTER — TELEPHONE (OUTPATIENT)
Facility: LOCATION | Age: 43
End: 2023-08-28

## 2023-09-12 ENCOUNTER — OFFICE VISIT (OUTPATIENT)
Facility: LOCATION | Age: 43
End: 2023-09-12
Payer: OTHER MISCELLANEOUS

## 2023-09-12 DIAGNOSIS — J34.2 DEVIATED NASAL SEPTUM: Primary | ICD-10-CM

## 2023-09-14 DIAGNOSIS — J34.3 HYPERTROPHY OF NASAL TURBINATES: ICD-10-CM

## 2023-09-14 DIAGNOSIS — J34.2 DEVIATED NASAL SEPTUM: Primary | ICD-10-CM

## 2023-09-26 DIAGNOSIS — J34.3 HYPERTROPHY OF NASAL TURBINATES: Primary | ICD-10-CM

## 2023-09-27 ENCOUNTER — TELEPHONE (OUTPATIENT)
Facility: LOCATION | Age: 43
End: 2023-09-27

## 2023-10-01 ENCOUNTER — ANESTHESIA EVENT (OUTPATIENT)
Dept: SURGERY | Facility: HOSPITAL | Age: 43
End: 2023-10-01
Payer: COMMERCIAL

## 2023-10-02 ENCOUNTER — HOSPITAL ENCOUNTER (OUTPATIENT)
Facility: HOSPITAL | Age: 43
Setting detail: HOSPITAL OUTPATIENT SURGERY
Discharge: HOME | End: 2023-10-02
Attending: OTOLARYNGOLOGY | Admitting: OTOLARYNGOLOGY
Payer: COMMERCIAL

## 2023-10-02 ENCOUNTER — ANESTHESIA (OUTPATIENT)
Dept: SURGERY | Facility: HOSPITAL | Age: 43
End: 2023-10-02
Payer: COMMERCIAL

## 2023-10-02 VITALS
HEIGHT: 61 IN | SYSTOLIC BLOOD PRESSURE: 126 MMHG | OXYGEN SATURATION: 98 % | DIASTOLIC BLOOD PRESSURE: 80 MMHG | WEIGHT: 123 LBS | TEMPERATURE: 99 F | HEART RATE: 62 BPM | BODY MASS INDEX: 23.22 KG/M2 | RESPIRATION RATE: 14 BRPM

## 2023-10-02 DIAGNOSIS — J34.3 HYPERTROPHY OF NASAL TURBINATES: ICD-10-CM

## 2023-10-02 DIAGNOSIS — J34.2 DEVIATED NASAL SEPTUM: ICD-10-CM

## 2023-10-02 LAB — B-HCG UR QL: NEGATIVE

## 2023-10-02 PROCEDURE — 095L7ZZ DESTRUCTION OF NASAL TURBINATE, VIA NATURAL OR ARTIFICIAL OPENING: ICD-10-PCS | Performed by: OTOLARYNGOLOGY

## 2023-10-02 PROCEDURE — 81025 URINE PREGNANCY TEST: CPT

## 2023-10-02 PROCEDURE — 09SM0ZZ REPOSITION NASAL SEPTUM, OPEN APPROACH: ICD-10-PCS | Performed by: OTOLARYNGOLOGY

## 2023-10-02 PROCEDURE — 88300 SURGICAL PATH GROSS: CPT | Performed by: OTOLARYNGOLOGY

## 2023-10-02 RX ORDER — HYDROMORPHONE HYDROCHLORIDE 1 MG/ML
0.2 INJECTION, SOLUTION INTRAMUSCULAR; INTRAVENOUS; SUBCUTANEOUS EVERY 5 MIN PRN
Status: DISCONTINUED | OUTPATIENT
Start: 2023-10-02 | End: 2023-10-02

## 2023-10-02 RX ORDER — DEXAMETHASONE SODIUM PHOSPHATE 4 MG/ML
VIAL (ML) INJECTION AS NEEDED
Status: DISCONTINUED | OUTPATIENT
Start: 2023-10-02 | End: 2023-10-02 | Stop reason: SURG

## 2023-10-02 RX ORDER — HYDROMORPHONE HYDROCHLORIDE 1 MG/ML
INJECTION, SOLUTION INTRAMUSCULAR; INTRAVENOUS; SUBCUTANEOUS
Status: COMPLETED
Start: 2023-10-02 | End: 2023-10-02

## 2023-10-02 RX ORDER — AZITHROMYCIN 250 MG/1
TABLET, FILM COATED ORAL
Qty: 6 TABLET | Refills: 0 | Status: SHIPPED | OUTPATIENT
Start: 2023-10-02

## 2023-10-02 RX ORDER — HYDROCODONE BITARTRATE AND ACETAMINOPHEN 5; 325 MG/1; MG/1
1 TABLET ORAL EVERY 6 HOURS PRN
Qty: 20 TABLET | Refills: 0 | Status: SHIPPED | OUTPATIENT
Start: 2023-10-02

## 2023-10-02 RX ORDER — LABETALOL HYDROCHLORIDE 5 MG/ML
5 INJECTION, SOLUTION INTRAVENOUS EVERY 5 MIN PRN
Status: DISCONTINUED | OUTPATIENT
Start: 2023-10-02 | End: 2023-10-02

## 2023-10-02 RX ORDER — MEPERIDINE HYDROCHLORIDE 25 MG/ML
12.5 INJECTION INTRAMUSCULAR; INTRAVENOUS; SUBCUTANEOUS AS NEEDED
Status: DISCONTINUED | OUTPATIENT
Start: 2023-10-02 | End: 2023-10-02

## 2023-10-02 RX ORDER — HYDROMORPHONE HYDROCHLORIDE 1 MG/ML
0.4 INJECTION, SOLUTION INTRAMUSCULAR; INTRAVENOUS; SUBCUTANEOUS EVERY 5 MIN PRN
Status: DISCONTINUED | OUTPATIENT
Start: 2023-10-02 | End: 2023-10-02

## 2023-10-02 RX ORDER — LIDOCAINE HYDROCHLORIDE AND EPINEPHRINE 10; 10 MG/ML; UG/ML
INJECTION, SOLUTION INFILTRATION; PERINEURAL AS NEEDED
Status: DISCONTINUED | OUTPATIENT
Start: 2023-10-02 | End: 2023-10-02 | Stop reason: HOSPADM

## 2023-10-02 RX ORDER — PROCHLORPERAZINE EDISYLATE 5 MG/ML
5 INJECTION INTRAMUSCULAR; INTRAVENOUS EVERY 8 HOURS PRN
Status: DISCONTINUED | OUTPATIENT
Start: 2023-10-02 | End: 2023-10-02

## 2023-10-02 RX ORDER — HYDROCODONE BITARTRATE AND ACETAMINOPHEN 10; 325 MG/1; MG/1
1 TABLET ORAL ONCE AS NEEDED
Status: COMPLETED | OUTPATIENT
Start: 2023-10-02 | End: 2023-10-02

## 2023-10-02 RX ORDER — ROCURONIUM BROMIDE 10 MG/ML
INJECTION, SOLUTION INTRAVENOUS AS NEEDED
Status: DISCONTINUED | OUTPATIENT
Start: 2023-10-02 | End: 2023-10-02 | Stop reason: SURG

## 2023-10-02 RX ORDER — HYDROMORPHONE HYDROCHLORIDE 1 MG/ML
0.6 INJECTION, SOLUTION INTRAMUSCULAR; INTRAVENOUS; SUBCUTANEOUS EVERY 5 MIN PRN
Status: DISCONTINUED | OUTPATIENT
Start: 2023-10-02 | End: 2023-10-02

## 2023-10-02 RX ORDER — MIDAZOLAM HYDROCHLORIDE 1 MG/ML
1 INJECTION INTRAMUSCULAR; INTRAVENOUS EVERY 5 MIN PRN
Status: DISCONTINUED | OUTPATIENT
Start: 2023-10-02 | End: 2023-10-02

## 2023-10-02 RX ORDER — HYDROCODONE BITARTRATE AND ACETAMINOPHEN 10; 325 MG/1; MG/1
2 TABLET ORAL ONCE AS NEEDED
Status: COMPLETED | OUTPATIENT
Start: 2023-10-02 | End: 2023-10-02

## 2023-10-02 RX ORDER — SODIUM CHLORIDE, SODIUM LACTATE, POTASSIUM CHLORIDE, CALCIUM CHLORIDE 600; 310; 30; 20 MG/100ML; MG/100ML; MG/100ML; MG/100ML
INJECTION, SOLUTION INTRAVENOUS CONTINUOUS
Status: DISCONTINUED | OUTPATIENT
Start: 2023-10-02 | End: 2023-10-02

## 2023-10-02 RX ORDER — ONDANSETRON 2 MG/ML
INJECTION INTRAMUSCULAR; INTRAVENOUS AS NEEDED
Status: DISCONTINUED | OUTPATIENT
Start: 2023-10-02 | End: 2023-10-02 | Stop reason: SURG

## 2023-10-02 RX ORDER — SCOLOPAMINE TRANSDERMAL SYSTEM 1 MG/1
1 PATCH, EXTENDED RELEASE TRANSDERMAL ONCE
Status: DISCONTINUED | OUTPATIENT
Start: 2023-10-02 | End: 2023-10-02 | Stop reason: HOSPADM

## 2023-10-02 RX ORDER — LIDOCAINE HYDROCHLORIDE 10 MG/ML
INJECTION, SOLUTION EPIDURAL; INFILTRATION; INTRACAUDAL; PERINEURAL AS NEEDED
Status: DISCONTINUED | OUTPATIENT
Start: 2023-10-02 | End: 2023-10-02 | Stop reason: SURG

## 2023-10-02 RX ORDER — ONDANSETRON 2 MG/ML
4 INJECTION INTRAMUSCULAR; INTRAVENOUS EVERY 6 HOURS PRN
Status: DISCONTINUED | OUTPATIENT
Start: 2023-10-02 | End: 2023-10-02

## 2023-10-02 RX ORDER — ACETAMINOPHEN 500 MG
1000 TABLET ORAL ONCE
Status: DISCONTINUED | OUTPATIENT
Start: 2023-10-02 | End: 2023-10-02 | Stop reason: HOSPADM

## 2023-10-02 RX ORDER — NALOXONE HYDROCHLORIDE 0.4 MG/ML
80 INJECTION, SOLUTION INTRAMUSCULAR; INTRAVENOUS; SUBCUTANEOUS AS NEEDED
Status: DISCONTINUED | OUTPATIENT
Start: 2023-10-02 | End: 2023-10-02

## 2023-10-02 RX ORDER — ACETAMINOPHEN 500 MG
1000 TABLET ORAL ONCE AS NEEDED
Status: COMPLETED | OUTPATIENT
Start: 2023-10-02 | End: 2023-10-02

## 2023-10-02 RX ADMIN — ROCURONIUM BROMIDE 30 MG: 10 INJECTION, SOLUTION INTRAVENOUS at 14:09:00

## 2023-10-02 RX ADMIN — ONDANSETRON 4 MG: 2 INJECTION INTRAMUSCULAR; INTRAVENOUS at 14:36:00

## 2023-10-02 RX ADMIN — DEXAMETHASONE SODIUM PHOSPHATE 8 MG: 4 MG/ML VIAL (ML) INJECTION at 14:09:00

## 2023-10-02 RX ADMIN — SODIUM CHLORIDE, SODIUM LACTATE, POTASSIUM CHLORIDE, CALCIUM CHLORIDE: 600; 310; 30; 20 INJECTION, SOLUTION INTRAVENOUS at 14:48:00

## 2023-10-02 RX ADMIN — LIDOCAINE HYDROCHLORIDE 50 MG: 10 INJECTION, SOLUTION EPIDURAL; INFILTRATION; INTRACAUDAL; PERINEURAL at 14:09:00

## 2023-10-02 NOTE — OPERATIVE REPORT
[facility]  Op Note    Dmitriy Corona Location: OR   [facility] [id number] MRN [id number]   Admission Date 10/2/2023 Operation Date 10/2/2023   Attending Physician Claire Guerrero MD Operating Physician Pernell Anderson MD     Pre-Operative Diagnosis: Deviated nasal septum [J34.2]  Hypertrophy of nasal turbinates [J34.3]    Post-Operative Diagnosis: Same as above    Procedure Performed: Procedure(s):  Nasal Septoplasty; Out Fracture of bilateral Inferior Turbinates; Coblation Submucous Resection of bilateral Inferior Turbinates, Steroid Injection of bilateral Turbinates    Surgeon: Surgeon(s):  Claire Guerrero MD     Anesthesia: General        Summary of Case: The patient was brought into the operating room and placed in the supine position. Patient was brought down to a satisfactory level of anesthesia intubated prepped and draped. We used 4% cocaine solution soaked on neurosurgical pledgets and anesthetize the nose topically followed by 1% Xylocaine with 1 100,000 epinephrine with typical injections for septum . At this time then the nasal septum was performed via a left hemitransfixion incision. A mucoperichondrial and mucoperiosteal flap was elevated. At the attachment of the quadrangular cartilage to the perpendicular plate of the ethmoid bone an intercartilaginous incision was then made. Elevation on the opposite side was performed as well. Portions of the perpendicular plate ethmoid bone and vomer bone were removed. This seemed to adequately alleviate deviation of the nasal septum. Hemitransfixion incisions was closed with 4-0 plain suture interrupted fashion. Inferior turbinates were outfractured laterally with an osteotome. We used the Coblation device to reduce the size of the turbinates in the usual fashion and settings. Septal splints were applied and placed on either side of the septum secured with a 2-0 silk suture. .  Septal deviation to the left repaired.   And nasal tampon packings were placed. Tolerated the procedure well 2 cc blood loss no complications.     Rod Eli MD  10/2/2023  2:42 PM

## 2023-10-02 NOTE — INTERVAL H&P NOTE
The above referenced H&P was reviewed by Eli Milan MD on 10/2/2023, the patient was examined and no significant changes have occurred in the patient's condition since the H&P was performed. Risks and benefits were discussed, proceed with procedure as planned. Pre-op Diagnosis: Deviated nasal septum [J34.2]  Hypertrophy of nasal turbinates [J34.3]    The above referenced H&P was reviewed by Eli Milan MD on 10/2/2023, the patient was examined and no significant changes have occurred in the patient's condition since the H&P was performed. I discussed with the patient and/or legal representative the potential benefits, risks and side effects of this procedure; the likelihood of the patient achieving goals; and potential problems that might occur during recuperation. I discussed reasonable alternatives to the procedure, including risks, benefits and side effects related to the alternatives and risks related to not receiving this procedure. We will proceed with procedure as planned.

## 2023-10-02 NOTE — ANESTHESIA PROCEDURE NOTES
Airway  Date/Time: 10/2/2023 2:12 PM  Urgency: elective      General Information and Staff    Patient location during procedure: OR  Anesthesiologist: Ever Villalba MD  Performed: anesthesiologist   Performed by: Ever Villalba MD  Authorized by: Ever Villalba MD      Indications and Patient Condition  Indications for airway management: anesthesia  Sedation level: deep  Preoxygenated: yes  Patient position: sniffing  Mask difficulty assessment: 1 - vent by mask    Final Airway Details  Final airway type: endotracheal airway      Successful airway: ETT  Cuffed: yes   Successful intubation technique: direct laryngoscopy  Endotracheal tube insertion site: oral  Blade: Marko  Blade size: #4  ETT size (mm): 7.0    Cormack-Lehane Classification: grade I - full view of glottis  Placement verified by: capnometry   Measured from: lips  ETT to lips (cm): 22  Number of attempts at approach: 1

## 2023-10-02 NOTE — BRIEF OP NOTE
Pre-Operative Diagnosis: Deviated nasal septum [J34.2]  Hypertrophy of nasal turbinates [J34.3]     Post-Operative Diagnosis: Deviated nasal septum [B04. 2]Hypertrophy of nasal turbinates [J34.3]      Procedure Performed:   Nasal Septoplasty;  Out Fracture of bilateral Inferior Turbinates; Coblation Submucous Resection of bilateral Inferior Turbinates, Steroid Injection of bilateral Turbinates    Surgeon(s) and Role:     * Wesley Schultz MD - Primary    Assistant(s):        Surgical Findings: Deviated nasal septum to the left and hypertrophic turbinates     Specimen: Septal cartilage and bone     Estimated Blood Loss: Blood Output: 2 mL (10/2/2023  2:33 PM)      Dictation Number: Tete Brady MD  10/2/2023  2:42 PM

## 2023-10-03 ENCOUNTER — OFFICE VISIT (OUTPATIENT)
Facility: LOCATION | Age: 43
End: 2023-10-03
Payer: OTHER MISCELLANEOUS

## 2023-10-03 DIAGNOSIS — J34.2 DEVIATED NASAL SEPTUM: Primary | ICD-10-CM

## 2023-10-03 NOTE — PROGRESS NOTES
Nasal packing removed from repair nasal septum 10-23.   Minimal bleeding postop instructions given follow-up for Friday for splint removal.

## 2023-10-05 ENCOUNTER — HOSPITAL ENCOUNTER (EMERGENCY)
Facility: HOSPITAL | Age: 43
Discharge: HOME | End: 2023-10-05
Attending: EMERGENCY MEDICINE

## 2023-10-05 ENCOUNTER — APPOINTMENT (OUTPATIENT)
Dept: ULTRASOUND IMAGING | Facility: HOSPITAL | Age: 43
End: 2023-10-05
Attending: EMERGENCY MEDICINE

## 2023-10-05 ENCOUNTER — TELEPHONE (OUTPATIENT)
Facility: LOCATION | Age: 43
End: 2023-10-05

## 2023-10-05 VITALS
DIASTOLIC BLOOD PRESSURE: 72 MMHG | RESPIRATION RATE: 16 BRPM | HEART RATE: 68 BPM | WEIGHT: 122 LBS | BODY MASS INDEX: 23.03 KG/M2 | HEIGHT: 61 IN | OXYGEN SATURATION: 99 % | TEMPERATURE: 97 F | SYSTOLIC BLOOD PRESSURE: 106 MMHG

## 2023-10-05 DIAGNOSIS — I80.8 SUPERFICIAL THROMBOPHLEBITIS OF LEFT UPPER EXTREMITY: Primary | ICD-10-CM

## 2023-10-05 PROCEDURE — 99285 EMERGENCY DEPT VISIT HI MDM: CPT

## 2023-10-05 PROCEDURE — 99284 EMERGENCY DEPT VISIT MOD MDM: CPT

## 2023-10-05 PROCEDURE — 93971 EXTREMITY STUDY: CPT | Performed by: EMERGENCY MEDICINE

## 2023-10-05 RX ORDER — HYDROCODONE BITARTRATE AND ACETAMINOPHEN 5; 325 MG/1; MG/1
1 TABLET ORAL ONCE
Status: COMPLETED | OUTPATIENT
Start: 2023-10-05 | End: 2023-10-05

## 2023-10-05 RX ORDER — HYDROCODONE BITARTRATE AND ACETAMINOPHEN 5; 325 MG/1; MG/1
1 TABLET ORAL EVERY 4 HOURS PRN
Qty: 10 TABLET | Refills: 0 | Status: SHIPPED | OUTPATIENT
Start: 2023-10-05 | End: 2023-10-07

## 2023-10-05 NOTE — TELEPHONE ENCOUNTER
Pt called stating she feels like she has a blood clot in her arm and wasn't sure if she should go to the ER. States her arm is in pain. I instructed her to go to the ER.  Pt gave verbal understanding

## 2023-10-05 NOTE — DISCHARGE INSTRUCTIONS
Take 1 aspirin daily for pain you can use Tylenol or ibuprofen. For more severe pain take Norco.  Use a heating pad or warm compresses 20 minutes 4 times a day if your symptoms are worsening develop fever or extensive redness or lump or swelling you should have this rechecked or come back to the ER.

## 2023-10-05 NOTE — ED INITIAL ASSESSMENT (HPI)
PT states that had a procedure to correct her deviated septum on Monday ( two days ago)and she began to experiencing pain on her IV site (left forearm) after the procedure was done. PT states that the area is very tender to touch , swollen and that she is experiencing severe pain.

## 2023-10-06 ENCOUNTER — OFFICE VISIT (OUTPATIENT)
Facility: LOCATION | Age: 43
End: 2023-10-06
Payer: COMMERCIAL

## 2023-10-06 DIAGNOSIS — J34.2 DEVIATED NASAL SEPTUM: Primary | ICD-10-CM

## 2023-10-09 ENCOUNTER — TELEPHONE (OUTPATIENT)
Facility: LOCATION | Age: 43
End: 2023-10-09

## 2023-10-13 ENCOUNTER — TELEPHONE (OUTPATIENT)
Dept: SURGERY | Facility: HOSPITAL | Age: 43
End: 2023-10-13

## 2023-10-13 NOTE — TELEPHONE ENCOUNTER
ASSESSMENT AND PLAN:   Shana Garcia is a 39year old female with epigastric abdominal pain. Symptoms for 2 years. Pain is sharp at times related to meals and does not radiate. Has > 100 lb weight loss. Labs ok. EGD colon CT WNL. Gallstones noted. Need to rule out biliary etiology or functional etiology (IBS). Need to proceed with biliary imaging. 1  Abd US and PIPIDA scan with CCK. 2. Bentyl 20 BID. 3.  If above abn refer to surgery.

## 2023-10-14 ENCOUNTER — PATIENT MESSAGE (OUTPATIENT)
Facility: LOCATION | Age: 43
End: 2023-10-14

## 2023-10-16 NOTE — TELEPHONE ENCOUNTER
From: Mark Amaya  To: Jeff Frank  Sent: 10/14/2023 12:05 PM CDT  Subject: Note    Sorry to bother you. My left nostril is still hard to breathe out of. It is getting better but I was wanting my return to work push a day? Supposed to go tonight. Can I go tomorrow please? Just wanna be able to breathe right inside [employer].  Thank you so much

## 2023-10-18 ENCOUNTER — OFFICE VISIT (OUTPATIENT)
Facility: LOCATION | Age: 43
End: 2023-10-18
Payer: COMMERCIAL

## 2023-10-18 DIAGNOSIS — J34.2 DEVIATED NASAL SEPTUM: Primary | ICD-10-CM

## 2023-10-18 PROCEDURE — 99024 POSTOP FOLLOW-UP VISIT: CPT | Performed by: OTOLARYNGOLOGY

## 2023-10-18 NOTE — PROGRESS NOTES
Patient status post nasal septoplasty 10-2-23 she also had turbinectomy at that time she has been having crusting that is blocked her nose she is unable to irrigate here for evaluation. There was crusting on the tip turbinates bilaterally which I removed via debridement type procedure. Once this was done she had perfect airway of the septum is midline there is no septal perforation. She is instructed for the use of saline nasal spray and then Flonase at night and follow-up as needed.

## 2023-10-23 ENCOUNTER — NURSE TRIAGE (OUTPATIENT)
Dept: TELEHEALTH | Age: 43
End: 2023-10-23

## 2023-11-02 PROBLEM — T78.40XA ALLERGIC REACTION: Status: ACTIVE | Noted: 2023-09-23

## 2023-11-02 PROBLEM — K57.30 DIVERTICULOSIS OF LARGE INTESTINE WITHOUT HEMORRHAGE: Status: ACTIVE | Noted: 2019-01-23

## 2023-11-03 PROBLEM — F41.9 ANXIETY: Status: ACTIVE | Noted: 2023-11-03

## 2023-11-07 ENCOUNTER — OFFICE VISIT (OUTPATIENT)
Facility: LOCATION | Age: 43
End: 2023-11-07
Payer: OTHER MISCELLANEOUS

## 2023-11-07 DIAGNOSIS — J34.2 DEVIATED NASAL SEPTUM: Primary | ICD-10-CM

## 2023-11-07 NOTE — PROGRESS NOTES
Patient had nasal septoplasty October 2, 2023 along with turbinectomy has been having crusting which is improving with irrigations she came in for debridement.   I debrided the nose the crusting is much less I have encouraged her to continue saline rinses everything else looks fantastic midline septum without perforations good airway noted

## 2023-11-08 ENCOUNTER — TELEPHONE (OUTPATIENT)
Dept: SURGERY | Facility: HOSPITAL | Age: 43
End: 2023-11-08

## 2023-11-08 NOTE — TELEPHONE ENCOUNTER
Ultrasound unremarkable. Kidney \"fullmess\" likely not significant but would follow up with PCP if needed. Await for Gallbladder scan.      Sent to [address]

## 2024-01-17 ENCOUNTER — HOSPITAL ENCOUNTER (OUTPATIENT)
Facility: HOSPITAL | Age: 44
Setting detail: OBSERVATION
Discharge: HOME | End: 2024-01-18
Attending: EMERGENCY MEDICINE | Admitting: INTERNAL MEDICINE
Payer: MEDICAID

## 2024-01-17 ENCOUNTER — APPOINTMENT (OUTPATIENT)
Dept: NUCLEAR MEDICINE | Facility: HOSPITAL | Age: 44
End: 2024-01-17
Attending: EMERGENCY MEDICINE
Payer: MEDICAID

## 2024-01-17 ENCOUNTER — APPOINTMENT (OUTPATIENT)
Dept: CT IMAGING | Facility: HOSPITAL | Age: 44
End: 2024-01-17
Attending: EMERGENCY MEDICINE
Payer: MEDICAID

## 2024-01-17 DIAGNOSIS — R10.13 ABDOMINAL PAIN, EPIGASTRIC: Primary | ICD-10-CM

## 2024-01-17 LAB
ALBUMIN SERPL-MCNC: 3.5 G/DL (ref 3.4–5)
ALBUMIN/GLOB SERPL: 1 {RATIO} (ref 1–2)
ALP LIVER SERPL-CCNC: 60 U/L
ALT SERPL-CCNC: 18 U/L
ANION GAP SERPL CALC-SCNC: 6 MMOL/L (ref 0–18)
AST SERPL-CCNC: 12 U/L (ref 15–37)
B-HCG SERPL-ACNC: <1 MIU/ML
B-HCG UR QL: NEGATIVE
BASOPHILS # BLD AUTO: 0.05 X10(3) UL (ref 0–0.2)
BASOPHILS NFR BLD AUTO: 0.5 %
BILIRUB SERPL-MCNC: 0.3 MG/DL (ref 0.1–2)
BILIRUB UR QL STRIP.AUTO: NEGATIVE
BUN BLD-MCNC: 9 MG/DL (ref 9–23)
CALCIUM BLD-MCNC: 9.2 MG/DL (ref 8.5–10.1)
CHLORIDE SERPL-SCNC: 108 MMOL/L (ref 98–112)
CO2 SERPL-SCNC: 25 MMOL/L (ref 21–32)
CREAT BLD-MCNC: 0.62 MG/DL
D DIMER PPP FEU-MCNC: 0.79 UG/ML FEU (ref ?–0.5)
EGFRCR SERPLBLD CKD-EPI 2021: 113 ML/MIN/1.73M2 (ref 60–?)
EOSINOPHIL # BLD AUTO: 0.22 X10(3) UL (ref 0–0.7)
EOSINOPHIL NFR BLD AUTO: 2.2 %
ERYTHROCYTE [DISTWIDTH] IN BLOOD BY AUTOMATED COUNT: 11.9 %
GLOBULIN PLAS-MCNC: 3.6 G/DL (ref 2.8–4.4)
GLUCOSE BLD-MCNC: 79 MG/DL (ref 70–99)
GLUCOSE UR STRIP.AUTO-MCNC: NORMAL MG/DL
HCT VFR BLD AUTO: 41.8 %
HGB BLD-MCNC: 14.1 G/DL
IMM GRANULOCYTES # BLD AUTO: 0.03 X10(3) UL (ref 0–1)
IMM GRANULOCYTES NFR BLD: 0.3 %
KETONES UR STRIP.AUTO-MCNC: NEGATIVE MG/DL
LEUKOCYTE ESTERASE UR QL STRIP.AUTO: NEGATIVE
LIPASE SERPL-CCNC: 28 U/L (ref 13–75)
LYMPHOCYTES # BLD AUTO: 1.91 X10(3) UL (ref 1–4)
LYMPHOCYTES NFR BLD AUTO: 18.9 %
MCH RBC QN AUTO: 30.8 PG (ref 26–34)
MCHC RBC AUTO-ENTMCNC: 33.7 G/DL (ref 31–37)
MCV RBC AUTO: 91.3 FL
MONOCYTES # BLD AUTO: 0.54 X10(3) UL (ref 0.1–1)
MONOCYTES NFR BLD AUTO: 5.4 %
NEUTROPHILS # BLD AUTO: 7.33 X10 (3) UL (ref 1.5–7.7)
NEUTROPHILS # BLD AUTO: 7.33 X10(3) UL (ref 1.5–7.7)
NEUTROPHILS NFR BLD AUTO: 72.7 %
NITRITE UR QL STRIP.AUTO: NEGATIVE
OSMOLALITY SERPL CALC.SUM OF ELEC: 286 MOSM/KG (ref 275–295)
PH UR STRIP.AUTO: 7.5 [PH] (ref 5–8)
PLATELET # BLD AUTO: 329 10(3)UL (ref 150–450)
POTASSIUM SERPL-SCNC: 4.1 MMOL/L (ref 3.5–5.1)
PROT SERPL-MCNC: 7.1 G/DL (ref 6.4–8.2)
PROT UR STRIP.AUTO-MCNC: NEGATIVE MG/DL
RBC # BLD AUTO: 4.58 X10(6)UL
RBC UR QL AUTO: NEGATIVE
SODIUM SERPL-SCNC: 139 MMOL/L (ref 136–145)
SP GR UR STRIP.AUTO: 1.01 (ref 1–1.03)
UROBILINOGEN UR STRIP.AUTO-MCNC: NORMAL MG/DL
WBC # BLD AUTO: 10.1 X10(3) UL (ref 4–11)
YEAST UR QL: PRESENT /HPF

## 2024-01-17 PROCEDURE — 74177 CT ABD & PELVIS W/CONTRAST: CPT | Performed by: EMERGENCY MEDICINE

## 2024-01-17 PROCEDURE — 99222 1ST HOSP IP/OBS MODERATE 55: CPT | Performed by: INTERNAL MEDICINE

## 2024-01-17 PROCEDURE — 71275 CT ANGIOGRAPHY CHEST: CPT | Performed by: EMERGENCY MEDICINE

## 2024-01-17 PROCEDURE — 78830 RP LOCLZJ TUM SPECT W/CT 1: CPT | Performed by: EMERGENCY MEDICINE

## 2024-01-17 PROCEDURE — 78226 HEPATOBILIARY SYSTEM IMAGING: CPT | Performed by: EMERGENCY MEDICINE

## 2024-01-17 RX ORDER — MORPHINE SULFATE 4 MG/ML
4 INJECTION, SOLUTION INTRAMUSCULAR; INTRAVENOUS ONCE
Status: COMPLETED | OUTPATIENT
Start: 2024-01-17 | End: 2024-01-17

## 2024-01-17 RX ORDER — KETOROLAC TROMETHAMINE 15 MG/ML
15 INJECTION, SOLUTION INTRAMUSCULAR; INTRAVENOUS ONCE
Status: COMPLETED | OUTPATIENT
Start: 2024-01-17 | End: 2024-01-17

## 2024-01-17 RX ORDER — ONDANSETRON 2 MG/ML
4 INJECTION INTRAMUSCULAR; INTRAVENOUS ONCE
Status: COMPLETED | OUTPATIENT
Start: 2024-01-17 | End: 2024-01-17

## 2024-01-17 NOTE — ED QUICK NOTES
Rounding Completed    Plan of Care reviewed. Waiting for CT result.  Elimination needs assessed.  Provided comfort measures.    Bed is locked and in lowest position. Call light within reach.

## 2024-01-17 NOTE — ED INITIAL ASSESSMENT (HPI)
Patient reports with worsening abdominal pain after getting her gallbladder out on Friday.  Nausea, pain, vomiting.  Denies fevers    pain radiates up to right collar bone when severe   Pain even when drinking water or walking

## 2024-01-17 NOTE — ED QUICK NOTES
Rounding Completed    Plan of Care reviewed. Waiting for NM scan.  Elimination needs assessed.  Provided comfort measures.    Bed is locked and in lowest position. Call light within reach.

## 2024-01-17 NOTE — ED QUICK NOTES
Rounding Completed    Plan of Care reviewed. Waiting for CT scan.  Elimination needs assessed.  Provided comfort measures.    Bed is locked and in lowest position. Call light within reach.

## 2024-01-17 NOTE — ED QUICK NOTES
Rounding Completed    Plan of Care reviewed. Waiting for MD evaluation.  Elimination needs assessed.  Provided warm blanket.    Bed is locked and in lowest position. Call light within reach.

## 2024-01-18 VITALS
DIASTOLIC BLOOD PRESSURE: 66 MMHG | TEMPERATURE: 98 F | HEIGHT: 61 IN | WEIGHT: 124.63 LBS | RESPIRATION RATE: 17 BRPM | HEART RATE: 58 BPM | BODY MASS INDEX: 23.53 KG/M2 | OXYGEN SATURATION: 98 % | SYSTOLIC BLOOD PRESSURE: 107 MMHG

## 2024-01-18 PROBLEM — F31.9 BIPOLAR DISORDER (HCC): Status: ACTIVE | Noted: 2024-01-18

## 2024-01-18 LAB
ALBUMIN SERPL-MCNC: 3.1 G/DL (ref 3.4–5)
ALBUMIN/GLOB SERPL: 1.1 {RATIO} (ref 1–2)
ALP LIVER SERPL-CCNC: 54 U/L
ALT SERPL-CCNC: 14 U/L
ANION GAP SERPL CALC-SCNC: 2 MMOL/L (ref 0–18)
AST SERPL-CCNC: 13 U/L (ref 15–37)
BASOPHILS # BLD AUTO: 0.04 X10(3) UL (ref 0–0.2)
BASOPHILS NFR BLD AUTO: 0.4 %
BILIRUB SERPL-MCNC: 0.4 MG/DL (ref 0.1–2)
BUN BLD-MCNC: 12 MG/DL (ref 9–23)
CALCIUM BLD-MCNC: 8.4 MG/DL (ref 8.5–10.1)
CHLORIDE SERPL-SCNC: 112 MMOL/L (ref 98–112)
CO2 SERPL-SCNC: 26 MMOL/L (ref 21–32)
CREAT BLD-MCNC: 0.64 MG/DL
EGFRCR SERPLBLD CKD-EPI 2021: 112 ML/MIN/1.73M2 (ref 60–?)
EOSINOPHIL # BLD AUTO: 0.25 X10(3) UL (ref 0–0.7)
EOSINOPHIL NFR BLD AUTO: 2.6 %
ERYTHROCYTE [DISTWIDTH] IN BLOOD BY AUTOMATED COUNT: 11.9 %
GLOBULIN PLAS-MCNC: 2.9 G/DL (ref 2.8–4.4)
GLUCOSE BLD-MCNC: 80 MG/DL (ref 70–99)
HCT VFR BLD AUTO: 35.7 %
HGB BLD-MCNC: 12.2 G/DL
IMM GRANULOCYTES # BLD AUTO: 0.03 X10(3) UL (ref 0–1)
IMM GRANULOCYTES NFR BLD: 0.3 %
LYMPHOCYTES # BLD AUTO: 1.78 X10(3) UL (ref 1–4)
LYMPHOCYTES NFR BLD AUTO: 18.2 %
MAGNESIUM SERPL-MCNC: 2.2 MG/DL (ref 1.6–2.6)
MCH RBC QN AUTO: 30.8 PG (ref 26–34)
MCHC RBC AUTO-ENTMCNC: 34.2 G/DL (ref 31–37)
MCV RBC AUTO: 90.2 FL
MONOCYTES # BLD AUTO: 0.49 X10(3) UL (ref 0.1–1)
MONOCYTES NFR BLD AUTO: 5 %
NEUTROPHILS # BLD AUTO: 7.17 X10 (3) UL (ref 1.5–7.7)
NEUTROPHILS # BLD AUTO: 7.17 X10(3) UL (ref 1.5–7.7)
NEUTROPHILS NFR BLD AUTO: 73.5 %
OSMOLALITY SERPL CALC.SUM OF ELEC: 289 MOSM/KG (ref 275–295)
PHOSPHATE SERPL-MCNC: 3.7 MG/DL (ref 2.5–4.9)
PLATELET # BLD AUTO: 254 10(3)UL (ref 150–450)
POTASSIUM SERPL-SCNC: 4.6 MMOL/L (ref 3.5–5.1)
PROT SERPL-MCNC: 6 G/DL (ref 6.4–8.2)
RBC # BLD AUTO: 3.96 X10(6)UL
SODIUM SERPL-SCNC: 140 MMOL/L (ref 136–145)
WBC # BLD AUTO: 9.8 X10(3) UL (ref 4–11)

## 2024-01-18 PROCEDURE — 99239 HOSP IP/OBS DSCHRG MGMT >30: CPT | Performed by: INTERNAL MEDICINE

## 2024-01-18 RX ORDER — ACETAMINOPHEN 325 MG/1
650 TABLET ORAL EVERY 4 HOURS PRN
Status: DISCONTINUED | OUTPATIENT
Start: 2024-01-18 | End: 2024-01-18

## 2024-01-18 RX ORDER — KETOROLAC TROMETHAMINE 15 MG/ML
15 INJECTION, SOLUTION INTRAMUSCULAR; INTRAVENOUS EVERY 6 HOURS PRN
Status: DISCONTINUED | OUTPATIENT
Start: 2024-01-18 | End: 2024-01-18

## 2024-01-18 RX ORDER — SCOLOPAMINE TRANSDERMAL SYSTEM 1 MG/1
1 PATCH, EXTENDED RELEASE TRANSDERMAL
Status: DISCONTINUED | OUTPATIENT
Start: 2024-01-18 | End: 2024-01-18

## 2024-01-18 RX ORDER — ONDANSETRON 4 MG/1
4 TABLET, ORALLY DISINTEGRATING ORAL EVERY 4 HOURS PRN
Qty: 30 TABLET | Refills: 0 | Status: SHIPPED | OUTPATIENT
Start: 2024-01-18

## 2024-01-18 RX ORDER — SODIUM CHLORIDE 9 MG/ML
INJECTION, SOLUTION INTRAVENOUS CONTINUOUS
Status: ACTIVE | OUTPATIENT
Start: 2024-01-18 | End: 2024-01-18

## 2024-01-18 RX ORDER — HYDROCODONE BITARTRATE AND ACETAMINOPHEN 5; 325 MG/1; MG/1
2 TABLET ORAL EVERY 4 HOURS PRN
Status: DISCONTINUED | OUTPATIENT
Start: 2024-01-18 | End: 2024-01-18

## 2024-01-18 RX ORDER — ENOXAPARIN SODIUM 100 MG/ML
40 INJECTION SUBCUTANEOUS DAILY
Status: DISCONTINUED | OUTPATIENT
Start: 2024-01-18 | End: 2024-01-18

## 2024-01-18 RX ORDER — HYDROCODONE BITARTRATE AND ACETAMINOPHEN 5; 325 MG/1; MG/1
1 TABLET ORAL EVERY 6 HOURS PRN
Status: DISCONTINUED | OUTPATIENT
Start: 2024-01-18 | End: 2024-01-18

## 2024-01-18 RX ORDER — MORPHINE SULFATE 4 MG/ML
4 INJECTION, SOLUTION INTRAMUSCULAR; INTRAVENOUS EVERY 2 HOUR PRN
Status: DISCONTINUED | OUTPATIENT
Start: 2024-01-18 | End: 2024-01-18

## 2024-01-18 RX ORDER — ONDANSETRON 2 MG/ML
4 INJECTION INTRAMUSCULAR; INTRAVENOUS EVERY 4 HOURS PRN
Status: ACTIVE | OUTPATIENT
Start: 2024-01-18 | End: 2024-01-18

## 2024-01-18 RX ORDER — MORPHINE SULFATE 4 MG/ML
4 INJECTION, SOLUTION INTRAMUSCULAR; INTRAVENOUS
Status: ACTIVE | OUTPATIENT
Start: 2024-01-18 | End: 2024-01-18

## 2024-01-18 RX ORDER — FAMOTIDINE 10 MG/ML
20 INJECTION, SOLUTION INTRAVENOUS 2 TIMES DAILY
Status: DISCONTINUED | OUTPATIENT
Start: 2024-01-18 | End: 2024-01-18

## 2024-01-18 RX ORDER — MELATONIN
3 NIGHTLY PRN
Status: DISCONTINUED | OUTPATIENT
Start: 2024-01-18 | End: 2024-01-18

## 2024-01-18 RX ORDER — ONDANSETRON 2 MG/ML
4 INJECTION INTRAMUSCULAR; INTRAVENOUS EVERY 6 HOURS PRN
Status: DISCONTINUED | OUTPATIENT
Start: 2024-01-18 | End: 2024-01-18

## 2024-01-18 RX ORDER — MORPHINE SULFATE 2 MG/ML
2 INJECTION, SOLUTION INTRAMUSCULAR; INTRAVENOUS EVERY 2 HOUR PRN
Status: DISCONTINUED | OUTPATIENT
Start: 2024-01-18 | End: 2024-01-18

## 2024-01-18 RX ORDER — MORPHINE SULFATE 2 MG/ML
1 INJECTION, SOLUTION INTRAMUSCULAR; INTRAVENOUS EVERY 2 HOUR PRN
Status: DISCONTINUED | OUTPATIENT
Start: 2024-01-18 | End: 2024-01-18

## 2024-01-18 RX ORDER — PROCHLORPERAZINE EDISYLATE 5 MG/ML
10 INJECTION INTRAMUSCULAR; INTRAVENOUS EVERY 6 HOURS PRN
Status: DISCONTINUED | OUTPATIENT
Start: 2024-01-18 | End: 2024-01-18

## 2024-01-18 RX ORDER — HYDROCODONE BITARTRATE AND ACETAMINOPHEN 5; 325 MG/1; MG/1
1 TABLET ORAL EVERY 4 HOURS PRN
Status: DISCONTINUED | OUTPATIENT
Start: 2024-01-18 | End: 2024-01-18

## 2024-01-18 NOTE — PLAN OF CARE
Received pt actively vomiting, as per pt it was yellowish and taste awful. A&Ox4. VSS. RA. . Denies chest pain and SOB.   GI: Abdomen soft, nondistended. Hypoactive BS   : Voids freely   Up ad john  Drains: none  Incisions: None  Diet: NPO  IVF running per order. 0.9 NS @125ml/hr  All appropriate safety measures in place. All questions and concerns addressed.     Referred to Dr. Nesbitt re: pt's status, Compazine, Scop patch and Famotidine ordered and given. Patient settled and able to sleep after these given. No vomiting seen. DR. Russo was aware of this case as per ER MD notes.    @6:30Am- Dr Valencia asked via secure messaging  re status of patient, updated him and ordered to start on clear liquid diet and administer Toradol for pain. Diet ordered and administered Toradol for her abdo pain.

## 2024-01-18 NOTE — ED QUICK NOTES
Orders for admission, patient is aware of plan and ready to go upstairs. Any questions, please call ED RN Lidia at extension [phone].     Patient Covid vaccination status: Fully vaccinated     COVID Test Ordered in ED: None    COVID Suspicion at Admission: N/A    Running Infusions:  None    Mental Status/LOC at time of transport: A&Ox4    Other pertinent information:   CIWA score: N/A   NIH score:  N/A

## 2024-01-18 NOTE — DISCHARGE PLANNING
Pt discharged from unit   IV removed with no difficulties   Went over AVS in detail - no further questions or concerns present.     Future Appointments   Date Time Provider Department Center   1/23/2024 11:00 AM Terese Tellez APRN [facility]

## 2024-01-18 NOTE — ED PROVIDER NOTES
Patient was signed out pending HIDA scan.  HIDA scan showed no bile leak.  Labs were unremarkable.  CT demonstrated small amount of free air which was likely secondary to the surgery.  On reassessment, patient is nauseous and still reports persistent discomfort.  Her abdomen is soft without any tenderness, guarding or rigidity.  With her persistent discomfort, patient was admitted to the hospitalist.  Surgery Dr. Russo was notified of results with plan for admission.

## 2024-01-18 NOTE — CONSULTS
[facility]  Report of Consultation    Miriam Jacome Patient Status:  Observation    1980 MRN [id number]   Location [facility] 3NW-A Attending Pari Loja,    Hosp Day # 0 PCP None Pcp     24    Reason for Consultation:    Surgical Consultation     CC:   Chief Complaint   Patient presents with    Abdomen/Flank Pain        History of Present Illness:    Miriam Jacome is a a(n) 43 year old female. Patient was evaluated in our office yesterday for complaints of abdominal pain, n/v. She is post op robotic cholecystectomy 24  She was sent to the ER for further work up concerned for bile leak  CTA performed no PE, HIDA scan negative for bile leak  She was admitted for IV fluids and pain control  She does report this afternoon her pain is improved, she is tolerating clear liquids. No longer having any nausea nor emesis.       Past Medical History:    Past Medical History:   Diagnosis Date    Bipolar affective (HCC)     Diverticulitis     Esophageal reflux     Kidney stones        Past Surgical History:    Past Surgical History:   Procedure Laterality Date    APPENDECTOMY      APPENDECTOMY         &     COLONOSCOPY  2017    COLONOSCOPY N/A 2020    Procedure: COLONOSCOPY;  Surgeon: Lane Cole MD;  Location:  ENDOSCOPY    D & C      LITHOTRIPSY  2008    nephrostomy tube removed    OTHER      benign bilateral breast biopsy    OTHER      Nephrostomy tube insertion left kidney-removed     OTHER SURGICAL HISTORY  2007    nephroscopy with tube insertion    OTHER SURGICAL HISTORY  2008    kidney tube insertion removed    OTHER SURGICAL HISTORY  2008    lithotripsy    OTHER SURGICAL HISTORY      ashlie breast bxs \"neg\"    OTHER SURGICAL HISTORY Left 2015    Patellofemoral ligament reconstruction and tibial tubercle transfer    REMOVAL GALLBLADDER      SPINE SURGERY PROCEDURE UNLISTED  2014    cervical spine discectomy       Family  History:    Family History   Problem Relation Age of Onset    Heart Disease Mother     Cancer Maternal Grandmother     Stroke Sister        Social History:     reports that she quit smoking about 22 years ago. Her smoking use included cigarettes. She has a 5 pack-year smoking history. She has been exposed to tobacco smoke. She quit smokeless tobacco use about 29 years ago. She reports that she does not currently use alcohol. She reports current drug use. Frequency: 3.00 times per week. Drug: Cannabis.    Allergies:    Allergies   Allergen Reactions    Aspirin OTHER (SEE COMMENTS)     Stomach upset    Dairy Products ANAPHYLAXIS    Gluten Flour DIARRHEA and NAUSEA AND VOMITING       Current Medications:      Current Facility-Administered Medications:     enoxaparin (Lovenox) 40 MG/0.4ML SUBQ injection 40 mg, 40 mg, Subcutaneous, Daily    acetaminophen (Tylenol) tab 650 mg, 650 mg, Oral, Q4H PRN **OR** HYDROcodone-acetaminophen (Norco) 5-325 MG per tab 1 tablet, 1 tablet, Oral, Q4H PRN **OR** HYDROcodone-acetaminophen (Norco) 5-325 MG per tab 2 tablet, 2 tablet, Oral, Q4H PRN    morphINE PF 2 MG/ML injection 1 mg, 1 mg, Intravenous, Q2H PRN **OR** morphINE PF 2 MG/ML injection 2 mg, 2 mg, Intravenous, Q2H PRN **OR** morphINE PF 4 MG/ML injection 4 mg, 4 mg, Intravenous, Q2H PRN    melatonin tab 3 mg, 3 mg, Oral, Nightly PRN    ondansetron (Zofran) 4 MG/2ML injection 4 mg, 4 mg, Intravenous, Q6H PRN    scopolamine (Transderm-Scop) 1 MG/3DAYS patch 1 patch, 1 patch, Transdermal, Q72H    prochlorperazine (Compazine) 10 MG/2ML injection 10 mg, 10 mg, Intravenous, Q6H PRN    famotidine (Pepcid) 20 mg/2mL injection 20 mg, 20 mg, Intravenous, BID    ketorolac (Toradol) 15 MG/ML injection 15 mg, 15 mg, Intravenous, Q6H PRN    Home Medications:    No current facility-administered medications on file prior to encounter.     Current Outpatient Medications on File Prior to Encounter   Medication Sig Dispense Refill     HYDROcodone-acetaminophen (NORCO) 5-325 MG Oral Tab Take 1 tablet by mouth every 6 (six) hours as needed for Pain. 20 tablet 0       Review of Systems:    10 point review performed pertinent positives and negatives per history of present illness.    Physical Exam:    Blood pressure 97/56, pulse 50, temperature 98 °F (36.7 °C), temperature source Oral, resp. rate 18, height 5' 1\" (1.549 m), weight 124 lb 9.6 oz (56.5 kg), last menstrual period 09/04/2023, SpO2 98%.    GENERAL: Alert and oriented x 3, well developed, well nourished female, in no apparent distress    SKIN: anicteric    RESPIRATORY: non labored breathing    CARDIOVASCULAR: RRR    ABDOMEN: incisions intact, no signs of infection, soft, improved tenderness no guarding     LYMPHATIC: no lymphadenopathy    EXTREMITIES: no cyanosis, clubbing or edema    .    Laboratory Data:  Recent Labs   Lab 01/17/24  1232 01/18/24  0511   WBC 10.1 9.8   HGB 14.1 12.2   MCV 91.3 90.2   .0 254.0       Recent Labs   Lab 01/17/24  1232 01/18/24  0511    140   K 4.1 4.6    112   CO2 25.0 26.0   BUN 9 12   CREATSERUM 0.62 0.64   GLU 79 80   CA 9.2 8.4*   MG  --  2.2   PHOS  --  3.7       Recent Labs   Lab 01/17/24  1232 01/18/24  0511   ALT 18 14   AST 12* 13*   ALB 3.5 3.1*       No results for input(s): \"TROP\" in the last 168 hours.          Radiology:    NM GB HEPATOBILIARY SPECT/CT (SINGLE) 1 DAY (CPT=78226/20915)    Result Date: 1/17/2024  PROCEDURE:  NM GB HEPATOBILIARY SPECT/CT (SINGLE) 1 DAY (CPT=78226/48896)  COMPARISON:  [facility] , CT, CTA CHEST + CT ABD (W) + CT PEL (W) (CPT=71275/04099), 1/17/2024, 3:07 PM.  INDICATIONS:  worsening abdominal pain. gallbladder out 1/12 concerned for bile leak  TECHNIQUE:  Tc99m labeled mebrofenin was injected IV, and dynamic images of the abdomen were obtained in the anterior projection for one hour. Additional dedicated SPECT images were taken with concurrent CT scan for both anatomical localization as well as  attenuation correction. Scan was reformatted into multi-planar reconstructions on a dedicated workstation.    RADIOPHARMACEUTICAL(S):  5.0 mCi Tc-99m mebrofenin.  FINDINGS:  There is normal uptake of radiotracer from the blood pool by the liver.  The scintigraphic appearance of the liver is normal.  Expected excretion into the biliary tree.  Passage into the duodenum and antegrade movement in the small bowel with peristalsis.  No additional activity to suggest extra luminal radiotracer. The CT portion demonstrates a small amount of intraperitoneal air, similar to the earlier CT and may be from recent abdominal surgery.             CONCLUSION:  Negative for bile leak.   LOCATION:  [facility]    Dictated by (CST): Rodrigo Gimenez MD on 1/17/2024 at 10:01 PM     Finalized by (CST): Rodrigo Gimenez MD on 1/17/2024 at 10:04 PM       CTA CHEST + CT ABD (W) + CT PEL (W) SH(CPT=71275/45100)    Result Date: 1/17/2024  PROCEDURE:  CTA CHEST + CT ABD (W) + CT PEL (W) SH(CPT=71275/74110)  COMPARISON:  [facility] , CT, CTA ABD/PEL (CPT=74174), 6/08/2020, 11:02 PM.  [facility], CT, CT ABDOMEN+PELVIS KIDNEYSTONE 2D RNDR(NO IV,NO ORAL)(CPT=74176), 1/03/2022, 3:29 PM.  [facility] , CT, CT ANGIOGRAPHY, CHEST (CPT=71275), 6/08/2020, 11:02 PM.  INDICATIONS:  worsening abdominal pain. gallbladder out 1/12 concerned for bile leak  TECHNIQUE:  CT of the chest (with CTA imaging), abdomen, and pelvis were obtained post- injection of non-ionic intravenous contrast material. Multi-planar reformatted/3-D images were created to optimize visualization of vascular anatomy.  Dose reduction techniques were used. Dose information is transmitted to the ACR (American College of Radiology) NRDR (National Radiology Data Registry) which includes the Dose Index Registry.  PATIENT STATED HISTORY:(As transcribed by Technologist)   Patient reports with worsening abdominal pain after getting her gallbladder out on Friday. Nausea, pain, vomiting.  Denies fevers    pain radiates  up to right collar bone when severe Pain even when drinking water or walking    CONTRAST USED:  100cc of Isovue 370  FINDINGS:   CHEST:  LUNGS:  Mild dependent bibasilar atelectasis.  No mass or consolidation. MEDIASTINUM:  Small hiatal hernia.  No mass or lymphadenopathy. FELA:  No mass or adenopathy.  CARDIAC:  Trace coronary artery calcium. PLEURA:  No mass or effusion.  CHEST WALL:  No mass or axillary adenopathy.  AORTA:  No aneurysm or dissection.  VASCULATURE:  No visible pulmonary arterial thrombus or attenuation.   ABDOMEN/PELVIS: LIVER:  No enlargement, atrophy, abnormal density, or significant focal lesion.  BILIARY:  Absent gallbladder.  No bile duct dilatation.  No fluid or gas collection in gallbladder fossa. PANCREAS:  No lesion, fluid collection, ductal dilatation, or atrophy.  SPLEEN:  No enlargement or focal lesion.  KIDNEYS:  3 mm nonobstructing midpole calculus of right kidney.  No hydronephrosis.  Small bilateral renal cyst have a benign appearance and do not require further workup or follow-up. ADRENALS:  No mass or enlargement.  AORTA:  Mild atherosclerotic calcifications.  No aneurysm or dissection. RETROPERITONEUM:  No mass or adenopathy.  BOWEL/MESENTERY:  Small amount of free intraperitoneal air.  No bowel distention or bowel wall thickening.  Small hiatal hernia.  Small amount of free fluid in the pelvic cul-de-sac. ABDOMINAL WALL:  Postsurgical gas in muscles in the subcutaneous fat of the right anterior abdominal wall.  No mass, hernia or air-fluid level. URINARY BLADDER:  Collapsed.  No visible focal wall thickening, lesion, or calculus.  PELVIC NODES:  No adenopathy.  PELVIC ORGANS:  IUD and bilateral Essure fallopian tube wires again demonstrated. BONES:  Stable mild degenerative changes of spine.            CONCLUSION:  1. Small amount of free air in the abdomen is most likely secondary to recent surgery, ruptured viscus cannot be excluded with certainty.  Correlate clinically for  signs of acute abdomen. 2. Dependent bibasilar atelectasis. 3. Small hiatal hernia. 4. Right nephrolithiasis. 5. Trace coronary artery calcium.   LOCATION:  [facility]   Dictated by (CST): Tarun Stringer MD on 1/17/2024 at 3:41 PM     Finalized by (CST): Tarun Stringer MD on 1/17/2024 at 3:53 PM          Problem List:    Patient Active Problem List   Diagnosis    Closed dislocation of left patella  Global 2/11/2016    Patellar instability of left knee    Orthopedic aftercare    Dislocation of patella, left, closed, subsequent encounter    Stiffness of left knee    GERD (gastroesophageal reflux disease)    Weakness of left leg    Pain from implanted hardware, initial encounter , removal hardware left knee  Global 12/08/2017    Pain from implanted hardware, subsequent encounter    Diarrhea    Hypokalemia    Acidosis    Dyspnea, unspecified type    Abdominal pain, acute    Fever, unspecified fever cause    Nausea vomiting and diarrhea    Suspected COVID-19 virus infection    Ground glass opacity present on imaging of lung    Abdominal pain    Nausea & vomiting    Disturbance of skin sensation    Muscle pain    Prolonged QT interval    Palpitations    Family history of premature CAD    Abdominal pain, epigastric    Bipolar disorder (HCC)       Impression:    42 y/o S/P robotic cholecystectomy 1/12/24  CTA, HIDA neg  Possible enteritis?   Pain improved, nausea improved  Tolerating clears     Plan:    Would increase diet as tolerated  Can dc home today from surgical perspective if tolerating diet, pain controled  All questions answered      JUANITO Key  [facility]  General Surgery  1/18/2024

## 2024-01-18 NOTE — ED QUICK NOTES
Rounding Completed    Plan of Care reviewed. Waiting for inpatient room.  Elimination needs assessed.  Provided medication.    Bed is locked and in lowest position. Call light within reach.

## 2024-01-18 NOTE — ED PROVIDER NOTES
Patient Seen in: [facility] Emergency Department      History     Chief Complaint   Patient presents with    Abdomen/Flank Pain     Stated Complaint: worsening abdominal pain. gallbladder out  concerned for bile leak    Subjective:   HPI    43-year-old female history of bipolar, GERD, kidney stones presents to ED with worsening abdominal pain last 2 days, she had a robotic assisted cholecystectomy  and states that she is feeling pain in the epigastric region as well as right upper quadrant.  She describes pain with a deep breath and pain by her right collarbone.  Denies fevers, chills.    Objective:   Past Medical History:   Diagnosis Date    Bipolar affective (HCC)     Diverticulitis     Esophageal reflux     Kidney stones               Past Surgical History:   Procedure Laterality Date    APPENDECTOMY      APPENDECTOMY         &     COLONOSCOPY  2017    COLONOSCOPY N/A 2020    Procedure: COLONOSCOPY;  Surgeon: Lane Cole MD;  Location:  ENDOSCOPY    D & C      LITHOTRIPSY  2008    nephrostomy tube removed    OTHER      benign bilateral breast biopsy    OTHER  2007    Nephrostomy tube insertion left kidney-removed     OTHER SURGICAL HISTORY  2007    nephroscopy with tube insertion    OTHER SURGICAL HISTORY  2008    kidney tube insertion removed    OTHER SURGICAL HISTORY  2008    lithotripsy    OTHER SURGICAL HISTORY      ashlie breast bxs \"neg\"    OTHER SURGICAL HISTORY Left 2015    Patellofemoral ligament reconstruction and tibial tubercle transfer    REMOVAL GALLBLADDER      SPINE SURGERY PROCEDURE UNLISTED      cervical spine discectomy                Social History     Socioeconomic History    Marital status:    Tobacco Use    Smoking status: Former     Packs/day: 0.50     Years: 10.00     Additional pack years: 0.00     Total pack years: 5.00     Types: Cigarettes     Quit date: 2001     Years since quittin.2     Passive exposure:  Past    Smokeless tobacco: Former     Quit date: 9/7/1994   Vaping Use    Vaping Use: Former   Substance and Sexual Activity    Alcohol use: Not Currently     Comment: glass of wine once per month    Drug use: Yes     Frequency: 3.0 times per week     Types: Cannabis   Other Topics Concern    Caffeine Concern No    Exercise Yes     Comment: work              Review of Systems    Positive for stated complaint: worsening abdominal pain. gallbladder out 1/12 concerned for bile leak  Other systems are as noted in HPI.  Constitutional and vital signs reviewed.      All other systems reviewed and negative except as noted above.    Physical Exam     ED Triage Vitals [01/17/24 1225]   /80   Pulse 79   Resp 22   Temp 98.8 °F (37.1 °C)   Temp src Temporal   SpO2 98 %   O2 Device None (Room air)       Current:/78   Pulse 71   Temp 98.8 °F (37.1 °C) (Temporal)   Resp 16   Ht 154.9 cm (5' 1\")   Wt 56.7 kg   LMP 09/04/2023 (Approximate)   SpO2 100%   BMI 23.62 kg/m²         Physical Exam    Vital signs reviewed.  Nursing note reviewed.  Constitutional: Alert, well-appearing  Head: Normocephalic, atraumatic  Mouth: Moist  Eyes: Extraocular muscles intact, pupils equal  Cardiovascular: Regular rate and rhythm  Pulmonary: Effort normal, breath sounds normal  Abdomen: Soft, epigastric and right upper quadrant tender, nondistended  Skin: Warm and dry.  Incisions clean and dry no signs of infection  Musculoskeletal range of motion grossly normal all extremities  Neuro: Alert, at baseline, no focal neuro deficit.  Moves all extremities against gravity  Psych: Mood normal          ED Course     Labs Reviewed   COMP METABOLIC PANEL (14) - Abnormal; Notable for the following components:       Result Value    AST 12 (*)     All other components within normal limits   D-DIMER - Abnormal; Notable for the following components:    D-Dimer 0.79 (*)     All other components within normal limits   URINALYSIS, ROUTINE -  Abnormal; Notable for the following components:    Clarity Urine Turbid (*)     RBC Urine 3-5 (*)     Bacteria Urine 1+ (*)     Squamous Epi. Cells Few (*)     Yeast Urine Present (*)     All other components within normal limits   LIPASE - Normal   HCG, BETA SUBUNIT (QUANT PREGNANCY TEST) - Normal   POCT PREGNANCY URINE - Normal   CBC WITH DIFFERENTIAL WITH PLATELET    Narrative:     The following orders were created for panel order CBC With Differential With Platelet.  Procedure                               Abnormality         Status                     ---------                               -----------         ------                     CBC W/ DIFFERENTIAL[[id number]]                              Final result                 Please view results for these tests on the individual orders.   RAINBOW DRAW LAVENDER   RAINBOW DRAW LIGHT GREEN   RAINBOW DRAW BLUE   CBC W/ DIFFERENTIAL             CTA CHEST + CT ABD (W) + CT PEL (W) SH(CPT=71275/70647)    Result Date: 1/17/2024  PROCEDURE:  CTA CHEST + CT ABD (W) + CT PEL (W) SH(CPT=71275/35452)  COMPARISON:  [facility] , CT, CTA ABD/PEL (CPT=74174), 6/08/2020, 11:02 PM.  [facility], CT, CT ABDOMEN+PELVIS KIDNEYSTONE 2D RNDR(NO IV,NO ORAL)(CPT=74176), 1/03/2022, 3:29 PM.  [facility] , CT, CT ANGIOGRAPHY, CHEST (CPT=71275), 6/08/2020, 11:02 PM.  INDICATIONS:  worsening abdominal pain. gallbladder out 1/12 concerned for bile leak  TECHNIQUE:  CT of the chest (with CTA imaging), abdomen, and pelvis were obtained post- injection of non-ionic intravenous contrast material. Multi-planar reformatted/3-D images were created to optimize visualization of vascular anatomy.  Dose reduction techniques were used. Dose information is transmitted to the ACR (American College of Radiology) NRDR (National Radiology Data Registry) which includes the Dose Index Registry.  PATIENT STATED HISTORY:(As transcribed by Technologist)   Patient reports with worsening abdominal pain after getting her  gallbladder out on Friday. Nausea, pain, vomiting.  Denies fevers    pain radiates up to right collar bone when severe Pain even when drinking water or walking    CONTRAST USED:  100cc of Isovue 370  FINDINGS:   CHEST:  LUNGS:  Mild dependent bibasilar atelectasis.  No mass or consolidation. MEDIASTINUM:  Small hiatal hernia.  No mass or lymphadenopathy. FEAL:  No mass or adenopathy.  CARDIAC:  Trace coronary artery calcium. PLEURA:  No mass or effusion.  CHEST WALL:  No mass or axillary adenopathy.  AORTA:  No aneurysm or dissection.  VASCULATURE:  No visible pulmonary arterial thrombus or attenuation.   ABDOMEN/PELVIS: LIVER:  No enlargement, atrophy, abnormal density, or significant focal lesion.  BILIARY:  Absent gallbladder.  No bile duct dilatation.  No fluid or gas collection in gallbladder fossa. PANCREAS:  No lesion, fluid collection, ductal dilatation, or atrophy.  SPLEEN:  No enlargement or focal lesion.  KIDNEYS:  3 mm nonobstructing midpole calculus of right kidney.  No hydronephrosis.  Small bilateral renal cyst have a benign appearance and do not require further workup or follow-up. ADRENALS:  No mass or enlargement.  AORTA:  Mild atherosclerotic calcifications.  No aneurysm or dissection. RETROPERITONEUM:  No mass or adenopathy.  BOWEL/MESENTERY:  Small amount of free intraperitoneal air.  No bowel distention or bowel wall thickening.  Small hiatal hernia.  Small amount of free fluid in the pelvic cul-de-sac. ABDOMINAL WALL:  Postsurgical gas in muscles in the subcutaneous fat of the right anterior abdominal wall.  No mass, hernia or air-fluid level. URINARY BLADDER:  Collapsed.  No visible focal wall thickening, lesion, or calculus.  PELVIC NODES:  No adenopathy.  PELVIC ORGANS:  IUD and bilateral Essure fallopian tube wires again demonstrated. BONES:  Stable mild degenerative changes of spine.            CONCLUSION:  1. Small amount of free air in the abdomen is most likely secondary to recent  surgery, ruptured viscus cannot be excluded with certainty.  Correlate clinically for signs of acute abdomen. 2. Dependent bibasilar atelectasis. 3. Small hiatal hernia. 4. Right nephrolithiasis. 5. Trace coronary artery calcium.   LOCATION:  [facility]   Dictated by (CST): Tarun Stringer MD on 1/17/2024 at 3:41 PM     Finalized by (CST): Tarun Stringer MD on 1/17/2024 at 3:53 PM               [facility]      Medical Decision Making:    Differential diagnosis before testing includes pneumonia, PE, bile leak potential life threatening diagnosis which is a medical condition that poses a threat to life/function.     I reviewed prior external ED notes including surgery note 1/12    Discussions of management with: Dr. Russo, surgery, requested HIDA scan to rule out bile leak.  Reviewed with her finding of small amount of free air in the abdomen most likely secondary to recent surgery, ruptured viscus cannot be excluded.    I personally reviewed the CT abdomen pelvis and my independent interpretation includes small amount of free air most likely related to recent surgery.    Shared decision making:    Discussed with surgery Dr. Russo as above, aware of CT findings.  Patient given IV Toradol, she stated she felt better after that.  Later she has for more pain medicine, IV morphine given.  Will await HIDA scan and call back to surgery with results.                               Medical Decision Making      Disposition and Plan     Clinical Impression:  1. Abdominal pain, epigastric         Disposition:  There is no disposition on file for this visit.  There is no disposition time on file for this visit.    Follow-up:  No follow-up provider specified.        Medications Prescribed:  Current Discharge Medication List

## 2024-01-18 NOTE — PLAN OF CARE
A&Ox4. VSS. RA. Denies chest pain and SOB.   GI: Abdomen soft, nondistended. Passing gas.   Denies nausea.   : Voids.   Pain controlled with PRN pain medications.   Up independently   Drains: None   Incisions: x4 previous abd lap sites   Diet: Clear liquid diet - tolerating well   IVF running per order. All appropriate safety measures in place. All questions and concerns addressed.

## 2024-01-18 NOTE — PROGRESS NOTES
[facility]     Hospitalist Progress Note     Miriam LA Jacome Patient Status:  Observation    1980 MRN [id number]   Formerly [facility] 3NW-A Attending Pari Loja, DO   Hosp Day # 0 PCP None Pcp     Chief Complaint: n/v, abdominal pain    Subjective:     Patient feeling much better. Reports profuse vomiting/diarrhea PTA. No further vomiting/diarrhea since admission. Abdominal pain much improved; just feeling some mild tenderness around RUQ and umbilicus incisions    Objective:    Review of Systems:   A comprehensive review of systems was completed; pertinent positive and negatives stated in subjective.    Vital signs:  Temp:  [98 °F (36.7 °C)-98.8 °F (37.1 °C)] 98 °F (36.7 °C)  Pulse:  [] 50  Resp:  [13-22] 18  BP: ()/(54-86) 97/56  SpO2:  [98 %-100 %] 98 %    Physical Exam:    General: No acute distress  Respiratory: No wheezes, no rhonchi  Cardiovascular: S1, S2, regular rate and rhythm  Abdomen: Soft, Non-tender, non-distended, positive bowel sounds. Lap surgical incisions c/d/i  Neuro: No new focal deficits.   Extremities: No edema      Diagnostic Data:    Labs:  Recent Labs   Lab 24  1232 24  0511   WBC 10.1 9.8   HGB 14.1 12.2   MCV 91.3 90.2   .0 254.0       Recent Labs   Lab 24  1232 24  0511   GLU 79 80   BUN 9 12   CREATSERUM 0.62 0.64   CA 9.2 8.4*   ALB 3.5 3.1*    140   K 4.1 4.6    112   CO2 25.0 26.0   ALKPHO 60 54   AST 12* 13*   ALT 18 14   BILT 0.3 0.4   TP 7.1 6.0*       Estimated Creatinine Clearance: 84.6 mL/min (based on SCr of 0.64 mg/dL).    No results for input(s): \"TROP\", \"TROPHS\", \"CK\" in the last 168 hours.    No results for input(s): \"PTP\", \"INR\" in the last 168 hours.     Microbiology    No results found for this visit on 24.      Imaging: Reviewed in Epic.    Medications:    enoxaparin  40 mg Subcutaneous Daily    scopolamine  1 patch Transdermal Q72H    famotidine  20 mg Intravenous BID        Assessment & Plan:      #Intractable n/v/d, abdominal pain; suspect possible viral gastroenteritis  - CTAP without acute pathology or concerning findings  - improving with supportive care  - IVF, pain control, anti-emetics PRN  - CLD, ADAT  - general surgery consulted     #Recent lap claudia on 1/12  - general surgery consulted    #GERD  - pepcid    #Bipolar disorder      Pari Loja, DO    Supplementary Documentation:     Quality:  DVT Mechanical Prophylaxis:   SCDs,    DVT Pharmacologic Prophylaxis   Medication    enoxaparin (Lovenox) 40 MG/0.4ML SUBQ injection 40 mg                Code Status: Full Code  Michel: No urinary catheter in place  Michel Duration (in days):   Central line:    REX:     Discharge is dependent on: clinical state  At this point Ms. Jacome is expected to be discharge to: home    The 21st Century Cures Act makes medical notes like these available to patients in the interest of transparency. Please be advised this is a medical document. Medical documents are intended to carry relevant information, facts as evident, and the clinical opinion of the practitioner. The medical note is intended as peer to peer communication and may appear blunt or direct. It is written in medical language and may contain abbreviations or verbiage that are unfamiliar.

## 2024-01-18 NOTE — H&P
[facility]                                                               History [facility] Physical         Miriam Jacome Patient Status:  Emergency    1980 MRN [id number]   Location [facility] EMERGENCY DEPARTMENT Attending Joan Disla DO   Hosp Day # 0 PCP None Pcp     Chief Complaint: Abdominal pain, nausea, vomiting    History of Present Illness:  Miriam Jacome is a 43 year old female admitted with abdominal pain, nausea, vomiting.  Patient states she had an outpatient laparoscopic cholecystectomy done by surgery Dr. Valencia on 2024.  Patient states she had some pain after the surgery which she thought it was expected postsurgical pain.  Patient states she felt worse today, had 8 out of 10 pain associate with nausea vomiting.  Had an episode of diarrhea also and came to the emergency room.  Denies any chest pain or shortness of breath.  Denies any fever or chills.  Denies any dizziness.  Pain increasing with deep breathing.  Abdominal pain mostly in the epigastric and right upper quadrant.  Denies any fever or chills.  Has nausea and vomiting.      History:  Past Medical History:   Diagnosis Date    Bipolar affective (HCC)     Diverticulitis     Esophageal reflux     Kidney stones        Past Surgical History:   Procedure Laterality Date    APPENDECTOMY      APPENDECTOMY         &     COLONOSCOPY  2017    COLONOSCOPY N/A 2020    Procedure: COLONOSCOPY;  Surgeon: Lane Cole MD;  Location:  ENDOSCOPY    D & C      LITHOTRIPSY      nephrostomy tube removed    OTHER      benign bilateral breast biopsy    OTHER  2007    Nephrostomy tube insertion left kidney-removed     OTHER SURGICAL HISTORY  2007    nephroscopy with tube insertion    OTHER SURGICAL HISTORY  2008    kidney tube insertion removed    OTHER SURGICAL HISTORY  2008    lithotripsy    OTHER SURGICAL HISTORY      ashlie breast bxs \"neg\"    OTHER SURGICAL HISTORY Left 2015     Patellofemoral ligament reconstruction and tibial tubercle transfer    REMOVAL GALLBLADDER      SPINE SURGERY PROCEDURE UNLISTED  2014    cervical spine discectomy       Family history:  Family History   Problem Relation Age of Onset    Heart Disease Mother     Cancer Maternal Grandmother     Stroke Sister       Reviewed    Social history:   reports that she quit smoking about 22 years ago. Her smoking use included cigarettes. She has a 5 pack-year smoking history. She has been exposed to tobacco smoke. She quit smokeless tobacco use about 29 years ago. She reports that she does not currently use alcohol. She reports current drug use. Frequency: 3.00 times per week. Drug: Cannabis.    Allergies:  Allergies   Allergen Reactions    Aspirin OTHER (SEE COMMENTS)     Stomach upset    Dairy Products ANAPHYLAXIS    Gluten Flour DIARRHEA and NAUSEA AND VOMITING       Home Medications:  (Not in a hospital admission)      Review of Systems:  A comprehensive 14 point review of systems was completed.  Pertinent positives and negatives noted in the the HPI.    Physical Exam:     Vital signs: Blood pressure 103/80, pulse 71, temperature 98.8 °F (37.1 °C), temperature source Temporal, resp. rate 16, height 5' 1\" (1.549 m), weight 125 lb (56.7 kg), last menstrual period 09/04/2023, SpO2 99%.  General: No acute distress.   HEENT: Moist mucous membranes.   Respiratory: Clear to auscultation bilaterally.  No wheezes. No rhonchi.  Cardiovascular: S1, S2.  Regular rate and rhythm.    Abdomen: Soft, epigastric and right upper quadrant tenderness,, nondistended.  Positive bowel sounds.  Laparoscopic incision in Dermabond and healing well with no erythema or drainage  Neurologic: Awake alert, no focal neurological deficits.  Musculoskeletal: Full range of motion of all extremities.  No pedal edema or calf tenderness  Integument: Laparoscopic incision in Dermabond and healing well  Psychiatric: Appropriate mood and affect.      Diagnostic  Data:      Laboratory Data:   Lab Results   Component Value Date    WBC 10.1 01/17/2024    HGB 14.1 01/17/2024    HCT 41.8 01/17/2024    .0 01/17/2024    CREATSERUM 0.62 01/17/2024    BUN 9 01/17/2024     01/17/2024    K 4.1 01/17/2024     01/17/2024    CO2 25.0 01/17/2024    GLU 79 01/17/2024    CA 9.2 01/17/2024    ALB 3.5 01/17/2024    ALKPHO 60 01/17/2024    BILT 0.3 01/17/2024    TP 7.1 01/17/2024    AST 12 01/17/2024    ALT 18 01/17/2024    LIP 28 01/17/2024    DDIMER 0.79 01/17/2024       Imaging:  Imaging data reviewed in Epic.  HIDA scan done with no bile leak  CTA chest abdomen pelvis done preliminary results with small amount of free air in the abdomen most likely secondary to recent laparoscopic surgery.  Dependent bibasilar atelectasis.  Small hiatal hernia.  Right nephrolithiasis.  Please follow final radiology report    ASSESSMENT / PLAN:     Abdominal pain, nausea and vomiting  IV Zofran.  Nausea vomiting  Pain management  Initially kept n.p.o. except ice chips  Surgeon consulted from ER  Recent laparoscopic cholecystectomy done on 1/12/2024 by surgery Dr. Erik Frey IV  Bipolar affective disorder      Quality:  DVT Prophylaxis: DVT Mechanical Prophylaxis:   SCDs,    DVT Pharmacologic Prophylaxis   Medication    enoxaparin (Lovenox) 40 MG/0.4ML SUBQ injection 40 mg              CODE status:   Code Status: Full Code  Michel: No urinary catheter in place  Central line:  No      Plan of care discussed with patient    Discussed with ER Physician.  Dr Loja will follow from morning tomorrow      Loretta Pierre MD  1/17/2024  11:03 PM

## 2024-01-19 ENCOUNTER — PATIENT OUTREACH (OUTPATIENT)
Dept: CASE MANAGEMENT | Age: 44
End: 2024-01-19

## 2024-01-19 NOTE — PROGRESS NOTES
LM for pt to call [facility] for TCM since discharge. NCM phone number was provided for pt to call back.    TCC contact information was provided for pt to call back as well.

## 2024-01-23 ENCOUNTER — TELEPHONE (OUTPATIENT)
Dept: INTERNAL MEDICINE CLINIC | Facility: CLINIC | Age: 44
End: 2024-01-23

## 2024-02-14 NOTE — PROGRESS NOTES
Continue eliquis 2.5 mg po BID   Patient is status post nasal septoplasty from 10-2-23 doing well in for splint removal.  Splints were removed in the usual fashion excellent result noted with midline septum.   Be with results follow-up as needed

## 2024-08-08 ENCOUNTER — HOSPITAL ENCOUNTER (EMERGENCY)
Facility: HOSPITAL | Age: 44
Discharge: HOME | End: 2024-08-08
Attending: EMERGENCY MEDICINE
Payer: MEDICAID

## 2024-08-08 VITALS
RESPIRATION RATE: 20 BRPM | OXYGEN SATURATION: 98 % | DIASTOLIC BLOOD PRESSURE: 82 MMHG | HEIGHT: 62 IN | TEMPERATURE: 98 F | SYSTOLIC BLOOD PRESSURE: 126 MMHG | HEART RATE: 81 BPM | WEIGHT: 140 LBS | BODY MASS INDEX: 25.76 KG/M2

## 2024-08-08 DIAGNOSIS — R21 RASH: Primary | ICD-10-CM

## 2024-08-08 PROCEDURE — 99283 EMERGENCY DEPT VISIT LOW MDM: CPT

## 2024-08-08 RX ORDER — EPINEPHRINE 0.3 MG/.3ML
0.3 INJECTION SUBCUTANEOUS ONCE AS NEEDED
Qty: 1 EACH | Refills: 0 | Status: SHIPPED | OUTPATIENT
Start: 2024-08-08 | End: 2024-08-08

## 2024-08-08 RX ORDER — GABAPENTIN 100 MG/1
100 CAPSULE ORAL 2 TIMES DAILY
Qty: 10 CAPSULE | Refills: 0 | Status: SHIPPED | OUTPATIENT
Start: 2024-08-08

## 2024-08-08 RX ORDER — DIPHENHYDRAMINE HCL 25 MG
25 CAPSULE ORAL EVERY 6 HOURS PRN
Qty: 20 CAPSULE | Refills: 0 | Status: SHIPPED | OUTPATIENT
Start: 2024-08-08

## 2024-08-08 RX ORDER — PREDNISONE 20 MG/1
40 TABLET ORAL DAILY
Qty: 10 TABLET | Refills: 0 | Status: SHIPPED | OUTPATIENT
Start: 2024-08-08 | End: 2024-08-13

## 2024-08-08 NOTE — ED PROVIDER NOTES
Patient Seen in: [facility] Emergency Department    History     Chief Complaint   Patient presents with    Rash       HPI    History is provided by patient/independent historian: Patient  43 year old female with history of bipolar affective disorder, diverticulitis, here with complaints of rash all over her body for the past 4 days.  She has a history of severe allergic reactions although she had allergy testing and only has a mild allergy to hazelnut.  No contact with that that she knows of.  She used her EpiPen several days ago without improvement of her symptoms.  She describes an allover burning sensation.  No history of neuropathy.    History reviewed.   Past Medical History:    Bipolar affective (HCC)    Diverticulitis    Esophageal reflux    Kidney stones         History reviewed.   Past Surgical History:   Procedure Laterality Date    Appendectomy      Appendectomy         &     Colonoscopy  2017    Colonoscopy N/A 2020    Procedure: COLONOSCOPY;  Surgeon: Lane Cole MD;  Location:  ENDOSCOPY    D & c      Lithotripsy  2008    nephrostomy tube removed    Other      benign bilateral breast biopsy    Other  2007    Nephrostomy tube insertion left kidney-removed     Other surgical history  2007    nephroscopy with tube insertion    Other surgical history  2008    kidney tube insertion removed    Other surgical history  2008    lithotripsy    Other surgical history      ashlie breast bxs \"neg\"    Other surgical history Left 2015    Patellofemoral ligament reconstruction and tibial tubercle transfer    Removal gallbladder      Spine surgery procedure unlisted      cervical spine discectomy         Home Medications reviewed :  (Not in a hospital admission)        History reviewed.   Social History     Socioeconomic History    Marital status:    Tobacco Use    Smoking status: Former     Current packs/day: 0.00     Average packs/day: 0.5 packs/day for  10.0 years (5.0 ttl pk-yrs)     Types: Cigarettes     Start date: 1991     Quit date: 2001     Years since quittin.7     Passive exposure: Past    Smokeless tobacco: Former     Quit date: 1994   Vaping Use    Vaping status: Former   Substance and Sexual Activity    Alcohol use: Not Currently     Comment: glass of wine once per month    Drug use: Yes     Frequency: 3.0 times per week     Types: Cannabis   Other Topics Concern    Caffeine Concern No    Exercise Yes     Comment: work         ROS  Review of Systems   Respiratory:  Negative for shortness of breath.    Cardiovascular:  Negative for chest pain.   Skin:  Positive for rash.   All other systems reviewed and are negative.     All other pertinent organ systems are reviewed and are negative.      Physical Exam     ED Triage Vitals   BP 24 1607 139/84   Pulse 24 1607 61   Resp 24 1607 20   Temp 24 1609 98.2 °F (36.8 °C)   Temp src --    SpO2 24 1607 98 %   O2 Device 24 1815 None (Room air)     Vital signs reviewed.      Physical Exam  Vitals and nursing note reviewed.   Cardiovascular:      Pulses: Normal pulses.   Pulmonary:      Effort: No respiratory distress.   Abdominal:      General: There is no distension.   Skin:     Findings: Rash present.      Comments: Signs of excoriation all over torso, no obvious urticaria, no cellulitis   Neurological:      Mental Status: She is alert.         ED Course       Labs:   Labs Reviewed - No data to display      My EKG Interpretation:   As reviewed and Interpreted by me      Imaging Results Available and Reviewed while in ED:   No results found.    Decision rules/scores evaluated: none      Diagnostic labs/tests considered but not ordered: CBC, BMP, type and screen, CXR    ED Medications Administered: Medications - No data to display             MDM       Medical Decision Making      Differential Diagnosis: After obtaining the patient's history, performing the  physical exam and reviewing the diagnostics, multiple initial diagnoses were considered based on the presenting problem including rash, contact dermatitis, urticaria    External document review: I personally reviewed available external medical records for any recent pertinent discharge summaries, testing, and procedures - the findings are as follows: 6/25/24 visit with Dr. Pino for allergic reaction    Complicating Factors: The patient already  has a past medical history of Bipolar affective (HCC), Diverticulitis, Esophageal reflux, and Kidney stones. to contribute to the complexity of this ED evaluation.    Procedures performed: none    Discussed management with physician/appropriate source: none    Considered admission/deescalation of care for: none    Social determinants of health affecting patient care: none    Prescription medications considered: benadryl, prednisone, gabapentin, epipen, discussed continuing current medication regimen    The patient requires continuous monitoring for: rash    Shared decision making: discussed possible admission          Disposition and Plan     Clinical Impression:  1. Rash        Disposition:  Discharge    Follow-up:  Andi Owens MD  [address] [place], [place] [id number] [phone]    Follow up        Medications Prescribed:  Current Discharge Medication List        START taking these medications    Details   EPINEPHrine 0.3 MG/0.3ML Injection Solution Auto-injector Inject 0.3 mL (1 each total) into the muscle once as needed (anaphylaxis).  Qty: 1 each, Refills: 0      diphenhydrAMINE 25 MG Oral Cap Take 1 capsule (25 mg total) by mouth every 6 (six) hours as needed.  Qty: 20 capsule, Refills: 0      predniSONE 20 MG Oral Tab Take 2 tablets (40 mg total) by mouth daily for 5 days.  Qty: 10 tablet, Refills: 0      gabapentin 100 MG Oral Cap Take 1 capsule (100 mg total) by mouth 2 (two) times daily.  Qty: 10 capsule, Refills: 0

## 2024-08-08 NOTE — ED INITIAL ASSESSMENT (HPI)
Patient arrives ambulatory through triage with c/o of diffuse rash over abdomen and chest that started 4 days ago.

## 2024-09-29 ENCOUNTER — HOSPITAL ENCOUNTER (EMERGENCY)
Age: 44
Discharge: HOME | End: 2024-09-30
Attending: EMERGENCY MEDICINE

## 2024-09-29 ENCOUNTER — APPOINTMENT (OUTPATIENT)
Dept: CT IMAGING | Age: 44
End: 2024-09-29
Attending: EMERGENCY MEDICINE

## 2024-09-29 VITALS
HEART RATE: 71 BPM | DIASTOLIC BLOOD PRESSURE: 80 MMHG | TEMPERATURE: 98.8 F | SYSTOLIC BLOOD PRESSURE: 122 MMHG | RESPIRATION RATE: 18 BRPM | OXYGEN SATURATION: 98 %

## 2024-09-29 DIAGNOSIS — S00.83XA TRAUMATIC ECCHYMOSIS OF FOREHEAD, INITIAL ENCOUNTER: ICD-10-CM

## 2024-09-29 DIAGNOSIS — G44.319 ACUTE POST-TRAUMATIC HEADACHE, NOT INTRACTABLE: Primary | ICD-10-CM

## 2024-09-29 PROCEDURE — 96374 THER/PROPH/DIAG INJ IV PUSH: CPT

## 2024-09-29 PROCEDURE — 99284 EMERGENCY DEPT VISIT MOD MDM: CPT

## 2024-09-29 PROCEDURE — 10002807 HB RX 258: Performed by: EMERGENCY MEDICINE

## 2024-09-29 PROCEDURE — 10002800 HB RX 250 W HCPCS: Performed by: EMERGENCY MEDICINE

## 2024-09-29 PROCEDURE — 70450 CT HEAD/BRAIN W/O DYE: CPT

## 2024-09-29 PROCEDURE — 96375 TX/PRO/DX INJ NEW DRUG ADDON: CPT

## 2024-09-29 RX ORDER — METOCLOPRAMIDE HYDROCHLORIDE 5 MG/ML
10 INJECTION INTRAMUSCULAR; INTRAVENOUS ONCE
Status: COMPLETED | OUTPATIENT
Start: 2024-09-29 | End: 2024-09-29

## 2024-09-29 RX ORDER — ONDANSETRON 2 MG/ML
4 INJECTION INTRAMUSCULAR; INTRAVENOUS ONCE
Status: COMPLETED | OUTPATIENT
Start: 2024-09-29 | End: 2024-09-29

## 2024-09-29 RX ORDER — DIPHENHYDRAMINE HYDROCHLORIDE 50 MG/ML
25 INJECTION INTRAMUSCULAR; INTRAVENOUS ONCE
Status: COMPLETED | OUTPATIENT
Start: 2024-09-29 | End: 2024-09-29

## 2024-09-29 RX ADMIN — ONDANSETRON 4 MG: 2 INJECTION INTRAMUSCULAR; INTRAVENOUS at 23:18

## 2024-09-29 RX ADMIN — SODIUM CHLORIDE 1000 ML: 9 INJECTION, SOLUTION INTRAVENOUS at 23:21

## 2024-09-29 RX ADMIN — METOCLOPRAMIDE 10 MG: 5 INJECTION, SOLUTION INTRAMUSCULAR; INTRAVENOUS at 23:19

## 2024-09-29 RX ADMIN — DIPHENHYDRAMINE HYDROCHLORIDE 25 MG: 50 INJECTION, SOLUTION INTRAMUSCULAR; INTRAVENOUS at 23:19

## 2024-09-29 ASSESSMENT — PAIN DESCRIPTION - PAIN TYPE: TYPE: ACUTE PAIN

## 2024-09-29 ASSESSMENT — PAIN SCALES - GENERAL: PAINLEVEL_OUTOF10: 9

## 2024-09-30 LAB
RAINBOW EXTRA TUBES HOLD SPECIMEN: NORMAL

## 2024-09-30 PROCEDURE — 96375 TX/PRO/DX INJ NEW DRUG ADDON: CPT

## 2024-09-30 PROCEDURE — 10002800 HB RX 250 W HCPCS: Performed by: EMERGENCY MEDICINE

## 2024-09-30 RX ORDER — BUTALBITAL, ACETAMINOPHEN AND CAFFEINE 50; 325; 40 MG/1; MG/1; MG/1
1-2 TABLET ORAL EVERY 6 HOURS PRN
Qty: 12 TABLET | Refills: 0 | Status: SHIPPED | OUTPATIENT
Start: 2024-09-30 | End: 2024-09-30

## 2024-09-30 RX ORDER — KETOROLAC TROMETHAMINE 15 MG/ML
15 INJECTION, SOLUTION INTRAMUSCULAR; INTRAVENOUS ONCE
Status: COMPLETED | OUTPATIENT
Start: 2024-09-30 | End: 2024-09-30

## 2024-09-30 RX ORDER — BUTALBITAL, ACETAMINOPHEN AND CAFFEINE 50; 325; 40 MG/1; MG/1; MG/1
1 TABLET ORAL EVERY 6 HOURS PRN
Qty: 12 TABLET | Refills: 0 | Status: SHIPPED | OUTPATIENT
Start: 2024-09-30 | End: 2024-10-03

## 2024-09-30 RX ADMIN — KETOROLAC TROMETHAMINE 15 MG: 15 INJECTION, SOLUTION INTRAMUSCULAR; INTRAVENOUS at 00:12

## 2024-09-30 ASSESSMENT — ENCOUNTER SYMPTOMS
VOMITING: 0
DIARRHEA: 0
DIZZINESS: 0
HEADACHES: 1
CHILLS: 0
LIGHT-HEADEDNESS: 0
FEVER: 0
ABDOMINAL PAIN: 0
PHOTOPHOBIA: 1
NAUSEA: 1
SHORTNESS OF BREATH: 0

## 2024-09-30 ASSESSMENT — PAIN SCALES - GENERAL: PAINLEVEL_OUTOF10: 9

## 2024-10-02 ENCOUNTER — CASE MANAGEMENT (OUTPATIENT)
Dept: OCCUPATIONAL MEDICINE | Age: 44
End: 2024-10-02

## 2024-11-06 ENCOUNTER — HOSPITAL ENCOUNTER (EMERGENCY)
Age: 44
Discharge: HOME | End: 2024-11-06
Attending: EMERGENCY MEDICINE

## 2024-11-06 VITALS
SYSTOLIC BLOOD PRESSURE: 113 MMHG | HEART RATE: 88 BPM | OXYGEN SATURATION: 100 % | DIASTOLIC BLOOD PRESSURE: 49 MMHG | RESPIRATION RATE: 20 BRPM | TEMPERATURE: 98 F

## 2024-11-06 DIAGNOSIS — T22.111A SUPERFICIAL BURN OF RIGHT FOREARM, INITIAL ENCOUNTER: Primary | ICD-10-CM

## 2024-11-06 PROCEDURE — 10002800 HB RX 250 W HCPCS: Performed by: PHYSICIAN ASSISTANT

## 2024-11-06 PROCEDURE — 10002803 HB RX 637: Performed by: PHYSICIAN ASSISTANT

## 2024-11-06 PROCEDURE — 96372 THER/PROPH/DIAG INJ SC/IM: CPT | Performed by: PHYSICIAN ASSISTANT

## 2024-11-06 PROCEDURE — 99283 EMERGENCY DEPT VISIT LOW MDM: CPT

## 2024-11-06 RX ORDER — HYDROCODONE BITARTRATE AND ACETAMINOPHEN 5; 325 MG/1; MG/1
1 TABLET ORAL ONCE
Status: COMPLETED | OUTPATIENT
Start: 2024-11-06 | End: 2024-11-06

## 2024-11-06 RX ORDER — HYDROCODONE BITARTRATE AND ACETAMINOPHEN 5; 325 MG/1; MG/1
1 TABLET ORAL EVERY 6 HOURS PRN
Qty: 12 TABLET | Refills: 0 | Status: SHIPPED | OUTPATIENT
Start: 2024-11-06

## 2024-11-06 RX ORDER — KETOROLAC TROMETHAMINE 30 MG/ML
30 INJECTION, SOLUTION INTRAMUSCULAR; INTRAVENOUS ONCE
Status: COMPLETED | OUTPATIENT
Start: 2024-11-06 | End: 2024-11-06

## 2024-11-06 RX ADMIN — HYDROCODONE BITARTRATE AND ACETAMINOPHEN 1 TABLET: 5; 325 TABLET ORAL at 13:17

## 2024-11-06 RX ADMIN — KETOROLAC TROMETHAMINE 30 MG: 30 INJECTION, SOLUTION INTRAMUSCULAR at 13:18

## 2025-01-17 NOTE — DISCHARGE INSTRUCTIONS
Continue to ice and take ibuprofen for pain. Follow up with [address] Try to avoid sneezing or coughing. Department of Anesthesiology  Postprocedure Note    Patient: Kiersten Steward  MRN: [id number]  YOB: 1968  Date of evaluation: 1/17/2025    Procedure Summary       Date: 01/17/25 Room / Location: [address]    Anesthesia Start: 1130 Anesthesia Stop: 1221    Procedure: COLOSTOMY REVISION/ DILATATION OF OSTOMY Diagnosis:       Colostomy stenosis (HCC)      (Colostomy stenosis (HCC) [K94.03])    Surgeons: Zen Ren DO Responsible Provider: Uli Betancur MD    Anesthesia Type: general ASA Status: 3            Anesthesia Type: No value filed.    Ilia Phase I: Ilia Score: 8    Ilia Phase II:      Anesthesia Post Evaluation    Patient location during evaluation: PACU  Patient participation: complete - patient participated  Level of consciousness: awake and alert  Airway patency: patent  Nausea & Vomiting: no nausea and no vomiting  Cardiovascular status: hemodynamically stable  Respiratory status: room air and spontaneous ventilation  Hydration status: euvolemic  Multimodal analgesia pain management approach  Pain management: adequate    No notable events documented.

## 2025-01-18 ENCOUNTER — HOSPITAL ENCOUNTER (EMERGENCY)
Age: 45
Discharge: HOME | End: 2025-01-18
Payer: OTHER MISCELLANEOUS

## 2025-01-18 ENCOUNTER — APPOINTMENT (OUTPATIENT)
Dept: GENERAL RADIOLOGY | Age: 45
End: 2025-01-18
Payer: OTHER MISCELLANEOUS

## 2025-01-18 VITALS
DIASTOLIC BLOOD PRESSURE: 86 MMHG | WEIGHT: 130 LBS | HEIGHT: 61 IN | RESPIRATION RATE: 18 BRPM | TEMPERATURE: 98 F | BODY MASS INDEX: 24.55 KG/M2 | OXYGEN SATURATION: 100 % | HEART RATE: 84 BPM | SYSTOLIC BLOOD PRESSURE: 142 MMHG

## 2025-01-18 DIAGNOSIS — S79.912A INJURY OF LEFT HIP, INITIAL ENCOUNTER: Primary | ICD-10-CM

## 2025-01-18 PROCEDURE — 99283 EMERGENCY DEPT VISIT LOW MDM: CPT

## 2025-01-18 PROCEDURE — 73502 X-RAY EXAM HIP UNI 2-3 VIEWS: CPT

## 2025-01-18 PROCEDURE — 99284 EMERGENCY DEPT VISIT MOD MDM: CPT

## 2025-01-18 RX ORDER — TRAMADOL HYDROCHLORIDE 50 MG/1
50 TABLET ORAL EVERY 8 HOURS PRN
Qty: 10 TABLET | Refills: 0 | Status: SHIPPED | OUTPATIENT
Start: 2025-01-18

## 2025-01-18 RX ORDER — ACETAMINOPHEN 500 MG
1000 TABLET ORAL ONCE
Status: COMPLETED | OUTPATIENT
Start: 2025-01-18 | End: 2025-01-18

## 2025-01-18 RX ORDER — IBUPROFEN 600 MG/1
600 TABLET, FILM COATED ORAL EVERY 6 HOURS PRN
COMMUNITY

## 2025-01-18 RX ORDER — TRAMADOL HYDROCHLORIDE 50 MG/1
50 TABLET ORAL EVERY 8 HOURS PRN
Qty: 10 TABLET | Refills: 0 | Status: SHIPPED | OUTPATIENT
Start: 2025-01-18 | End: 2025-01-18

## 2025-01-18 NOTE — ED INITIAL ASSESSMENT (HPI)
C/o left hip pain s/p injury 12/15  Patient reports slipping on a wet floor - falling onto left hip   Works at [employer]  
1 pair

## 2025-01-18 NOTE — ED PROVIDER NOTES
Patient Seen in: [facility] [place]      History     Chief Complaint   Patient presents with    Leg or Foot Injury     Stated Complaint: L hip injury 12/15    Subjective:   44-year-old female who got hurt at work on December 15.  States she has had left hip pain.  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  States pain continues.  She has been taking ibuprofen.  Not complain of any saddle anesthesia.              Objective:     Past Medical History:    Bipolar affective (HCC)    Crohn disease (HCC)    Diverticulitis    Esophageal reflux    Hiatal hernia    Kidney stones    Ulcerative colitis (HCC)              Past Surgical History:   Procedure Laterality Date    Appendectomy      Appendectomy         &     Colonoscopy  2017    Colonoscopy N/A 2020    Procedure: COLONOSCOPY;  Surgeon: Lane Cole MD;  Location:  ENDOSCOPY    D & c      Lithotripsy      nephrostomy tube removed    Other      benign bilateral breast biopsy    Other  2007    Nephrostomy tube insertion left kidney-removed     Other surgical history  2007    nephroscopy with tube insertion    Other surgical history  2008    kidney tube insertion removed    Other surgical history  2008    lithotripsy    Other surgical history      ashlie breast bxs \"neg\"    Other surgical history Left 2015    Patellofemoral ligament reconstruction and tibial tubercle transfer    Removal gallbladder      Spine surgery procedure unlisted      cervical spine discectomy                Social History     Socioeconomic History    Marital status:    Tobacco Use    Smoking status: Former     Current packs/day: 0.00     Average packs/day: 0.5 packs/day for 10.0 years (5.0 ttl pk-yrs)     Types: Cigarettes     Start date: 1991     Quit date: 2001     Years since quittin.2     Passive exposure: Past    Smokeless tobacco: Former     Quit date: 1994   Vaping Use    Vaping status: Former    Substance and Sexual Activity    Alcohol use: Not Currently     Comment: glass of wine once per month    Drug use: Yes     Frequency: 3.0 times per week     Types: Cannabis   Other Topics Concern    Caffeine Concern No    Exercise Yes     Comment: work     Social Drivers of Health     Financial Resource Strain: High Risk (2/24/2021)    Received from [facility], [facility]    Overall Financial Resource Strain (CARDIA)     Difficulty of Paying Living Expenses: Very hard   Food Insecurity: No Food Insecurity (9/18/2024)    Received from [facility]    Food Insecurity     Currently or in the past 3 months, have you worried your food would run out before you had money to buy more?: No     In the past 12 months, have you run out of food or been unable to get more?: No   Transportation Needs: No Transportation Needs (9/18/2024)    Received from [facility]    Transportation Needs     Currently or in the past 3 months, has lack of transportation kept you from medical appointments, getting food or medicine, or providing care to a family member?: Unrecognized value     Medical Transportation Needs?: No   Physical Activity: Unknown (9/8/2020)    Received from [facility], [facility]    Exercise Vital Sign     Days of Exercise per Week: 0 days    Received from [facility], [facility]    Social Connections   Housing Stability: Low Risk  (1/18/2024)    Housing Stability     Housing Instability: No                  Physical Exam     ED Triage Vitals [01/18/25 1037]   /86   Pulse 84   Resp 18   Temp 98.3 °F (36.8 °C)   Temp src Temporal   SpO2 100 %   O2 Device None (Room air)       Current Vitals:   Vital Signs  BP: 142/86  Pulse: 84  Resp: 18  Temp: 98.3 °F (36.8 °C)  Temp src: Temporal    Oxygen Therapy  SpO2: 100 %  O2 Device: None (Room air)        Physical Exam  Vitals and  nursing note reviewed.   Constitutional:       Appearance: Normal appearance. She is not ill-appearing, toxic-appearing or diaphoretic.   Pulmonary:      Effort: No respiratory distress.   Musculoskeletal:      Left hip: Tenderness present. No deformity, lacerations, bony tenderness or crepitus. Normal range of motion. Normal strength.      Left upper leg: Normal.      Left knee: Normal.      Left lower leg: Normal.   Neurological:      General: No focal deficit present.      Mental Status: She is alert and oriented to person, place, and time.   Psychiatric:         Mood and Affect: Mood normal.         Behavior: Behavior normal.             ED Course   Labs Reviewed - No data to display  XR HIP W OR WO PELVIS 2 OR 3 VIEWS, LEFT (CPT=73502)    Result Date: 1/18/2025  PROCEDURE:  XR HIP W OR WO PELVIS 2 OR 3 VIEWS, LEFT (CPT=73502)  TECHNIQUE:  Unilateral 2 to 3 views of the hip and pelvis if performed.  COMPARISON:  None.  INDICATIONS:  L hip injury 12/15  PATIENT STATED HISTORY: (As transcribed by Technologist)  Patient slipped on a wet floor on 12/15/24 and landed on her left hip.  She has pain and tenderness to the lateral aspect of the hip.  The pain radiates into the left buttock and left lower back.  She has difficulty weight bearing and flexing/abducting the hip.     FINDINGS:  BONES:  Normal.  No significant arthropathy or acute abnormality. SOFT TISSUES:  Negative.  No visible soft tissue swelling. EFFUSION:  None visible. OTHER:  IUD and essure coils            CONCLUSION:  No acute disease.    LOCATION:  [facility]   Dictated by (CST): Jerrell Gonzales MD on 1/18/2025 at 11:35 AM     Finalized by (CST): Jerrell Gonzales MD on 1/18/2025 at 11:37 AM                    [facility]              Medical Decision Making  Nontoxic adult female patient with left hip injury from December 15.  No vascular deficits.    I personally viewed, independently interpreted and radiology report was reviewed.  No fracture.  Recommend OTC  Motrin and Tylenol.  Will also prescribe tramadol for pain only by Motrin/Tylenol.  Will give Ortho recommendation.  Will give a few days off of work.    Supportive/home management of diagnosis/illness/injury discussed. Red flag symptoms discussed.  Signs and symptoms/criteria that would necessitate reevaluation, including ER evaluation discussed.  Patient and/or responsible adult verbalize and agree with management and plan of care.    Speech recognition software was used during this dictation.  There may be minor errors in transcription.      Amount and/or Complexity of Data Reviewed  Radiology: ordered and independent interpretation performed. Decision-making details documented in ED Course.    Risk  OTC drugs.  Prescription drug management.        Disposition and Plan     Clinical Impression:  1. Injury of left hip, initial encounter         Disposition:  Discharge  1/18/2025 11:50 am    Follow-up:  Robbie Acevedo MD  [address] [place] [address]  [address] [place] [address]  [phone]    Call in 3 day(s)            Medications Prescribed:  Current Discharge Medication List        START taking these medications    Details   traMADol 50 MG Oral Tab Take 1 tablet (50 mg total) by mouth every 8 (eight) hours as needed for Pain.  Qty: 10 tablet, Refills: 0    Associated Diagnoses: Injury of left hip, initial encounter                 Supplementary Documentation:

## 2025-01-18 NOTE — DISCHARGE INSTRUCTIONS
Over-the-counter Tylenol/Motrin as needed for pain.  May also take tramadol as needed.  Call the orthopedist in a few days to arrange a follow-up appointment.

## 2025-02-10 ENCOUNTER — APPOINTMENT (OUTPATIENT)
Dept: MRI IMAGING | Facility: HOSPITAL | Age: 45
End: 2025-02-10
Attending: EMERGENCY MEDICINE
Payer: OTHER MISCELLANEOUS

## 2025-02-10 ENCOUNTER — HOSPITAL ENCOUNTER (OUTPATIENT)
Facility: HOSPITAL | Age: 45
Setting detail: OBSERVATION
Discharge: HOME | End: 2025-02-13
Attending: EMERGENCY MEDICINE | Admitting: HOSPITALIST
Payer: OTHER MISCELLANEOUS

## 2025-02-10 DIAGNOSIS — M54.32 SCIATICA OF LEFT SIDE: Primary | ICD-10-CM

## 2025-02-10 DIAGNOSIS — M51.26 LUMBAR DISC HERNIATION: ICD-10-CM

## 2025-02-10 LAB
ANION GAP SERPL CALC-SCNC: 6 MMOL/L (ref 0–18)
BASOPHILS # BLD AUTO: 0.06 X10(3) UL (ref 0–0.2)
BASOPHILS NFR BLD AUTO: 0.9 %
BUN BLD-MCNC: 16 MG/DL (ref 9–23)
CALCIUM BLD-MCNC: 8.8 MG/DL (ref 8.7–10.6)
CHLORIDE SERPL-SCNC: 105 MMOL/L (ref 98–112)
CO2 SERPL-SCNC: 28 MMOL/L (ref 21–32)
CREAT BLD-MCNC: 0.65 MG/DL
CRP SERPL-MCNC: <0.4 MG/DL (ref ?–0.5)
EGFRCR SERPLBLD CKD-EPI 2021: 111 ML/MIN/1.73M2 (ref 60–?)
EOSINOPHIL # BLD AUTO: 0.19 X10(3) UL (ref 0–0.7)
EOSINOPHIL NFR BLD AUTO: 3 %
ERYTHROCYTE [DISTWIDTH] IN BLOOD BY AUTOMATED COUNT: 12.3 %
ERYTHROCYTE [SEDIMENTATION RATE] IN BLOOD: 10 MM/HR
GLUCOSE BLD-MCNC: 84 MG/DL (ref 70–99)
HCT VFR BLD AUTO: 33.4 %
HGB BLD-MCNC: 11.7 G/DL
IMM GRANULOCYTES # BLD AUTO: 0.01 X10(3) UL (ref 0–1)
IMM GRANULOCYTES NFR BLD: 0.2 %
LYMPHOCYTES # BLD AUTO: 1.94 X10(3) UL (ref 1–4)
LYMPHOCYTES NFR BLD AUTO: 30.6 %
MCH RBC QN AUTO: 32.3 PG (ref 26–34)
MCHC RBC AUTO-ENTMCNC: 35 G/DL (ref 31–37)
MCV RBC AUTO: 92.3 FL
MONOCYTES # BLD AUTO: 0.5 X10(3) UL (ref 0.1–1)
MONOCYTES NFR BLD AUTO: 7.9 %
NEUTROPHILS # BLD AUTO: 3.65 X10 (3) UL (ref 1.5–7.7)
NEUTROPHILS # BLD AUTO: 3.65 X10(3) UL (ref 1.5–7.7)
NEUTROPHILS NFR BLD AUTO: 57.4 %
OSMOLALITY SERPL CALC.SUM OF ELEC: 288 MOSM/KG (ref 275–295)
PLATELET # BLD AUTO: 297 10(3)UL (ref 150–450)
POTASSIUM SERPL-SCNC: 4.1 MMOL/L (ref 3.5–5.1)
RBC # BLD AUTO: 3.62 X10(6)UL
SODIUM SERPL-SCNC: 139 MMOL/L (ref 136–145)
WBC # BLD AUTO: 6.4 X10(3) UL (ref 4–11)

## 2025-02-10 PROCEDURE — 99223 1ST HOSP IP/OBS HIGH 75: CPT | Performed by: HOSPITALIST

## 2025-02-10 PROCEDURE — 72158 MRI LUMBAR SPINE W/O & W/DYE: CPT | Performed by: EMERGENCY MEDICINE

## 2025-02-10 RX ORDER — SENNOSIDES 8.6 MG
17.2 TABLET ORAL NIGHTLY PRN
Status: DISCONTINUED | OUTPATIENT
Start: 2025-02-10 | End: 2025-02-13

## 2025-02-10 RX ORDER — FAMOTIDINE 20 MG/1
20 TABLET, FILM COATED ORAL 2 TIMES DAILY
Status: DISCONTINUED | OUTPATIENT
Start: 2025-02-10 | End: 2025-02-13

## 2025-02-10 RX ORDER — HYDROMORPHONE HYDROCHLORIDE 1 MG/ML
1 INJECTION, SOLUTION INTRAMUSCULAR; INTRAVENOUS; SUBCUTANEOUS ONCE
Status: COMPLETED | OUTPATIENT
Start: 2025-02-10 | End: 2025-02-10

## 2025-02-10 RX ORDER — GABAPENTIN 100 MG/1
200 CAPSULE ORAL 3 TIMES DAILY
Status: DISCONTINUED | OUTPATIENT
Start: 2025-02-10 | End: 2025-02-13

## 2025-02-10 RX ORDER — HYDROMORPHONE HYDROCHLORIDE 1 MG/ML
0.5 INJECTION, SOLUTION INTRAMUSCULAR; INTRAVENOUS; SUBCUTANEOUS ONCE
Status: COMPLETED | OUTPATIENT
Start: 2025-02-10 | End: 2025-02-10

## 2025-02-10 RX ORDER — ACETAMINOPHEN 500 MG
500 TABLET ORAL EVERY 4 HOURS PRN
Status: DISCONTINUED | OUTPATIENT
Start: 2025-02-10 | End: 2025-02-11

## 2025-02-10 RX ORDER — SODIUM PHOSPHATE, DIBASIC AND SODIUM PHOSPHATE, MONOBASIC 7; 19 G/230ML; G/230ML
1 ENEMA RECTAL ONCE AS NEEDED
Status: DISCONTINUED | OUTPATIENT
Start: 2025-02-10 | End: 2025-02-13

## 2025-02-10 RX ORDER — ORPHENADRINE CITRATE 30 MG/ML
60 INJECTION INTRAMUSCULAR; INTRAVENOUS ONCE
Status: COMPLETED | OUTPATIENT
Start: 2025-02-10 | End: 2025-02-10

## 2025-02-10 RX ORDER — MORPHINE SULFATE 2 MG/ML
1 INJECTION, SOLUTION INTRAMUSCULAR; INTRAVENOUS EVERY 2 HOUR PRN
Status: DISCONTINUED | OUTPATIENT
Start: 2025-02-10 | End: 2025-02-11

## 2025-02-10 RX ORDER — POLYETHYLENE GLYCOL 3350 17 G/17G
17 POWDER, FOR SOLUTION ORAL DAILY PRN
Status: DISCONTINUED | OUTPATIENT
Start: 2025-02-10 | End: 2025-02-11

## 2025-02-10 RX ORDER — IBUPROFEN 600 MG/1
600 TABLET, FILM COATED ORAL 3 TIMES DAILY
Status: DISCONTINUED | OUTPATIENT
Start: 2025-02-10 | End: 2025-02-11

## 2025-02-10 RX ORDER — MORPHINE SULFATE 4 MG/ML
4 INJECTION, SOLUTION INTRAMUSCULAR; INTRAVENOUS EVERY 2 HOUR PRN
Status: DISCONTINUED | OUTPATIENT
Start: 2025-02-10 | End: 2025-02-13

## 2025-02-10 RX ORDER — CYCLOBENZAPRINE HYDROCHLORIDE 30 MG/1
30 CAPSULE, EXTENDED RELEASE ORAL
COMMUNITY
End: 2025-02-13

## 2025-02-10 RX ORDER — KETOROLAC TROMETHAMINE 15 MG/ML
15 INJECTION, SOLUTION INTRAMUSCULAR; INTRAVENOUS ONCE
Status: COMPLETED | OUTPATIENT
Start: 2025-02-10 | End: 2025-02-10

## 2025-02-10 RX ORDER — KETOROLAC TROMETHAMINE 15 MG/ML
15 INJECTION, SOLUTION INTRAMUSCULAR; INTRAVENOUS EVERY 6 HOURS PRN
Status: DISCONTINUED | OUTPATIENT
Start: 2025-02-10 | End: 2025-02-11

## 2025-02-10 RX ORDER — MORPHINE SULFATE 2 MG/ML
2 INJECTION, SOLUTION INTRAMUSCULAR; INTRAVENOUS EVERY 2 HOUR PRN
Status: DISCONTINUED | OUTPATIENT
Start: 2025-02-10 | End: 2025-02-11

## 2025-02-10 RX ORDER — ACETAMINOPHEN 325 MG/1
650 TABLET ORAL EVERY 6 HOURS
Status: DISCONTINUED | OUTPATIENT
Start: 2025-02-10 | End: 2025-02-11

## 2025-02-10 RX ORDER — PROCHLORPERAZINE EDISYLATE 5 MG/ML
5 INJECTION INTRAMUSCULAR; INTRAVENOUS EVERY 8 HOURS PRN
Status: DISCONTINUED | OUTPATIENT
Start: 2025-02-10 | End: 2025-02-13

## 2025-02-10 RX ORDER — ONDANSETRON 2 MG/ML
4 INJECTION INTRAMUSCULAR; INTRAVENOUS EVERY 6 HOURS PRN
Status: DISCONTINUED | OUTPATIENT
Start: 2025-02-10 | End: 2025-02-13

## 2025-02-10 RX ORDER — DIPHENHYDRAMINE HYDROCHLORIDE 50 MG/ML
10 INJECTION, SOLUTION INTRAMUSCULAR; INTRAVENOUS
Status: COMPLETED | OUTPATIENT
Start: 2025-02-10 | End: 2025-02-10

## 2025-02-10 RX ORDER — HEPARIN SODIUM 5000 [USP'U]/ML
5000 INJECTION, SOLUTION INTRAVENOUS; SUBCUTANEOUS EVERY 12 HOURS SCHEDULED
Status: DISCONTINUED | OUTPATIENT
Start: 2025-02-10 | End: 2025-02-13

## 2025-02-10 RX ORDER — HYDROCODONE BITARTRATE AND ACETAMINOPHEN 5; 325 MG/1; MG/1
1 TABLET ORAL EVERY 6 HOURS PRN
Status: DISCONTINUED | OUTPATIENT
Start: 2025-02-10 | End: 2025-02-11

## 2025-02-10 RX ORDER — METHOCARBAMOL 100 MG/ML
1000 INJECTION, SOLUTION INTRAMUSCULAR; INTRAVENOUS ONCE
Status: COMPLETED | OUTPATIENT
Start: 2025-02-10 | End: 2025-02-10

## 2025-02-10 RX ORDER — BISACODYL 10 MG
10 SUPPOSITORY, RECTAL RECTAL
Status: DISCONTINUED | OUTPATIENT
Start: 2025-02-10 | End: 2025-02-13

## 2025-02-10 NOTE — ED QUICK NOTES
Patient fell 12/15 + left hip and lower back pain, patient continued to work, was unable to see a provider.  Did go to urgent care in January, had an MRI after, MRI results came back 2/3.    Today patient has had increased pain, tingling, and spasms.    Patient had incontinence of urine in December after the fall, this was resolved and came back yesterday and today.  Reports she feels the urge to go, and then is unable to hold her urine.

## 2025-02-10 NOTE — ED INITIAL ASSESSMENT (HPI)
Pt to ER with complaints of injury at work.on dec 15th. Reports mri showing herniated disc. Pt reports past week it has been worst. Pt reports the past 2x days she has been urinating herself. Ambulated into ER. Reports new numbness in left leg today.

## 2025-02-11 ENCOUNTER — APPOINTMENT (OUTPATIENT)
Dept: CT IMAGING | Facility: HOSPITAL | Age: 45
End: 2025-02-11
Attending: HOSPITALIST
Payer: OTHER MISCELLANEOUS

## 2025-02-11 ENCOUNTER — TELEPHONE (OUTPATIENT)
Dept: PAIN CLINIC | Facility: CLINIC | Age: 45
End: 2025-02-11

## 2025-02-11 DIAGNOSIS — M54.16 LUMBAR RADICULITIS: Primary | ICD-10-CM

## 2025-02-11 LAB
BILIRUB UR QL STRIP.AUTO: NEGATIVE
COLOR UR AUTO: YELLOW
GLUCOSE UR STRIP.AUTO-MCNC: NORMAL MG/DL
KETONES UR STRIP.AUTO-MCNC: NEGATIVE MG/DL
LEUKOCYTE ESTERASE UR QL STRIP.AUTO: NEGATIVE
NITRITE UR QL STRIP.AUTO: NEGATIVE
PH UR STRIP.AUTO: 7 [PH] (ref 5–8)
PROT UR STRIP.AUTO-MCNC: 20 MG/DL
SP GR UR STRIP.AUTO: 1.03 (ref 1–1.03)
UROBILINOGEN UR STRIP.AUTO-MCNC: NORMAL MG/DL

## 2025-02-11 PROCEDURE — 99233 SBSQ HOSP IP/OBS HIGH 50: CPT | Performed by: HOSPITALIST

## 2025-02-11 PROCEDURE — 99223 1ST HOSP IP/OBS HIGH 75: CPT | Performed by: ANESTHESIOLOGY

## 2025-02-11 PROCEDURE — 72192 CT PELVIS W/O DYE: CPT | Performed by: HOSPITALIST

## 2025-02-11 PROCEDURE — 99221 1ST HOSP IP/OBS SF/LOW 40: CPT | Performed by: NEUROLOGICAL SURGERY

## 2025-02-11 RX ORDER — DOCUSATE SODIUM 100 MG/1
100 CAPSULE, LIQUID FILLED ORAL 2 TIMES DAILY
Status: DISCONTINUED | OUTPATIENT
Start: 2025-02-11 | End: 2025-02-13

## 2025-02-11 RX ORDER — OXYCODONE HYDROCHLORIDE 10 MG/1
10 TABLET ORAL EVERY 4 HOURS PRN
Status: DISCONTINUED | OUTPATIENT
Start: 2025-02-11 | End: 2025-02-13

## 2025-02-11 RX ORDER — POLYETHYLENE GLYCOL 3350 17 G/17G
17 POWDER, FOR SOLUTION ORAL DAILY
Status: DISCONTINUED | OUTPATIENT
Start: 2025-02-11 | End: 2025-02-13

## 2025-02-11 RX ORDER — OXYCODONE HYDROCHLORIDE 5 MG/1
5 TABLET ORAL EVERY 4 HOURS PRN
Status: DISCONTINUED | OUTPATIENT
Start: 2025-02-11 | End: 2025-02-13

## 2025-02-11 RX ORDER — ACETAMINOPHEN 500 MG
1000 TABLET ORAL EVERY 6 HOURS
Status: DISCONTINUED | OUTPATIENT
Start: 2025-02-11 | End: 2025-02-13

## 2025-02-11 NOTE — ED QUICK NOTES
Orders for admission, patient is aware of plan and ready to go upstairs. Any questions, please call ED RN Sri at extension [phone].     Patient Covid vaccination status: Fully vaccinated     COVID Test Ordered in ED: None    COVID Suspicion at Admission: N/A    Running Infusions:  None    Mental Status/LOC at time of transport: A&OX3 with no neuro deficits noted at this time. 18g IV to lac, medications given.    Other pertinent information:   CIWA score: N/A   NIH score:  N/A

## 2025-02-11 NOTE — PLAN OF CARE
NURSING ADMISSION NOTE      Patient admitted via Cart  Oriented to room.  Safety precautions initiated.  Bed in low position.  Call light in reach.    Skin assessment with Kim PCT, no skin issues noted on exam

## 2025-02-11 NOTE — PHYSICAL THERAPY NOTE
PT orders received. Chart reviewed. Pt with CT of pelvis pending. Will await completion of imaging with results received and updated POC. RN aware and in agreement.

## 2025-02-11 NOTE — CM/SW NOTE
02/11/25 1600   CM/SW Referral Data   Referral Source Social Work (self-referral)   Reason for Referral Other  (SDOH)   Informant Patient;EMR;Clinical Staff Member   Patient Info   Patient's Current Mental Status at Time of Assessment Alert;Oriented   Patient Status Prior to Admission   Independent with ADLs and Mobility Yes   Discharge Needs   Anticipated D/C needs To be determined       Patient is a 45 y/o woman admitted with sciatica and back pain.  Order received for SDOH due to concerns about housing/food/utilities.  Met with pt who stated that she was originally injured in 12/24 at work.  A worker's comp case has been filed but her employer has not provided claim details and she has not been contacted by an .  She has retained a  to assist.  She does have BC/BS health insurance.  She had been living with her daughters (ages 20, 16) and her older dtr's boyfriend.  The lease ended on 1/31 and could not be renewed.  At this time, pt's 21 y/o dtr has found a one bedroom apt and pt's 15 y/o dtr is staying with her.  Pt is able to stay there for a few days after discharge, but cannot stay beyond that time.  Plan to provide patient with additional housing resources, including shelter programs to assist pt in finding long term housing.  Pt tearful during conversation and emotional support was provided.  / to remain available for support and/or discharge planning.     Gay Lux, [facility]  Discharge Planner  [phone]

## 2025-02-11 NOTE — CONSULTS
Name: Miriam Jacome   : 1980   DOS: 2025     Chief complaint: Low back      History of present illness:  Miriam Jacome is a 44 year old female with a history of bipolar disorder, Crohn's disease who was admitted due to intractable low back pain.  The patient reports a fall at work.  Has been having difficulty with Worker's Compensation.  Therefore, seen in the ED and admitted due to intractable low back pain.  Complains of discomfort across the back with radiation down the left leg.  There is some paresthesia and numbness.  Was seen by neurosurgery and no acute intervention is necessary.  Pain service consulted to discuss epidural injection.    She denies any chills, fever or weakness. She denies any bladder or bowel incontinence.      Past Medical History:    Bipolar affective (HCC)    Crohn disease (HCC)    Diverticulitis    Esophageal reflux    Hiatal hernia    Kidney stones    Ulcerative colitis (HCC)      No current outpatient medications on file.     Past Surgical History:   Procedure Laterality Date    Appendectomy      Appendectomy         &     Colonoscopy  2017    Colonoscopy N/A 2020    Procedure: COLONOSCOPY;  Surgeon: Lane Cole MD;  Location:  ENDOSCOPY    D & c      Lithotripsy  2008    nephrostomy tube removed    Other      benign bilateral breast biopsy    Other  2007    Nephrostomy tube insertion left kidney-removed     Other surgical history  2007    nephroscopy with tube insertion    Other surgical history  2008    kidney tube insertion removed    Other surgical history  2008    lithotripsy    Other surgical history      ashlie breast bxs \"neg\"    Other surgical history Left 2015    Patellofemoral ligament reconstruction and tibial tubercle transfer    Removal gallbladder      Spine surgery procedure unlisted      cervical spine discectomy      Family History   Problem Relation Age of Onset    Heart Disease Mother     Cancer Maternal  Grandmother     Stroke Sister      Social History     Socioeconomic History    Marital status:    Tobacco Use    Smoking status: Former     Current packs/day: 0.00     Average packs/day: 0.5 packs/day for 10.0 years (5.0 ttl pk-yrs)     Types: Cigarettes     Start date: 1991     Quit date: 2001     Years since quittin.2     Passive exposure: Past    Smokeless tobacco: Former     Quit date: 1994   Vaping Use    Vaping status: Former   Substance and Sexual Activity    Alcohol use: Not Currently     Comment: no drinking per patient    Drug use: Yes     Frequency: 3.0 times per week     Types: Cannabis     Comment: smoked yesterday   Other Topics Concern    Caffeine Concern No    Exercise Yes     Comment: work     Social Drivers of Health     Food Insecurity: Food Insecurity Present (2/10/2025)    NCSS - Food Insecurity     Worried About Running Out of Food in the Last Year: Yes     Ran Out of Food in the Last Year: No   Transportation Needs: No Transportation Needs (2/10/2025)    NCSS - Transportation     Lack of Transportation: No   Housing Stability: At Risk (2/10/2025)    NCSS - Housing/Utilities     Has Housing: No     Worried About Losing Housing: Yes     Unable to Get Utilities: Yes       Review of  other systems:  10 point ROS otherwise negative     physical examination: Miriam is a 44 year old female not in acute distress  /70 (BP Location: Right arm)   Pulse 70   Temp 98.9 °F (37.2 °C) (Oral)   Resp 18   Ht 62\"   Wt 130 lb (59 kg)   LMP 2025 (Exact Date)   SpO2 97%   BMI 23.78 kg/m²    The patient is awake, alert, oriented and corporative. She has a normal affect. The patient is resting in hospital bed in no acute distress   HEENT: No gross lesion noted. PEERL. No icterus.  Neck and Upper Extremity: Supple. No thyromegaly or lymphadenopathy.   Motor Examination:    (R)   (L)  Deltoid:      5    5  Biceps:       5    5  Triceps:      5    5  Wrist Extension:      5    5  Wrist Flexsion:     5    5  Finger Extension:     5    5  Finger Flexsion:     5    5       Cardiovascular system: Regular rate and rhythm.    Respiratory: Speech is nonlabored  Abdomen: Soft,    Neurologic:  Cranial nerves II through XII are grossly intact.       Examination of the lower back:   Motor Examination:   (R)   (L)   Hip Abduction:   5    5   Hip Flexion:    5    5   Knee Extension:   5    5   Knee Flexion:    5    5   Ant. Tibialis:    5    5  Extensor Hallucis Longus:   5    5  Peroneals:     5    5  Gastrocsoleus:     5    5       Radiology diagnostic studies:   CT pelvis reviewed with well-preserved hip joint space  Lumbar MRI reviewed with moderate disc bulge at L4-5 and L5-S1.  There is no significant central canal or neuroforaminal stenosis    Assessment:  Intractable low back pain      Plan:     The patient is a 44-year-old female admitted due to intractable low back pain.  Complains of pain across the back with radiation down both legs, left worse than right.   Reviewed imaging and neurosurgical consultation notes.  No acute neurosurgical issues.  Discussed role for lumbar epidural steroid injection for symptom management.  Patient voiced understanding would like to move forward.  Will add on for Thursday a.m.      Robbie Bryan MD MPH  Pain Management

## 2025-02-11 NOTE — OCCUPATIONAL THERAPY NOTE
OCCUPATIONAL THERAPY ORTHO EVALUATION - INPATIENT     Room Number: 366/366-A  Evaluation Date: 2/11/2025  Type of Evaluation: Initial  Presenting Problem: sciatica of L side    Physician Order: IP Consult to Occupational Therapy  Reason for Therapy: ADL/IADL Dysfunction and Discharge Planning    OCCUPATIONAL THERAPY ASSESSMENT   Patient is currently functioning near baseline with toileting, bathing, upper body dressing, lower body dressing, and bed mobility. Prior to admission, patient's baseline is independent.  Patient is requiring minimum assistance as a result of the following impairments: pain. Occupational Therapy will continue to follow for duration of hospitalization.    Patient will benefit from continued skilled OT Services at discharge to promote prior level of function.  Anticipate patient will return home with home health OT    History Related to Current Admission: Patient is a 44 year old female admitted on 2/10/2025 with Presenting Problem: sciatica of L side. Co-Morbidities : bipolar d/o, GERD, Crohn's    Recommendations for nursing staff:   Transfers: supervision with RW  Toileting location: toilet     EVALUATION SESSION    Patient Start of Session: Pt was found in her bathroom after having toileted independently.     FUNCTIONAL TRANSFER ASSESSMENT  Sit to Stand: Edge of Bed; Chair  Edge of Bed: Supervision  Chair: Supervision  Toilet Transfer: Modified Independent    BED MOBILITY  Supine to Sit : Minimal Assist  Sit to Supine (OT): Supervision    BALANCE ASSESSMENT     FUNCTIONAL ADL ASSESSMENT  Toileting Seated: Modified Independent  Toileting Standing: Modified Independent    ACTIVITY TOLERANCE:                          O2 SATURATIONS       Cognition  WFL    Upper extremity  WFL    EDUCATION PROVIDED  Patient Education : Role of Occupational Therapy; Plan of Care; Discharge Recommendations; Functional Transfer Techniques; Fall Prevention; Posture/Positioning; Proper Body Mechanics  Patient's  Response to Education: Verbalized Understanding    Equipment used: RW, gait belt   Demonstrates functional use, Would benefit from additional trial     Therapist comments: up in bathroom toileting independently>pt left RW in bathroom and was able to walk to sit EOB>supine>sitting in bed to obtain PLOF>sit via log rolling>stand to RW>walk in hallway with 3 distinct gait patterns noted including: up on R ball of foot, up on L ball of foot and then slowly dragging L foot underneath her>sitting in chair, pt reclined and provided hot pack for comfort     Patient End of Session: Up in chair;Needs met;Call light within reach;RN aware of session/findings;Hospital anti-slip socks;Discussed recommendations with /    OCCUPATIONAL PROFILE    HOME SITUATION  Type of Home: Other (Comment) (homeless)     Lives With: Alone               Occupation/Status: unemployed- was working at [employer] as a  or   Hand Dominance: Right  Drives: Yes  Patient Regularly Uses: Glasses    Prior Level of Function: Pt is typically independent. Pt is currently unemployed after reporting that she was injured in Dec. Pt was attending OP PT. Pt lost her townhouse due to not having proof of income. Most recently, pt was staying in a hotel. Pt has a boyfriend, 16 & 20 year old children.     SUBJECTIVE   \"I brought my walker into the bathroom with me but I left it in there because I didn't think it would fit on the way out.\"   \"As long as my 3 cats and my 16 year old have somewhere to stay, I am  happy.\"  \"I was providing for my boyfriend and 20 year old daughter and now that I can't provide for them, they feel they don't have to provide for me.\" Pt tearful, support given     PAIN ASSESSMENT  Rating: Unable to rate  Location: L LE/back  Management Techniques: Heat;Relaxation;Repositioning    OBJECTIVE  Precautions: Spine  Fall Risk: Standard fall risk    WEIGHT BEARING RESTRICTION       ASSESSMENTS    AM-PAC '6-Clicks'  Inpatient Daily Activity Short Form  -   Putting on and taking off regular lower body clothing?: A Little  -   Bathing (including washing, rinsing, drying)?: A Little  -   Toileting, which includes using toilet, bedpan or urinal? : A Little  -   Putting on and taking off regular upper body clothing?: A Little  -   Taking care of personal grooming such as brushing teeth?: None  -   Eating meals?: None    AM-PAC Score:  Score: 20  Approx Degree of Impairment: 38.32%  Standardized Score (AM-PAC Scale): 42.03    PLAN  OT Device Recommendations: TBD  OT Treatment Plan: Balance activities;Energy conservation/work simplification techniques;ADL training;Functional transfer training;Patient/Family education;Patient/Family training;Equipment eval/education;Compensatory technique education;Continued evaluation  Rehab Potential : Fair  Frequency: 3x/week  Number of Visits to Meet Established Goals: 3    ADL Goals  Patient will perform toileting with supervision and AE PRN  Patient will perform LB dressing with supervision and AE PRN    Functional Transfer Goals  Patient will perform bed mobility supine to sit with supervision  Patient will perform bed mobility sit to supine with supervision  Patient will perform toilet transfer with supervision    Additional Goals:  Patient will state spine precautions and maintain during ADL      Patient Evaluation Complexity Level:   Occupational Profile/Medical History LOW - Brief history including review of medical or therapy records    Specific performance deficits impacting engagement in ADL/IADL LOW  1 - 3 performance deficits    Client Assessment/Performance Deficits LOW - No comorbidities nor modifications of tasks    Clinical Decision Making LOW - Analysis of occupational profile, problem-focused assessments, limited treatment options    Overall Complexity LOW     OT Session Time: 30 minutes  Therapeutic Activity: 15 minutes

## 2025-02-11 NOTE — PROGRESS NOTES
2678: Dr Pabon was here and said that pt needs an orthopedic consult for her hip issues. And that she may also need pain service for an injection. Pepper's PA asked him if he wanted her to reach out to Dr Bryan and see if he would come do an injection, but dr Pabon said not until an orthopedic checks out her hip. Pt it on neurontin, motrin, tylenol scheduled. prn MSO4, and prn Norco 5 1 q 6hs. I did not give her the scheduled tylenol erick she took the norco. pt c/o severe spasms as well. She had a R for flexeril as an outpt, but stated that didn't do a whol lot for her. Wondering about trying robaxin 500 mg? And mayby increasing the norco to 1 q 4 hrs? What do you think?Message sent to Dr Rivers to ask for an ortho consult

## 2025-02-11 NOTE — H&P
[facility]     Miriam Jacome Patient Status:  Emergency    1980 MRN [id number]   Location [facility] EMERGENCY DEPARTMENT Attending Jorge Lundberg MD   Hosp Day # 0 PCP None Pcp     Chief Complaint: back pain    Subjective:    History of Present Illness:     Miriam Jacome is a 44 year old female with past medical history bipolar disorder, Crohn's disease, GERD presents the hospital today for intractable back pain.  Patient had a fall back in 2024 at work and is having issues with workmen's comp getting her the meds she needs to manage pain at home.  She does states she had an MRI as an outpatient that showed \"some herniated disc\".  Over the last couple days pain is beginning to get worse with some concerns of paresthesia/numbness coming down her legs.  She had MRI here in the hospital did not show any acute compression.  Patient however has required multiple dosages of IV Dilaudid, IV Toradol with intractable pain.  When I saw the patient in the emergency room she was in tears and says the pain is unbearable.  She denies any fevers or chills no nausea no vomiting no diarrhea no constipation.        History/Other:    Past Medical History:  Past Medical History:    Bipolar affective (HCC)    Crohn disease (HCC)    Diverticulitis    Esophageal reflux    Hiatal hernia    Kidney stones    Ulcerative colitis (HCC)     Past Surgical History:   Past Surgical History:   Procedure Laterality Date    Appendectomy      Appendectomy        2004 & 2008    Colonoscopy  2017    Colonoscopy N/A 2020    Procedure: COLONOSCOPY;  Surgeon: Lane Cole MD;  Location:  ENDOSCOPY    D & c      Lithotripsy  2008    nephrostomy tube removed    Other      benign bilateral breast biopsy    Other  2007    Nephrostomy tube insertion left kidney-removed 2008    Other surgical history  2007    nephroscopy with tube insertion    Other surgical history       kidney tube insertion removed    Other surgical history  2008    lithotripsy    Other surgical history      ashlie breast bxs \"neg\"    Other surgical history Left 11/2015    Patellofemoral ligament reconstruction and tibial tubercle transfer    Removal gallbladder      Spine surgery procedure unlisted  2014    cervical spine discectomy      Family History:   Family History   Problem Relation Age of Onset    Heart Disease Mother     Cancer Maternal Grandmother     Stroke Sister      Social History:    reports that she quit smoking about 23 years ago. Her smoking use included cigarettes. She started smoking about 33 years ago. She has a 5 pack-year smoking history. She has been exposed to tobacco smoke. She quit smokeless tobacco use about 30 years ago. She reports that she does not currently use alcohol. She reports current drug use. Frequency: 3.00 times per week. Drug: Cannabis.     Allergies: Allergies[1]    Medications:  Medications Ordered Prior to Encounter[2]    Review of Systems:   A comprehensive review of systems was completed.    Pertinent positives and negatives noted in the HPI.    Objective:   Physical Exam:    /70   Pulse 72   Temp 98.1 °F (36.7 °C) (Temporal)   Resp 18   Ht 5' 2\" (1.575 m)   Wt 130 lb (59 kg)   SpO2 100%   BMI 23.78 kg/m²   General: No acute distress, Alert  Respiratory: No rhonchi, no wheezes  Cardiovascular: S1, S2. Regular rate and rhythm  Abdomen: Soft, Non-tender, non-distended, positive bowel sounds  Neuro: No new focal deficits  Extremities: No edema      Results:    Labs:      Labs Last 24 Hours:    Recent Labs   Lab 02/10/25  1402   RBC 3.62*   HGB 11.7*   HCT 33.4*   MCV 92.3   MCH 32.3   MCHC 35.0   RDW 12.3   NEPRELIM 3.65   WBC 6.4   .0       Recent Labs   Lab 02/10/25  1402   GLU 84   BUN 16   CREATSERUM 0.65   EGFRCR 111   CA 8.8      K 4.1      CO2 28.0       Estimated Glomerular Filtration Rate: 110.8 mL/min/1.73m2 (by CKD-EPI based on SCr  of 0.65 mg/dL).    No results found for: \"PT\", \"INR\"    No results for input(s): \"TROP\", \"TROPHS\", \"CK\" in the last 168 hours.    No results for input(s): \"TROP\", \"PBNP\" in the last 168 hours.    No results for input(s): \"PCT\" in the last 168 hours.    Imaging: Imaging data reviewed in Epic.    Assessment & Plan:      #Intractable back pain  #Disc protrusions at L4-L5 without central canal or foraminal stenosis  -Multimodal pain control including scheduled Tylenol/NSAIDs/gabapentin, as needed Norco, Toradol, IV morphine  -MRI lumbar spine (2/10) reviewed  -PT/OT to see    #Elevated blood pressure  -Will monitor, no history of hypertension    #Bipolar disorder  #GERD    #Ulcerative colitis        Plan of care discussed with er physician & patient    Alee Miller DO    Supplementary Documentation:     The 21st Century Cures Act makes medical notes like these available to patients in the interest of transparency. Please be advised this is a medical document. Medical documents are intended to carry relevant information, facts as evident, and the clinical opinion of the practitioner. The medical note is intended as peer to peer communication and may appear blunt or direct. It is written in medical language and may contain abbreviations or verbiage that are unfamiliar.                                       [1]   Allergies  Allergen Reactions    Dairy Products ANAPHYLAXIS    Gluten Flour DIARRHEA and NAUSEA AND VOMITING   [2]   Current Facility-Administered Medications on File Prior to Encounter   Medication Dose Route Frequency Provider Last Rate Last Admin    [COMPLETED] acetaminophen (Tylenol Extra Strength) tab 1,000 mg  1,000 mg Oral Once Vince Butler APRN   1,000 mg at 01/18/25 1040     Current Outpatient Medications on File Prior to Encounter   Medication Sig Dispense Refill    Cyclobenzaprine HCl ER 30 MG Oral Capsule SR 24 Hr Take 1 capsule (30 mg total) by mouth daily as needed for Muscle spasms.       traMADol 50 MG Oral Tab Take 1 tablet (50 mg total) by mouth every 8 (eight) hours as needed for Pain. 10 tablet 0    ibuprofen 600 MG Oral Tab Take 1 tablet (600 mg total) by mouth every 6 (six) hours as needed for Pain.      diphenhydrAMINE 25 MG Oral Cap Take 1 capsule (25 mg total) by mouth every 6 (six) hours as needed. 20 capsule 0

## 2025-02-11 NOTE — CONSULTS
[facility]  Neurosurgery Consult  2025    Miriam Jacome Patient Status:  Observation    1980 MRN [id number]   Location [facility] 3SW-A Attending Haroon Rivers MD   Hosp Day # 0 PCP None Pcp     REASON FOR CONSULT:    Back and leg pain    HISTORY OF PRESENT ILLNESS:  Miriam Jacome is a(n) 44 year old female who states she fell at work back in December.  Patient had back and leg pain since then.  She got MRIs of her hip and back.  She is having hard time controlling her pain.  She having a bad day yesterday.  She was having a bunch of spasms.  She presented to the ER.  She is having issues with Worker's Comp. getting her seen and getting her medications.    REVIEW OF SYSTEMS:  The 12 point checklist was reviewed and was negative except: See above    ALLERGIES:  Allergies[1]    MEDICATIONS:  Prescriptions Prior to Admission[2]    HISTORY:  Past Medical History:    Bipolar affective (HCC)    Crohn disease (HCC)    Diverticulitis    Esophageal reflux    Hiatal hernia    Kidney stones    Ulcerative colitis (HCC)     Past Surgical History:   Procedure Laterality Date    Appendectomy      Appendectomy         &     Colonoscopy  2017    Colonoscopy N/A 2020    Procedure: COLONOSCOPY;  Surgeon: Lane Cole MD;  Location:  ENDOSCOPY    D & c      Lithotripsy  2008    nephrostomy tube removed    Other      benign bilateral breast biopsy    Other  2007    Nephrostomy tube insertion left kidney-removed     Other surgical history  2007    nephroscopy with tube insertion    Other surgical history  2008    kidney tube insertion removed    Other surgical history  2008    lithotripsy    Other surgical history      ashlie breast bxs \"neg\"    Other surgical history Left 2015    Patellofemoral ligament reconstruction and tibial tubercle transfer    Removal gallbladder      Spine surgery procedure unlisted      cervical spine discectomy     Family History   Problem  Relation Age of Onset    Heart Disease Mother     Cancer Maternal Grandmother     Stroke Sister      Miriam  reports that she quit smoking about 23 years ago. Her smoking use included cigarettes. She started smoking about 33 years ago. She has a 5 pack-year smoking history. She has been exposed to tobacco smoke. She quit smokeless tobacco use about 30 years ago. She reports that she does not currently use alcohol. She reports current drug use. Frequency: 3.00 times per week. Drug: Cannabis.    PHYSICAL EXAMINATION:  Vital Signs:  /69 (BP Location: Right arm)   Pulse 68   Temp 98.9 °F (37.2 °C) (Oral)   Resp 18   Ht 62\"   Wt 130 lb (59 kg)   LMP 02/03/2025 (Exact Date)   SpO2 97%   BMI 23.78 kg/m²   She is sitting up in bed  She is a little tearful  She is complaining of pain in her leg  On examination she has breakaway strength in her left lower extremity  Right lower extremities pretty good    REVIEW OF STUDIES:  MRI lumbar spine shows mild disc bulges at L4-5 and L5-S1  She reports she had an MRI of her hip which showed some damage    ASSESSMENT AND PLAN:  44-year-old female with low back and leg pain  At this point I do not have a good surgical target explaining her symptoms  She is got pain weakness in multiple dermatomes  I do think it is reasonable to image her hip  Also reasonable to have pain management evaluation, she is post to get that as an outpatient but not had it yet  No acute neurosurgical intervention needed  Reviewed films in detail  All questions were answered  Patient appreciative  Spoke with hospitalist  Please call with any questions or concerns        Damion Pabon MD   [facility]  2/11/2025  10:11 AM   Dictated but not proofread       [1]   Allergies  Allergen Reactions    Dairy Products ANAPHYLAXIS    Gluten Flour DIARRHEA and NAUSEA AND VOMITING   [2]   Medications Prior to Admission   Medication Sig Dispense Refill Last Dose/Taking    Cyclobenzaprine  HCl ER 30 MG Oral Capsule SR 24 Hr Take 1 capsule (30 mg total) by mouth daily as needed for Muscle spasms.   2/6/2025    ibuprofen 600 MG Oral Tab Take 1 tablet (600 mg total) by mouth every 6 (six) hours as needed for Pain.   Taking As Needed    traMADol 50 MG Oral Tab Take 1 tablet (50 mg total) by mouth every 8 (eight) hours as needed for Pain. 10 tablet 0 2/10/2025 at  7:00 AM    diphenhydrAMINE 25 MG Oral Cap Take 1 capsule (25 mg total) by mouth every 6 (six) hours as needed. 20 capsule 0

## 2025-02-11 NOTE — PHYSICAL THERAPY NOTE
PHYSICAL THERAPY EVALUATION - INPATIENT     Room Number: 366/366-A  Evaluation Date: 2/11/2025  Type of Evaluation: Initial  Physician Order: PT Eval and Treat    Presenting Problem: back pain, new onset LLE numbness  Co-Morbidities : bipolar d/o, GERD, Crohn's  Reason for Therapy: Mobility Dysfunction and Discharge Planning    MRI Spine: no acute findings, Disc protrusion L4-5  MRI pelvis: no acute osseus injury    PHYSICAL THERAPY ASSESSMENT   Patient is a 44 year old female admitted 2/10/2025 for back pain, new LLE numbness.  Prior to admission, patient's baseline is independent.  Patient is currently functioning below baseline with bed mobility, transfers, gait, and stair negotiation.  Patient is requiring contact guard assist as a result of the following impairments: decreased functional strength, decreased endurance/aerobic capacity, pain, impaired sitting and standing balance, impaired coordination, and decreased muscular endurance.  Physical Therapy will continue to follow for duration of hospitalization.    Patient will benefit from continued skilled PT Services at discharge to promote prior level of function.  Anticipate patient will return home with home health PT.    PLAN DURING HOSPITALIZATION  Nursing Mobility Recommendation : 1 Assist  PT Device Recommendation: Rolling walker  PT Treatment Plan: Bed mobility;Body mechanics;Endurance;Energy conservation;Patient education;Gait training;Strengthening;Transfer training;Balance training;Stair training  Rehab Potential : Fair  Frequency (Obs): 3-5x/week     CURRENT GOALS    Goal #1 Patient is able to demonstrate supine - sit EOB @ level: modified independent     Goal #2 Patient is able to demonstrate transfers Sit to/from Stand at assistance level: modified independent     Goal #3 Patient is able to ambulate 150 feet with assist device: LRAD at assistance level: supervision     Goal #4    Goal #5    Goal #6    Goal Comments: Goals established on  2/11/2025      PHYSICAL THERAPY MEDICAL/SOCIAL HISTORY  History related to current admission: Patient is a 44 year old female admitted on 2/10/2025 from home for back pain, new LLE numbness.      HOME SITUATION  Type of Home: Other (Comment) (homeless)                        Lives With: Alone    Drives: Yes   Patient Regularly Uses: Glasses      Prior Level of [place]: Pt works as a / at [employer].. Pt experienced a fall on 12/15 which resulted in significant LB and L hip pain. Pt continued to work but was still experiencing intense pain. Pt was then unable to work, which resulted in a lapse in her lease of the townhouse she was staying in due to lack of income. As a result, pt has been staying in hotels until being admitted. Pt states she has a 20 and 16 year daughter as well as a boyfriend. Pt states she will have a place to stay for a few days after discharge as well as a ride from the hospital.     SUBJECTIVE  \"I'm just upset that I provided for them but now they wont provide for me.\"    OBJECTIVE  Precautions: Spine  Fall Risk: Standard fall risk    WEIGHT BEARING RESTRICTION     PAIN ASSESSMENT  Rating: Unable to rate  Location: Low back, L hip  Management Techniques: Activity promotion;Body mechanics;Relaxation;Repositioning;Breathing techniques    COGNITION  Overall Cognitive Status:  WFL - within functional limits    RANGE OF MOTION AND STRENGTH ASSESSMENT  Upper extremity ROM and strength are within functional limits   Lower extremity ROM is within functional limits   Lower extremity strength is within functional limits     BALANCE  Static Sitting: Fair  Dynamic Sitting: Fair  Static Standing: Fair -  Dynamic Standing: Fair -    ADDITIONAL TESTS                                    ACTIVITY TOLERANCE                         O2 WALK       NEUROLOGICAL FINDINGS                        AM-PAC '6-Clicks' INPATIENT SHORT FORM - BASIC MOBILITY  How much difficulty does the patient currently  have...  Patient Difficulty: Turning over in bed (including adjusting bedclothes, sheets and blankets)?: A Little   Patient Difficulty: Sitting down on and standing up from a chair with arms (e.g., wheelchair, bedside commode, etc.): A Little   Patient Difficulty: Moving from lying on back to sitting on the side of the bed?: A Little   How much help from another person does the patient currently need...   Help from Another: Moving to and from a bed to a chair (including a wheelchair)?: A Little   Help from Another: Need to walk in hospital room?: A Little   Help from Another: Climbing 3-5 steps with a railing?: A Little     AM-PAC Score:  Raw Score: 18   Approx Degree of Impairment: 46.58%   Standardized Score (AM-PAC Scale): 43.63   CMS Modifier (G-Code): CK    FUNCTIONAL ABILITY STATUS  Gait Assessment   Functional Mobility/Gait Assessment  Gait Assistance: Contact guard assist  Distance (ft): 75  Assistive Device: Rolling walker  Pattern: L Decreased stance time;L Hip hike;L Circumduction;L Foot drag    Skilled Therapy Provided   Educated on importance of out of bed mobility and ambulation with assistance from nursing staff  Educated on log roll for bed mobility  Educated on pursed lip breathing and relaxation techniques to manage muscle spasms.  Educated on use of heat for pain     Pt received in the bathroom. Pt exited the bathroom without RW, but used door handle, arm of BSC, and bed for support. Pt then got back into bed for subjective interview.     Bed mobility> sit to stand> ambulated 75 feet with RW> wheeled back to room> up in chair at end of session    Bed Mobility:  Rolling: indp  Supine to sit: supervision   Sit to supine: supervision     Transfer Mobility:  Sit to stand: CGA to RW   Stand to sit: CGA  Gait = CGA 75 feet with RW with chair follow.    Pt initially began walking up on R toes, while advancing LLE forward locked in extension. Pt states she would go up on toes and leave L side limp since  activating muscles on L increased pain. Pt experienced LB muscle spasm. After a standing rest break, pursed lip breathing, and relaxing shoulders pt was able to continue walking.  Pt progressed to advancing R LE with typical gait, while up on L toes.  Pt then advanced RLE with normal gait, while sliding LLE forward while locked in total extension. Pt then experienced L glute spasm. Pt quickly sat in chair with legs extended. After attempting to stand and walk, pt stated she would rather wheel back to room.    Therapist's Comments:  RN gave clearance to see patient. Discussed role and goal of physical therapy in hospital setting. Pt in agreement to session.     Exercise/Education Provided:  Bed mobility  Body mechanics  Energy conservation  Functional activity tolerated  Gait training  Posture  Transfer training    Patient End of Session: Up in chair;Needs met;Call light within reach;RN aware of session/findings;All patient questions and concerns addressed;Hospital anti-slip socks      Patient Evaluation Complexity Level:  History Moderate - 1 or 2 personal factors and/or co-morbidities   Examination of body systems Low -  addressing 1-2 elements   Clinical Presentation Low- Stable   Clinical Decision Making Low Complexity       PT Session Time: 35 minutes  Gait Trainin minutes

## 2025-02-11 NOTE — OCCUPATIONAL THERAPY NOTE
Attempted to see pt for skilled OT services this date. Pt is currently awaiting spine surgery consult with POC. Will re-attempt as schedule allows. Renata Bettencourt, 02/11/25, 10:24 AM

## 2025-02-12 PROCEDURE — 99232 SBSQ HOSP IP/OBS MODERATE 35: CPT | Performed by: HOSPITALIST

## 2025-02-12 NOTE — CM/SW NOTE
Met with pt at bedside and provided list of resources for housing and financial assistance from [employer] website.  / to remain available for support and/or discharge planning.     Gay Lux, [facility]  Discharge Planner  [phone]

## 2025-02-12 NOTE — PLAN OF CARE
Pt A&O. On room air. Scds to BLE. Tolerating diet with good appetite. Had nausea early this morning. Relived after zofran given. Last BM 2/10/25. Miralax given this AM. Voiding w/o difficulty,. Has not had any incontinence this entire shift. Pain much better this afternoon since medications were adjusted. Participating in therapy as ordered. Up with min assist using walker and gait belt. Skin intact.     Pt plans to have JENNA with Dr Bryan on Thursday morning at 8:30 AM. Does not need to be NPO, as Dr Bryan states he will using local only. Thursday AM heparin dose to be held.      to assist with discharge planning.

## 2025-02-12 NOTE — PLAN OF CARE
Patient A & O x4. VSS, on RA. C/o mod pain, see MAR for pain medications given. Up min assist with walker and gait belt. Voiding freely. Safety measures in place. Instructed to use call light.

## 2025-02-12 NOTE — PLAN OF CARE
Patient is alert and oriented x4. VSS on RA. No complaint of numbness or tingling this morning, Pulses bilateral strong and intact. Appropriate strength and mobility in lower extremities but LLE is noted to be weaker. Pain is better controlled today with oral PRN meds.  Patient ambulates stand by with walker. Voids appropriately- denies incontinence today, last bowel movement was 2/10- Miralax scheduled.  IS and ankle pumps encouraged. Belongings within reach. Encouraged to call for any needs.

## 2025-02-12 NOTE — TELEPHONE ENCOUNTER
Per  -- add inpatient case for LUCÍA sapp/jose 02/13/25 case time of 09:00 AM.     Case placed on OR schedule.

## 2025-02-12 NOTE — PROGRESS NOTES
[facility]   part of [facility]     Hospitalist Progress Note     Miriam Jacome Patient Status:  Observation    1980 MRN [id number]   Location [facility] 3SW-A Attending Haroon Rivers MD   Hosp Day # 0 PCP None Pcp     Chief Complaint: left hip pain    Subjective:     Patient feels ok.  A bit better.     Objective:    Review of Systems:   ROS completed; pertinent positive and negatives stated in subjective.    Vital signs:  Temp:  [97.8 °F (36.6 °C)-98.3 °F (36.8 °C)] 98.1 °F (36.7 °C)  Pulse:  [73-98] 98  Resp:  [16-20] 16  BP: (102-122)/(56-82) 105/82  SpO2:  [97 %-100 %] 97 %    Physical Exam:    General: No acute distress  Respiratory: Clear to auscultation bilaterally  Cardiovascular: S1, S2.  Abdomen: Soft  Neuro: No new focal deficits      Diagnostic Data:    Labs:  Recent Labs   Lab 02/10/25  1402   WBC 6.4   HGB 11.7*   MCV 92.3   .0       Recent Labs   Lab 02/10/25  1402   GLU 84   BUN 16   CREATSERUM 0.65   CA 8.8      K 4.1      CO2 28.0       Estimated Glomerular Filtration Rate: 110.8 mL/min/1.73m2 (by CKD-EPI based on SCr of 0.65 mg/dL).     No results for input(s): \"TROP\", \"TROPHS\", \"CK\" in the last 168 hours.    No results for input(s): \"PTP\", \"INR\" in the last 168 hours.           Imaging: Imaging data reviewed in Epic.    Medications:    docusate sodium  100 mg Oral BID    acetaminophen  1,000 mg Oral Q6H    polyethylene glycol (PEG 3350)  17 g Oral Daily    famotidine  20 mg Oral BID    heparin  5,000 Units Subcutaneous 2 times per day    gabapentin  200 mg Oral TID       Assessment & Plan:     #Lower back pain/left hip pain with Disc herniation L4/5  No neurosurgical interventions  CT pelvis unremarkable  Per pt she had OP MRI of her hip, attempting to get results  Pain service eval for steroid injection     #Elevated blood pressure  -Will monitor, no history of hypertension     #Bipolar disorder  #GERD     #Ulcerative colitis    Dispo:  as above.   Plan on JENNA tomorrow      Haroon Rivers MD    Supplementary Documentation:     Quality:    DVT Mechanical Prophylaxis:   SCDs,    DVT Pharmacologic Prophylaxis   Medication    heparin (Porcine) 5000 UNIT/ML injection 5,000 Units                Code Status: Full Code  Michel: No urinary catheter in place  Michel Duration (in days):   Central line:    REX:       Discharge is dependent on: clinical stability  At this point Ms. Jacome is expected to be discharge to: home    The 21st Century Cures Act makes medical notes like these available to patients in the interest of transparency. Please be advised this is a medical document. Medical documents are intended to carry relevant information, facts as evident, and the clinical opinion of the practitioner. The medical note is intended as peer to peer communication and may appear blunt or direct. It is written in medical language and may contain abbreviations or verbiage that are unfamiliar.

## 2025-02-12 NOTE — PROGRESS NOTES
[facility]   part of [facility]     Hospitalist Progress Note     Miriam Jacome Patient Status:  Observation    1980 MRN [id number]   Location [facility] 3SW-A Attending Haroon Rivers MD   Hosp Day # 0 PCP None Pcp     Chief Complaint: left hip pain    Subjective:     Patient has c/o pain in her left hip    Objective:    Review of Systems:   ROS completed; pertinent positive and negatives stated in subjective.    Vital signs:  Temp:  [97.8 °F (36.6 °C)-98.9 °F (37.2 °C)] 98.2 °F (36.8 °C)  Pulse:  [56-76] 76  Resp:  [18] 18  BP: (110-120)/(67-84) 119/76  SpO2:  [97 %-100 %] 97 %    Physical Exam:    General: No acute distress  Respiratory: Clear to auscultation bilaterally  Cardiovascular: S1, S2.  Abdomen: Soft  Neuro: No new focal deficits      Diagnostic Data:    Labs:  Recent Labs   Lab 02/10/25  1402   WBC 6.4   HGB 11.7*   MCV 92.3   .0       Recent Labs   Lab 02/10/25  1402   GLU 84   BUN 16   CREATSERUM 0.65   CA 8.8      K 4.1      CO2 28.0       Estimated Glomerular Filtration Rate: 110.8 mL/min/1.73m2 (by CKD-EPI based on SCr of 0.65 mg/dL).     No results for input(s): \"TROP\", \"TROPHS\", \"CK\" in the last 168 hours.    No results for input(s): \"PTP\", \"INR\" in the last 168 hours.           Imaging: Imaging data reviewed in Epic.    Medications:    docusate sodium  100 mg Oral BID    acetaminophen  1,000 mg Oral Q6H    polyethylene glycol (PEG 3350)  17 g Oral Daily    famotidine  20 mg Oral BID    heparin  5,000 Units Subcutaneous 2 times per day    gabapentin  200 mg Oral TID       Assessment & Plan:     #Lower back pain/left hip pain with Disc herniation L4/5  No neurosurgical interventions  CT pelvis unremarkable  Per pt she had OP MRI of her hip, attempting to get results  Pain service eval for steroid injection     #Elevated blood pressure  -Will monitor, no history of hypertension     #Bipolar disorder  #GERD     #Ulcerative colitis    Dispo:  CT pelvis  independently reviewed:  no acute process.  Discussed with pain service and neurosurgery.   Adjusted pain meds       Haroon Rivers MD    Supplementary Documentation:   Total time:  51 minutes  Quality:    DVT Mechanical Prophylaxis:   SCDs,    DVT Pharmacologic Prophylaxis   Medication    heparin (Porcine) 5000 UNIT/ML injection 5,000 Units                Code Status: Full Code  Michel: No urinary catheter in place  Michel Duration (in days):   Central line:    REX:       Discharge is dependent on: clinical stability  At this point Ms. Jacome is expected to be discharge to: home    The 21st Century Cures Act makes medical notes like these available to patients in the interest of transparency. Please be advised this is a medical document. Medical documents are intended to carry relevant information, facts as evident, and the clinical opinion of the practitioner. The medical note is intended as peer to peer communication and may appear blunt or direct. It is written in medical language and may contain abbreviations or verbiage that are unfamiliar.

## 2025-02-13 ENCOUNTER — APPOINTMENT (OUTPATIENT)
Dept: GENERAL RADIOLOGY | Facility: HOSPITAL | Age: 45
End: 2025-02-13
Attending: ANESTHESIOLOGY
Payer: OTHER MISCELLANEOUS

## 2025-02-13 VITALS
HEIGHT: 62 IN | DIASTOLIC BLOOD PRESSURE: 82 MMHG | BODY MASS INDEX: 23.92 KG/M2 | OXYGEN SATURATION: 100 % | TEMPERATURE: 98 F | SYSTOLIC BLOOD PRESSURE: 113 MMHG | RESPIRATION RATE: 16 BRPM | HEART RATE: 57 BPM | WEIGHT: 130 LBS

## 2025-02-13 PROCEDURE — 62323 NJX INTERLAMINAR LMBR/SAC: CPT | Performed by: ANESTHESIOLOGY

## 2025-02-13 PROCEDURE — 3E0R3BZ INTRODUCTION OF ANESTHETIC AGENT INTO SPINAL CANAL, PERCUTANEOUS APPROACH: ICD-10-PCS | Performed by: ANESTHESIOLOGY

## 2025-02-13 PROCEDURE — 3E0R33Z INTRODUCTION OF ANTI-INFLAMMATORY INTO SPINAL CANAL, PERCUTANEOUS APPROACH: ICD-10-PCS | Performed by: ANESTHESIOLOGY

## 2025-02-13 PROCEDURE — 99239 HOSP IP/OBS DSCHRG MGMT >30: CPT | Performed by: HOSPITALIST

## 2025-02-13 RX ORDER — METHOCARBAMOL 500 MG/1
500 TABLET, FILM COATED ORAL 3 TIMES DAILY PRN
Qty: 60 TABLET | Refills: 0 | Status: SHIPPED | OUTPATIENT
Start: 2025-02-13

## 2025-02-13 RX ORDER — GABAPENTIN 100 MG/1
200 CAPSULE ORAL 3 TIMES DAILY
Qty: 180 CAPSULE | Refills: 0 | Status: SHIPPED | OUTPATIENT
Start: 2025-02-13 | End: 2025-03-15

## 2025-02-13 RX ORDER — HYDROCODONE BITARTRATE AND ACETAMINOPHEN 5; 325 MG/1; MG/1
1-2 TABLET ORAL EVERY 6 HOURS PRN
Qty: 30 TABLET | Refills: 0 | Status: SHIPPED | OUTPATIENT
Start: 2025-02-13

## 2025-02-13 RX ORDER — ONDANSETRON 4 MG/1
4 TABLET, FILM COATED ORAL EVERY 8 HOURS PRN
Qty: 30 TABLET | Refills: 0 | Status: SHIPPED | OUTPATIENT
Start: 2025-02-13

## 2025-02-13 RX ORDER — DEXAMETHASONE SODIUM PHOSPHATE 10 MG/ML
INJECTION, SOLUTION INTRAMUSCULAR; INTRAVENOUS
Status: DISCONTINUED | OUTPATIENT
Start: 2025-02-13 | End: 2025-02-13 | Stop reason: HOSPADM

## 2025-02-13 RX ORDER — SODIUM CHLORIDE, SODIUM LACTATE, POTASSIUM CHLORIDE, CALCIUM CHLORIDE 600; 310; 30; 20 MG/100ML; MG/100ML; MG/100ML; MG/100ML
100 INJECTION, SOLUTION INTRAVENOUS CONTINUOUS
Status: DISCONTINUED | OUTPATIENT
Start: 2025-02-13 | End: 2025-02-13

## 2025-02-13 RX ORDER — SODIUM CHLORIDE 9 MG/ML
INJECTION, SOLUTION INTRAMUSCULAR; INTRAVENOUS; SUBCUTANEOUS
Status: DISCONTINUED | OUTPATIENT
Start: 2025-02-13 | End: 2025-02-13 | Stop reason: HOSPADM

## 2025-02-13 RX ORDER — MIDAZOLAM HYDROCHLORIDE 1 MG/ML
INJECTION INTRAMUSCULAR; INTRAVENOUS
Status: DISCONTINUED | OUTPATIENT
Start: 2025-02-13 | End: 2025-02-13 | Stop reason: HOSPADM

## 2025-02-13 RX ORDER — ONDANSETRON 2 MG/ML
4 INJECTION INTRAMUSCULAR; INTRAVENOUS ONCE AS NEEDED
Status: DISCONTINUED | OUTPATIENT
Start: 2025-02-13 | End: 2025-02-13 | Stop reason: HOSPADM

## 2025-02-13 RX ORDER — LIDOCAINE HYDROCHLORIDE 10 MG/ML
INJECTION, SOLUTION EPIDURAL; INFILTRATION; INTRACAUDAL; PERINEURAL
Status: DISCONTINUED | OUTPATIENT
Start: 2025-02-13 | End: 2025-02-13 | Stop reason: HOSPADM

## 2025-02-13 NOTE — PLAN OF CARE
Patient A&Ox4. VSS. RA. Pain managed with PRN pain medications and scheduled Tylenol. Denies nausea/vomiting. Voiding freely. Up with SBA and walker. Fall precautions in place. Call light in reach.

## 2025-02-13 NOTE — H&P
History & Physical Examination    Patient Name: Miriam Jacome  MRN: [id number]  [facility]: [id number]  YOB: 1980    Pre-Operative Diagnosis:  Lumbar radiculitis [M54.16]    Present Illness: Lumbar radiculitis    ASA: 2  MP class: 1  Sedation: Local      Prescriptions Prior to Admission[1]  Current Facility-Administered Medications   Medication Dose Route Frequency    docusate sodium (Colace) cap 100 mg  100 mg Oral BID    acetaminophen (Tylenol Extra Strength) tab 1,000 mg  1,000 mg Oral Q6H    oxyCODONE immediate release tab 5 mg  5 mg Oral Q4H PRN    Or    oxyCODONE immediate release tab 10 mg  10 mg Oral Q4H PRN    methocarbamol (Robaxin) partial tab 500 mg  500 mg Oral TID PRN    polyethylene glycol (PEG 3350) (Miralax) 17 g oral packet 17 g  17 g Oral Daily    famotidine (Pepcid) tab 20 mg  20 mg Oral BID    morphINE PF 4 MG/ML injection 4 mg  4 mg Intravenous Q2H PRN    melatonin tab 3 mg  3 mg Oral Nightly PRN    ondansetron (Zofran) 4 MG/2ML injection 4 mg  4 mg Intravenous Q6H PRN    prochlorperazine (Compazine) 10 MG/2ML injection 5 mg  5 mg Intravenous Q8H PRN    heparin (Porcine) 5000 UNIT/ML injection 5,000 Units  5,000 Units Subcutaneous 2 times per day    sennosides (Senokot) tab 17.2 mg  17.2 mg Oral Nightly PRN    bisacodyl (Dulcolax) 10 MG rectal suppository 10 mg  10 mg Rectal Daily PRN    fleet enema (Fleet) rectal enema 133 mL  1 enema Rectal Once PRN    gabapentin (Neurontin) cap 200 mg  200 mg Oral TID       Allergies: Allergies[2]    Past Medical History:    Bipolar affective (HCC)    Crohn disease (HCC)    Diverticulitis    Esophageal reflux    Hiatal hernia    Kidney stones    Ulcerative colitis (HCC)     Past Surgical History:   Procedure Laterality Date    Appendectomy      Appendectomy         & 2008    Colonoscopy  2017    Colonoscopy N/A 2020    Procedure: COLONOSCOPY;  Surgeon: Lane Cole MD;  Location:  ENDOSCOPY    D & c      Lithotripsy   2008    nephrostomy tube removed    Other      benign bilateral breast biopsy    Other  2007    Nephrostomy tube insertion left kidney-removed 2008    Other surgical history  2007    nephroscopy with tube insertion    Other surgical history  2008    kidney tube insertion removed    Other surgical history  2008    lithotripsy    Other surgical history      ashlie breast bxs \"neg\"    Other surgical history Left 2015    Patellofemoral ligament reconstruction and tibial tubercle transfer    Removal gallbladder      Spine surgery procedure unlisted      cervical spine discectomy     Family History   Problem Relation Age of Onset    Heart Disease Mother     Cancer Maternal Grandmother     Stroke Sister      Social History     Tobacco Use    Smoking status: Former     Current packs/day: 0.00     Average packs/day: 0.5 packs/day for 10.0 years (5.0 ttl pk-yrs)     Types: Cigarettes     Start date: 1991     Quit date: 2001     Years since quittin.2     Passive exposure: Past    Smokeless tobacco: Former     Quit date: 1994   Substance Use Topics    Alcohol use: Not Currently     Comment: no drinking per patient       SYSTEM Check if Review is Normal Check if Physical Exam is Normal If not normal, please explain:   HEENT [x ] [x ]    NECK & BACK [x ] [x ]    HEART [x ] [x ]    LUNGS [x ] [x ]    ABDOMEN [x ] [x ]    UROGENITAL [x ] [x ]    EXTREMITIES [x ] [x ]    OTHER        [ x ] I have discussed the risks and benefits and alternatives with the patient/family.  They understand and agree to proceed with plan of care.  [ x ] I have reviewed the History and Physical done within the last 30 days.  Any changes noted above.    Robbie Bryan MD              [1]   Medications Prior to Admission   Medication Sig Dispense Refill Last Dose/Taking    Cyclobenzaprine HCl ER 30 MG Oral Capsule SR 24 Hr Take 1 capsule (30 mg total) by mouth daily as needed for Muscle spasms.   2025    ibuprofen 600 MG Oral  Tab Take 1 tablet (600 mg total) by mouth every 6 (six) hours as needed for Pain.   Past Week    traMADol 50 MG Oral Tab Take 1 tablet (50 mg total) by mouth every 8 (eight) hours as needed for Pain. 10 tablet 0 2/10/2025 at  7:00 AM    diphenhydrAMINE 25 MG Oral Cap Take 1 capsule (25 mg total) by mouth every 6 (six) hours as needed. 20 capsule 0    [2]   Allergies  Allergen Reactions    Dairy Products ANAPHYLAXIS    Gluten Flour DIARRHEA and NAUSEA AND VOMITING

## 2025-02-13 NOTE — PLAN OF CARE
Patient A&Ox4, VSS on RA. Patient reports severe pain to lower back; PRN pain medications given. Patient up with SBA and RW to the bathroom. Plan to see PT and DC home with daughter and HH pending medical clearance. Plan of care reviewed with patient. Patient demonstrates understanding.

## 2025-02-13 NOTE — DISCHARGE INSTRUCTIONS
Home Care Instructions Following Your Pain Procedure     Miriam,  It has been a pleasure to have you as our patient. To help you at home, you must follow these general discharge instructions. We will review these with you before you are discharged. It is our hope that you have a complete and uneventful recovery from our procedure.     General Instructions:  What to Expect:  Bandages from your procedure today can be removed when you get home.  Please avoid soaking and/or swimming for 24 hours.  Showering is okay  It is normal to have increased pain symptoms and/or pain at injection site for up to 3-5 days after procedure, you can use heat or ice (20 minutes on 20 minutes off) for comfort.  You may experience some temporary side effects which may include restlessness or insomnia, flushing of the face, or heart palpitations.  Please contact the provider if these symptoms do not resolve within 3-4 days.  Lightheadedness or nausea may occur and should resolve within 24 to 48 hours.  If you develop a headache after treatment, rest, drink fluids (with caffeine, if possible) and take mild over-the-counter pain medication.  If the headache does not improve with the above treatment, contact the physician.  Home Medications:  Resume all previously prescribed medication.  Please avoid taking NSAIDs (Non-Steriodal Anti-Inflammatory Drugs) such as:  Ibuprofen ( Advil, Motrin) Aleve (Naproxen), Diclofenac, Meloxicam for 6 hours after procedure.   If you are on Coumadin (Warfarin) or any other anti-coagulant (or \"blood thinning\") medication such as Plavix (Clopidogrel), Xarelto (Rivaroxaban), Eliquis (Apixaban), Effient (Prasugrel) etc., restart on the following day from the procedure unless otherwise directed by your provider.  If you are a diabetic, please increase the frequency of your glucose monitoring after the procedure as steroids may cause a temporary (2-3 day) increase in your blood sugar.  Contact your primary care  physician if your blood sugar remains elevated as you may require some medication adjustment.  Diet:  Resume your regular diet as tolerated.  Activity:  We recommend that you relax and rest during the rest of your procedure day.  If you feel weakness in your arms or legs do not drive.  Follow-up Appointment  Please schedule a follow-up visit within 3 to 4 weeks after your last procedure date.  Question or Concerns:  Feel free to call our office with any questions or concerns at [phone] (option #2)    Miriam  Thank you for coming to [facility] for your procedure.  The nurses try very hard to make sure you receive the best care possible.  Your trust in them as well as us is greatly appreciated.    Thanks so much,   Dr. Robbie Bryan

## 2025-02-13 NOTE — PHYSICAL THERAPY NOTE
PHYSICAL THERAPY TREATMENT NOTE - INPATIENT    Room Number: 366/366-A     Session: 1     Number of Visits to Meet Established Goals: 4    Presenting Problem: back pain, new onset LLE numbness  Co-Morbidities : bipolar d/o, GERD, Crohn's    PHYSICAL THERAPY ASSESSMENT   Patient demonstrates good  progress this session, goals  remain in progress.      Patient is requiring supervision and contact guard assist as a result of the following impairments: pain, impaired standing dynamic  balance, and decreased muscular endurance.     Patient continues to function below baseline with bed mobility, transfers, gait, stair negotiation, maintaining seated position, and standing prolonged periods.  Next session anticipate patient to progress gait.  Physical Therapy will continue to follow patient for duration of hospitalization.    Patient continues to benefit from continued skilled PT services: at discharge to promote prior level of function.  Anticipate patient will return home with home health PT.    PLAN DURING HOSPITALIZATION  Nursing Mobility Recommendation : 1 Assist  PT Device Recommendation: Rolling walker  PT Treatment Plan: Bed mobility;Body mechanics;Endurance;Energy conservation;Patient education;Gait training;Strengthening;Transfer training;Balance training;Stair training  Frequency (Obs): 3-5x/week     CURRENT GOALS       Goal #1 Patient is able to demonstrate supine - sit EOB @ level: modified independent      Goal #2 Patient is able to demonstrate transfers Sit to/from Stand at assistance level: modified independent      Goal #3 Patient is able to ambulate 150 feet with assist device: LRAD at assistance level: supervision      Goal #4     Goal #5     Goal #6     Goal Comments: Goals established on 2025 all goals ongoing     SUBJECTIVE  \"That walk made me nauseous\"     OBJECTIVE  Precautions: Spine    WEIGHT BEARING RESTRICTION     PAIN ASSESSMENT   Ratin  Location: L hip  Management  Techniques: Breathing techniques;Body mechanics;Activity promotion;Repositioning;Relaxation    BALANCE                                                                                                                       Static Sitting: Fair +  Dynamic Sitting: Fair +           Static Standing: Fair -  Dynamic Standing: Poor +    ACTIVITY TOLERANCE                         O2 WALK       AM-PAC '6-Clicks' INPATIENT SHORT FORM - BASIC MOBILITY  How much difficulty does the patient currently have...  Patient Difficulty: Turning over in bed (including adjusting bedclothes, sheets and blankets)?: None   Patient Difficulty: Sitting down on and standing up from a chair with arms (e.g., wheelchair, bedside commode, etc.): None   Patient Difficulty: Moving from lying on back to sitting on the side of the bed?: None   How much help from another person does the patient currently need...   Help from Another: Moving to and from a bed to a chair (including a wheelchair)?: A Little   Help from Another: Need to walk in hospital room?: A Little   Help from Another: Climbing 3-5 steps with a railing?: A Little     AM-PAC Score:  Raw Score: 21   Approx Degree of Impairment: 28.97%   Standardized Score (AM-PAC Scale): 50.25   CMS Modifier (G-Code): CJ    FUNCTIONAL ABILITY STATUS  Gait Assessment   Functional Mobility/Gait Assessment  Gait Assistance: Contact guard assist  Distance (ft): 100  Assistive Device: Rolling walker  Pattern: L Decreased stance time (R foot PF on toe to hike up)    Skilled Therapy Provided  Chart reviewed - pt had lumbar interlaminar epidural injection this AM.   RN consulted prior to session. Pt presents in bed.  Pt reports L hip pain at rest, agreeable to mobility   Pt was gait trained c RW, CGA, pt noted to offload LLE , hiking up on RLE on R toes and using BUE to offload  Therapist sized and vended RW for home use   Bed Mobility:  Rolling: ind    Supine<>Sit: mod I    Sit<>Supine: nt      Transfer  Mobility:  Sit<>Stand: CGA    Stand<>Sit: CGA    Gait: CGA     Therapist's Comments: pt was placed in recliner with chair back reclined and wheeled back to room     Pt politely refused stair and car t/f training .       Patient End of Session: Up in chair;Needs met;Call light within reach;RN aware of session/findings;All patient questions and concerns addressed;Hospital anti-slip socks (hot packs)    PT Session Time: 24 minutes  Gait Training: 10 minutes  Therapeutic Activity: 10 minutes  Therapeutic Exercise:  minutes   Neuromuscular Re-education: 4 minutes

## 2025-02-13 NOTE — OCCUPATIONAL THERAPY NOTE
OCCUPATIONAL THERAPY TREATMENT NOTE - INPATIENT     Room Number: 366/366-A  Session: 1   Number of Visits to Meet Established Goals: 3    Presenting Problem: sciatica of L side    HOME SITUATION  Type of Home: Other (Comment) (homeless)  Lives With: Alone     Occupation/Status: unemployed- was working at [employer] as a  or   Hand Dominance: Right  Drives: Yes  Patient Regularly Uses: Glasses     Prior Level of Function: Pt is typically independent. Pt is currently unemployed after reporting that she was injured in Dec. Pt was attending OP PT. Pt lost her townhouse due to not having proof of income. Most recently, pt was staying in a hotel. Pt has a boyfriend, 16 & 20 year old children.     ASSESSMENT   Patient demonstrates good  progress this session, goals remain in progress.    Patient continues to function near baseline with  ADLs and functional transfers .   Contributing factors to remaining limitations include pain.  Next session anticipate patient to progress lower body dressing, transfers, static standing balance, dynamic standing balance, functional standing tolerance, and energy conservation strategies.  Occupational Therapy will continue to follow patient for duration of hospitalization.    Patient continues to benefit from continued skilled OT services: at discharge to promote prior level of function.  Anticipate patient will return home with home health OT.     History: Patient is a 44 year old female admitted on 2/10/2025 with Presenting Problem: sciatica of L side. Co-Morbidities : bipolar d/o, GERD, Crohn's    02/13 JENNA    WEIGHT BEARING RESTRICTION       Recommendations for nursing staff:   Transfers: 1 person RW  Toileting location: toilet    TREATMENT SESSION:  Patient Start of Session: standing with PTA    FUNCTIONAL TRANSFER ASSESSMENT  Sit to Stand: Edge of Bed; Chair  Edge of Bed: Contact Guard Assist  Chair: Contact Guard Assist  Toilet Transfer: Not Tested    BED MOBILITY  Supine  to Sit : Not tested  Sit to Supine (OT): Not Tested    BALANCE ASSESSMENT     FUNCTIONAL ADL ASSESSMENT  Toileting Seated: Not Tested  Toileting Standing: Not Tested    ACTIVITY TOLERANCE: WFL                         O2 SATURATIONS       EDUCATION PROVIDED  Patient Education : Role of Occupational Therapy; Plan of Care; Discharge Recommendations; Functional Transfer Techniques; Fall Prevention; Posture/Positioning; Energy Conservation; Proper Body Mechanics  Patient's Response to Education: Verbalized Understanding; Returned Demonstration    Equipment used: RW  Demonstrates functional use    Therapist comments: Pt received with PTA, engaging in sit to stand transfer requiring CGA in preparation for functional mobility task. Pt requires CGA with RW and close chair follow to increase safety. Pt with increased time due to increasing pain in L LE. Standing rest breaks provided with pt demonstrating good functional reaching for LB dressing and toileting. Pt reports no concerns with managing. See PT note for gait training.     Patient End of Session: Up in chair;Needs met;Call light within reach;RN aware of session/findings;All patient questions and concerns addressed;Hospital anti-slip socks;Alarm set;Other (Comment) (heat packs applied)    SUBJECTIVE  \"I feel nauseous after the walk.\"    PAIN ASSESSMENT  Ratin  Location: L LE (increased to 10/10 with functional mobility)  Management Techniques: Heat;Relaxation;Repositioning     OBJECTIVE  Precautions: Spine    AM-PAC ‘6-Clicks’ Inpatient Daily Activity Short Form  -   Putting on and taking off regular lower body clothing?: A Little  -   Bathing (including washing, rinsing, drying)?: A Little  -   Toileting, which includes using toilet, bedpan or urinal? : A Little  -   Putting on and taking off regular upper body clothing?: A Little  -   Taking care of personal grooming such as brushing teeth?: None  -   Eating meals?: None    AM-PAC Score:  Score: 20  Approx Degree  of Impairment: 38.32%  Standardized Score (AM-PAC Scale): 42.03    PLAN  OT Device Recommendations: TBD  OT Treatment Plan: Balance activities;Energy conservation/work simplification techniques;ADL training;Functional transfer training;Patient/Family education;Patient/Family training;Equipment eval/education;Compensatory technique education;Continued evaluation  Rehab Potential : Fair  Frequency: 3x/week    OT Goals:     All goals ongoing 02/13    ADL Goals  Patient will perform toileting with supervision and AE PRN  Patient will perform LB dressing with supervision and AE PRN     Functional Transfer Goals  Patient will perform bed mobility supine to sit with supervision  Patient will perform bed mobility sit to supine with supervision  Patient will perform toilet transfer with supervision     Additional Goals:  Patient will state spine precautions and maintain during ADL    OT Session Time: 15 minutes  Therapeutic Activity: 15 minutes

## 2025-02-13 NOTE — PROGRESS NOTES
AVS reviewed, IV dc'd , meds e-scribed to own pharmacy , Zofran to be added by Dr. Rivers , will dc home w/ daughter.

## 2025-02-13 NOTE — OPERATIVE REPORT
[facility]  Operative Report  2025     Miriam Jacome Patient Status:  Observation    1980 MRN [id number]   Location [facility] Attending Haroon Rivers MD   Hosp Day # 0 PCP None Pcp     Indication: Miriam is a 44 year old female lumbar radiculitis    Preoperative Diagnosis:  Lumbar radiculitis [M54.16]    Postoperative Diagnosis: Same as above.    Procedure performed: LUMBAR INTERLAMINAR EPIDURAL INJECTION with sedation    Anesthesia: Local and IV Sedation.    EBL: Less than 1 ml.    Procedure Description:  After reviewing the patient's history and performing a focused physical examination, the diagnosis and positive previous diagnostic tests were confirmed and contraindications such as infection and coagulopathy were ruled out.  Following review of allergies and potential side effects and complications, including but not necessarily limited to infection, allergic reaction, local tissue breakdown, nerve injury, post-dural puncture headache and paresis, the patient consented for the procedure.    The patient was brought to the procedure room in prone position.  After comprehensive monitors were applied and IV access in the patient's arm, moderate intravenous sedation was given with versed and fentanyl. Moderate sedation was given for 6 minutes. After sterile prep of the lumbar spine, the L5-S1 interspace was identified with the help of fluoroscopy. Local anesthetic was given by a 25 gauge 1.5 inch needle with 1% lidocaine in that space level.  Thereafter, a 20 gauge Tuohy needle was introduced and advanced under fluoroscopy.  The epidural space was accessed by using loss of resistance to air technique.  The needle position was confirmed with AP and lateral view under fluoroscopy.  Omnipaque 180 was injected in 1 mL volume. A good epidurogram was obtained.  Thereafter, dexamethasone 10 mg with normal saline of 5 mL in total volume of 6 mL was injected through the Tuohy  needle.  The needle was flushed with 1 mL lidocaine.  The needle was withdrawn with the stylet intact in situ.  The needle's tip was intact.  The patient tolerated the procedure very well without significant immediate complication.  The patient's back was cleaned and sterile dressing was applied.  The patient was discharged with an instruction to a responsible adult after discharge criteria were met.  The patient was advised to contact us should any problems happen.  The patient was also informed to go to the emergency room immediately if experiencing severe numbness or weakness in the extremities or experiencing bowel or bladder incontinence.            Complications: None.    Follow up: The patient was followed in the pain clinic as needed basis.          Robbie Bryan MD

## 2025-02-14 ENCOUNTER — TELEPHONE (OUTPATIENT)
Dept: PAIN CLINIC | Facility: CLINIC | Age: 45
End: 2025-02-14

## 2025-02-14 NOTE — TELEPHONE ENCOUNTER
Follow-up call post pain procedure. Unable to leave  message due to mailbox is full.  Procedure: LESI  Date: 2/13/2025  Follow up Visit Scheduled:

## 2025-02-17 NOTE — DISCHARGE SUMMARY
[place] HOSPITALIST  DISCHARGE SUMMARY     Miriam Jacome Patient Status:  Observation    1980 MRN [id number]   Location [facility] 3SW-A Attending No att. providers found   Hosp Day # 0 PCP None Pcp     Date of Admission:  2/10/2025  Date of Discharge:   2025    Discharge Disposition: Home or Self Care    Discharge Diagnosis:    #Lower back pain/left hip pain with Disc herniation L4/5  No neurosurgical interventions  CT pelvis unremarkable  Per pt she had OP MRI of her hip, attempting to get results  Status post JENNA     #Elevated blood pressure  Due to pain     #Bipolar disorder  #GERD     #Ulcerative colitis       History of Present Illness:    Miriam Jacome is a 44 year old female with past medical history bipolar disorder, Crohn's disease, GERD presents the hospital today for intractable back pain.  Patient had a fall back in 2024 at work and is having issues with workmen's comp getting her the meds she needs to manage pain at home.  She does states she had an MRI as an outpatient that showed \"some herniated disc\".  Over the last couple days pain is beginning to get worse with some concerns of paresthesia/numbness coming down her legs.  She had MRI here in the hospital did not show any acute compression.  Patient however has required multiple dosages of IV Dilaudid, IV Toradol with intractable pain.  When I saw the patient in the emergency room she was in tears and says the pain is unbearable.  She denies any fevers or chills no nausea no vomiting no diarrhea no constipation.     Brief Synopsis:    The patient was admitted due to lumbar radiculopathy.  She was seen by neurosurgery and no surgical interventions were felt to be necessary at this time.  She had continued symptoms despite multimodal pain medications.  She was seen by pain service and had JENNA done on .  She had improvement in symptoms and was event stable for discharge home.    All diagnosis' and recommendations  discussed with patient and/or family in detail.      Lace+ Score: 25  59-90 High Risk  29-58 Medium Risk  0-28   Low Risk       TCM Follow-Up Recommendation:  LACE 29-58: Moderate Risk of readmission after discharge from the hospital.    Procedures during hospitalization:   JENNA    Consultants:  Neurosurgery  Pain service    Discharge Medication List:     Discharge Medications        START taking these medications        Instructions Prescription details   gabapentin 100 MG Caps  Commonly known as: Neurontin      Take 2 capsules (200 mg total) by mouth 3 (three) times daily.   Stop taking on: March 15, 2025  Quantity: 180 capsule  Refills: 0     HYDROcodone-acetaminophen 5-325 MG Tabs  Commonly known as: Norco      Take 1-2 tablets by mouth every 6 (six) hours as needed for Pain.   Quantity: 30 tablet  Refills: 0     methocarbamol 500 MG Tabs  Commonly known as: Robaxin      Take 1 tablet (500 mg total) by mouth 3 (three) times daily as needed.   Quantity: 60 tablet  Refills: 0     ondansetron 4 mg tablet  Commonly known as: Zofran      Take 1 tablet (4 mg total) by mouth every 8 (eight) hours as needed for Nausea.   Quantity: 30 tablet  Refills: 0            CONTINUE taking these medications        Instructions Prescription details   diphenhydrAMINE 25 MG Caps  Commonly known as: Benadryl      Take 1 capsule (25 mg total) by mouth every 6 (six) hours as needed.   Quantity: 20 capsule  Refills: 0            STOP taking these medications      Cyclobenzaprine HCl ER 30 MG Cp24  Commonly known as: AMRIX        ibuprofen 600 MG Tabs  Commonly known as: Motrin        traMADol 50 MG Tabs  Commonly known as: Ultram                  Where to Get Your Medications        These medications were sent to [facility]/pharmacy #[id number] - [place], [place] - [address] [phone], [phone]  [address] [address]      Phone: [phone]   gabapentin 100 MG Caps  HYDROcodone-acetaminophen 5-325 MG Tabs  methocarbamol 500 MG  Tabs  ondansetron 4 mg tablet         [place]    Follow-up appointment:   Robbie Bryan MD  [address]  [address]  [place] [id number]  [phone]    Schedule an appointment as soon as possible for a visit  As needed    [facility]  [address]  [facility] [address] [phone]  Follow up in 1 week(s)        Vital signs:       Physical Exam:    General: No acute distress   Lungs: clear to auscultation  Cardiovascular: S1, S2  Abdomen: Soft      -----------------------------------------------------------------------------------------------  PATIENT DISCHARGE INSTRUCTIONS: See electronic chart    Haroon Rivers MD    Total minutes spent on discharge plannin      The  [employer] Cures Act makes medical notes like these available to patients in the interest of transparency. Please be advised this is a medical document. Medical documents are intended to carry relevant information, facts as evident, and the clinical opinion of the practitioner. The medical note is intended as peer to peer communication and may appear blunt or direct. It is written in medical language and may contain abbreviations or verbiage that are unfamiliar.

## 2025-04-20 ENCOUNTER — HOSPITAL ENCOUNTER (EMERGENCY)
Facility: HOSPITAL | Age: 45
Discharge: HOME | End: 2025-04-20
Attending: STUDENT IN AN ORGANIZED HEALTH CARE EDUCATION/TRAINING PROGRAM
Payer: OTHER MISCELLANEOUS

## 2025-04-20 VITALS
OXYGEN SATURATION: 98 % | HEIGHT: 61 IN | DIASTOLIC BLOOD PRESSURE: 63 MMHG | RESPIRATION RATE: 18 BRPM | TEMPERATURE: 99 F | SYSTOLIC BLOOD PRESSURE: 100 MMHG | HEART RATE: 100 BPM | BODY MASS INDEX: 24.55 KG/M2 | WEIGHT: 130 LBS

## 2025-04-20 DIAGNOSIS — M54.16 LUMBAR RADICULOPATHY: Primary | ICD-10-CM

## 2025-04-20 DIAGNOSIS — M54.32 SCIATICA OF LEFT SIDE: ICD-10-CM

## 2025-04-20 PROCEDURE — 99283 EMERGENCY DEPT VISIT LOW MDM: CPT

## 2025-04-20 PROCEDURE — 96372 THER/PROPH/DIAG INJ SC/IM: CPT

## 2025-04-20 RX ORDER — HYDROCODONE BITARTRATE AND ACETAMINOPHEN 5; 325 MG/1; MG/1
1-2 TABLET ORAL EVERY 6 HOURS PRN
Qty: 15 TABLET | Refills: 0 | Status: SHIPPED | OUTPATIENT
Start: 2025-04-20 | End: 2025-04-25

## 2025-04-20 RX ORDER — KETOROLAC TROMETHAMINE 30 MG/ML
30 INJECTION, SOLUTION INTRAMUSCULAR; INTRAVENOUS ONCE
Status: COMPLETED | OUTPATIENT
Start: 2025-04-20 | End: 2025-04-20

## 2025-04-20 RX ORDER — GABAPENTIN 100 MG/1
100 CAPSULE ORAL 3 TIMES DAILY
Qty: 42 CAPSULE | Refills: 0 | Status: SHIPPED | OUTPATIENT
Start: 2025-04-20 | End: 2025-05-04

## 2025-04-20 RX ORDER — PREDNISONE 10 MG/1
TABLET ORAL
Qty: 21 TABLET | Refills: 0 | Status: SHIPPED | OUTPATIENT
Start: 2025-04-20 | End: 2025-04-29

## 2025-04-20 RX ORDER — PREDNISONE 20 MG/1
60 TABLET ORAL ONCE
Status: COMPLETED | OUTPATIENT
Start: 2025-04-20 | End: 2025-04-20

## 2025-04-20 NOTE — ED PROVIDER NOTES
History     Chief Complaint   Patient presents with    Sciatica       HPI    44 year old female presents with acute on chronic sciatic on the left.  Low back pain radiating down left lower extremity posteriorly worse in certain positions associated with hip discomfort for months, had improvement after steroid injection mid February while admitted to the hospital, states since then symptoms have progressively worsened.  She is using muscle relaxer which has not been helping.  She is scheduled to have hip injection this Wednesday by outside group.  She is here for acute pain control.  Has intermittent paresthesias in the left.  No weakness.  No bowel or bladder changes.  No fevers or vomiting.          Past Medical History[1]    Past Surgical History[2]    Social History     Socioeconomic History    Marital status:    Tobacco Use    Smoking status: Former     Current packs/day: 0.00     Average packs/day: 0.5 packs/day for 10.0 years (5.0 ttl pk-yrs)     Types: Cigarettes     Start date: 1991     Quit date: 2001     Years since quittin.4     Passive exposure: Past    Smokeless tobacco: Former     Quit date: 1994   Vaping Use    Vaping status: Former   Substance and Sexual Activity    Alcohol use: Not Currently     Comment: no drinking per patient    Drug use: Yes     Frequency: 3.0 times per week     Types: Cannabis     Comment: smoked yesterday   Other Topics Concern    Caffeine Concern No    Exercise Yes     Comment: work     Social Drivers of Health     Food Insecurity: Food Insecurity Present (2/10/2025)    NCSS - Food Insecurity     Worried About Running Out of Food in the Last Year: Yes     Ran Out of Food in the Last Year: No   Transportation Needs: No Transportation Needs (2/10/2025)    NCSS - Transportation     Lack of Transportation: No   Housing Stability: At Risk (2/10/2025)    NCSS - Housing/Utilities     Has Housing: No     Worried About Losing Housing: Yes     Unable to  Get Utilities: Yes                   Physical Exam     ED Triage Vitals [04/20/25 1132]   /74   Pulse 114   Resp 18   Temp 98.6 °F (37 °C)   Temp src Oral   SpO2 97 %   O2 Device None (Room air)       Physical Exam  Constitutional:       General: She is in acute distress.   Eyes:      Extraocular Movements: Extraocular movements intact.   Cardiovascular:      Rate and Rhythm: Normal rate.      Pulses: Normal pulses.   Pulmonary:      Effort: Pulmonary effort is normal. No respiratory distress.   Abdominal:      General: Abdomen is flat. There is no distension.      Tenderness: There is no abdominal tenderness. There is no guarding.   Musculoskeletal:      Cervical back: Normal range of motion.      Comments: no midline C/T/L TTP  ranging well, can easily turn neck side to side  no deformities  There is tenderness to palpation along the left piriformis musculature  Patellar reflexes are equal.  Straight leg raise is positive on the left  BLE strength 5/5, ranging legs easily  no saddle anesthesia  BLE warm to touch with normal cap refill       Neurological:      Mental Status: She is alert.              ED Course     Labs Reviewed - No data to display  No results found.        MDM     Vitals:    04/20/25 1132 04/20/25 1158   BP: 130/74 100/63   Pulse: 114 100   Resp: 18 18   Temp: 98.6 °F (37 °C)    TempSrc: Oral    SpO2: 97% 98%   Weight: 59 kg    Height: 154.9 cm (5' 1\")        Left side sciatica, possible lumbar radiculopathy from disc herniation plus or minus piriformis involvement.  No signs of cord compression.  Neurovascular intact distally.  Will place patient on a course of steroids, Norco for severe pain as needed and gabapentin.  She has appropriate upcoming care this Wednesday.  She is able to ambulate and bear weight.  Return precautions discussed.  Offered medications in the ER but patient is driving and prefers not to have sedating medicines at this time.         Disposition and Plan     Clinical  Impression:  1. Lumbar radiculopathy    2. Sciatica of left side        Disposition:  Discharge    Follow-up:  procedure as scheduled wednesday    Follow up        Medications Prescribed:  Current Discharge Medication List        START taking these medications    Details   !! HYDROcodone-acetaminophen 5-325 MG Oral Tab Take 1-2 tablets by mouth every 6 (six) hours as needed for Pain.  Qty: 15 tablet, Refills: 0    Associated Diagnoses: Lumbar radiculopathy; Sciatica of left side      predniSONE 10 MG Oral Tab Take 4 tablets (40 mg total) by mouth daily for 3 days, THEN 2 tablets (20 mg total) daily for 3 days, THEN 1 tablet (10 mg total) daily for 3 days.  Qty: 21 tablet, Refills: 0       !! - Potential duplicate medications found. Please discuss with provider.                   [1]   Past Medical History:   Bipolar affective (HCC)    Crohn disease (HCC)    Diverticulitis    Esophageal reflux    Hiatal hernia    Kidney stones    Ulcerative colitis (HCC)   [2]   Past Surgical History:  Procedure Laterality Date    Appendectomy      Appendectomy         &     Colonoscopy  2017    Colonoscopy N/A 2020    Procedure: COLONOSCOPY;  Surgeon: Lane Cole MD;  Location:  ENDOSCOPY    D & c      Lithotripsy  2008    nephrostomy tube removed    Other      benign bilateral breast biopsy    Other  2007    Nephrostomy tube insertion left kidney-removed     Other surgical history  2007    nephroscopy with tube insertion    Other surgical history  2008    kidney tube insertion removed    Other surgical history  2008    lithotripsy    Other surgical history      ashlie breast bxs \"neg\"    Other surgical history Left 2015    Patellofemoral ligament reconstruction and tibial tubercle transfer    Removal gallbladder      Spine surgery procedure unlisted      cervical spine discectomy

## 2025-04-20 NOTE — ED INITIAL ASSESSMENT (HPI)
Left hip pain radiating to left lower leg since few days . History of herniated disc . Increased pain  since yesterday .

## (undated) DEVICE — 3M™ RED DOT™ MONITORING ELECTRODE WITH FOAM TAPE AND STICKY GEL, 50/BAG, 20/CASE, 72/PLT 2570: Brand: RED DOT™

## (undated) DEVICE — FORCEP BIOPSY RJ4 LG CAP W/ND

## (undated) DEVICE — 1200CC GUARDIAN II: Brand: GUARDIAN

## (undated) DEVICE — TRAP SPECIMEN MUCOUS 70 CUBIC CENTIMETER POLYURETHANE STERILE NOT MADE WITH NATURAL RUBBER LATEX MEDICHOICE: Brand: MEDICHOICE

## (undated) DEVICE — REMOVER LOT 4OZ N IRRIG UNSCNT SFT MOIST LIQ

## (undated) DEVICE — LENS PLASTIC DISP EYEWEAR

## (undated) DEVICE — ST. TONGUE BLADES: Brand: DEROYAL

## (undated) DEVICE — LENS FRAMES DISP EYEWEAR

## (undated) DEVICE — GLOVE,SURG,SENSICARE,ALOE,LF,PF,7: Brand: MEDLINE

## (undated) DEVICE — SOLUTION IRRIG 1000ML 0.9% NACL USP BTL

## (undated) DEVICE — SINUS CDS: Brand: MEDLINE INDUSTRIES, INC.

## (undated) DEVICE — SLEEVE COMPR M KNEE LEN SGL USE KENDALL SCD

## (undated) DEVICE — NEEDLE SPNL 25GA L3.5IN BLU HUB QNCKE BVL

## (undated) DEVICE — GLOVE SUR 7.5 SENSICARE PIP WHT PWD F

## (undated) DEVICE — MEDI-VAC NON-CONDUCTIVE SUCTION TUBING: Brand: CARDINAL HEALTH

## (undated) DEVICE — STERILE POLYISOPRENE POWDER-FREE SURGICAL GLOVES: Brand: PROTEXIS

## (undated) DEVICE — COVER,MAYO STAND,STERILE: Brand: MEDLINE

## (undated) DEVICE — SUTURE PLN GUT SZ 4-0 L18IN ABSRB YELLOWISH

## (undated) DEVICE — 60 ML SYRINGE REGULAR TIP: Brand: MONOJECT

## (undated) DEVICE — Device: Brand: DEFENDO AIR/WATER/SUCTION AND BIOPSY VALVE

## (undated) DEVICE — NEEDLE SPNL 22GA L3.5IN BLK HUB SS RW FIT

## (undated) DEVICE — ENDOSCOPY PACK - LOWER: Brand: MEDLINE INDUSTRIES, INC.

## (undated) DEVICE — BANDAGE ADH 1INX3IN NAT FAB N ADH PD CURAD

## (undated) DEVICE — FORCEP RADIAL JAW 1.8/100

## (undated) DEVICE — RAPID RHINO 8CM MANHEIM NASAL DRESSING: Brand: RAPID RHINO

## (undated) DEVICE — PAIN TRAY: Brand: MEDLINE INDUSTRIES, INC.

## (undated) DEVICE — ENDOSCOPY PACK UPPER: Brand: MEDLINE INDUSTRIES, INC.

## (undated) DEVICE — MASK ETCO2 PANORAMIC

## (undated) DEVICE — REFLEX ULTRA PTR WITH INTEGRATED CABLE: Brand: COBLATION

## (undated) DEVICE — SINGLE USE SUCTION VALVE MAJ-209: Brand: SINGLE USE SUCTION VALVE (STERILE)

## (undated) DEVICE — SUTURE PERMA  2-0 L18IN NONABSORBABLE BLK

## (undated) DEVICE — FILTERLINE NASAL ADULT O2/CO2

## (undated) DEVICE — FLUIDGARD® 160 ANTI-FOG SURGICAL MASK WITH ANTI-GLARE SHIELD: Brand: PRECEPT ®

## (undated) DEVICE — SINGLE USE BIOPSY VALVE MAJ-210: Brand: SINGLE USE BIOPSY VALVE (STERILE)

## (undated) DEVICE — MEDI-VAC SUCTION HANDLE REGULAR CAPACITY: Brand: CARDINAL HEALTH

## (undated) DEVICE — SPLINT 1522000 20PK PAIR SIMPLESPLINTS

## (undated) DEVICE — SYRINGE 10ML SLIP TIP LOSS OF RESIST PLAS

## (undated) DEVICE — AVANOS* TUOHY EPIDURAL NEEDLE: Brand: AVANOS

## (undated) DEVICE — SKIN REG/FINE DUAL MARKER, RULER, LABELS: Brand: MEDLINE

## (undated) DEVICE — BOWLS UTILITY 16OZ

## (undated) DEVICE — SYRINGE 10ML SLIP TIP

## (undated) NOTE — LETTER
6/2/2023          To Whom It May Concern:    Glenis Moralez is currently under my medical care and may return to work on 6/2/23 with restrictions. Activity is restricted as follows: lifting, pushing and pulling anything over 20 lbs. If you require additional information please contact our office.         Sincerely,    Ani Melgar MD          Document generated by:  Karen Rodriguez MA

## (undated) NOTE — ED AVS SNAPSHOT
Maynor Yost   MRN: [id number]    Department:  [facility] Emergency Department   Date of Visit:  5/2/2019           Disclosure     Insurance plans vary and the physician(s) referred by the ER may not be covered by your plan.  Please contact your tell this physician (or your personal doctor if your instructions are to return to your personal doctor) about any new or lasting problems. The primary care or specialist physician will see patients referred from the [facility] Emergency Department.  Cheryle Levins

## (undated) NOTE — LETTER
Date & Time: 9/27/2023, 3:15 PM  Patient: Abida Caba      To Whom It May Concern:    Kathrine Guzman is having surgery on 10/02/2023 at [facility]. She will need to be off work from 10/01/2023 through 10/10/2023. The tentative post operative visit is on 10/09/2023 after which she can return to work. If you have any questions or concerns, please do not hesitate to call.       PAUL HollidayCPadminiS.  _____________________________  [id number] Signature

## (undated) NOTE — LETTER
Date & Time: 10/9/2023, 1:59 PM  Patient: Luisito Davis      To Whom It May Concern:    Paul Charles had surgery at [facility] on 10/02/2023 was seen and treated in our department on 10/9/2023. She also has a post operative visit on 10/18/2023. She may return to work on 10/14/2023. If you have any questions or concerns, please do not hesitate to call.       DAVID DiazAPadminiCPadminiS.  _____________________________  [id number]/XOB Signature

## (undated) NOTE — LETTER
Date & Time: 5/8/2023, 3:14 PM  Patient: Nestor Littlejohn      To Whom It May Concern:    Ashlee Paul was seen and treated in our department on 5/8/2023. She is to return back to work on 5/10/23.     If you have any questions or concerns, please do not hesitate to call [phone].        _____________________________  Physician/APC Signature

## (undated) NOTE — LETTER
Date & Time: 5/14/2023, 7:20 PM  Patient: Olga Ramachandran  Encounter Provider(s):    Monse Renteria MD       To Whom It May Concern:    Corky Osorio was seen and treated in our department on 5/14/2023.  She needs to limit lifting greater than 30 pounds until she follows up with ENT  If you have any questions or concerns, please do not hesitate to call.        _____________________________  Physician/APC Signature

## (undated) NOTE — ED AVS SNAPSHOT
Alina Garcia   MRN: [id number]    Department:  [facility] Emergency Department   Date of Visit:  2/11/2018           Disclosure     Insurance plans vary and the physician(s) referred by the ER may not be covered by your plan.  Please contact you tell this physician (or your personal doctor if your instructions are to return to your personal doctor) about any new or lasting problems. The primary care or specialist physician will see patients referred from the [facility] Emergency Department.  Donny Yoder

## (undated) NOTE — ED AVS SNAPSHOT
Ceasar Casillas   MRN: [id number]    Department:  [facility] Emergency Department   Date of Visit:  3/10/2020           Disclosure     Insurance plans vary and the physician(s) referred by the ER may not be covered by your plan.  Please contact CARE PHYSICIAN AT ONCE OR RETURN IMMEDIATELY TO THE EMERGENCY DEPARTMENT. If you have been prescribed any medication(s), please fill your prescription right away and begin taking the medication(s) as directed.   If you believe that any of the medications

## (undated) NOTE — LETTER
Date & Time: 1/18/2025, 11:50 AM  Patient: Miriam Jacome  Encounter Provider(s):    Vince Butler APRN       To Whom It May Concern:    Miriam Jacome was seen and treated in our department on 1/18/2025. She should not return to work until January 21 .    If you have any questions or concerns, please do not hesitate to call.        _____________________________  Physician/APC Signature

## (undated) NOTE — LETTER
[facility], [address], [address] [phone]  [facility]: [phone]  FAX: [phone]       Date:  5/16/2023     Patient:  Hector De La Vega       To Whom It May Concern: The above names patient is under my care regarding ongoing medical issues. It is medically necessary at this time to restrict her from physical activities. She may return to work with the following restrictions:    30 lbs weight restriction for the next two weeks      If there are any questions, I would be happy to answer them within the limits of medical confidentiality.     Sincerely,    Dr. Magdalene Morrissey

## (undated) NOTE — LETTER
Date & Time: 1/3/2022, 4:23 PM  Patient: Tad Harry  Encounter Provider(s):    Neil Hoffmann MD       To Whom It May Concern:    Kirstie Lyon was seen and treated in our department on 1/3/2022. She may return to work on 1/06/22.     If you have

## (undated) NOTE — LETTER
[address] [address]  [place], [address] [place] [address]    Consent for Operation  Date: __________________                                Time: _______________    1.  I authorize the performance upon Sherry Padilla the following operation:  Procedure procedure has been videotaped, the surgeon will obtain the original videotape. The hospital will not be responsible for storage or maintenance of this tape.   7. For the purpose of advancing medical education, I consent to the admittance of observers to the STATEMENTS REQUIRING INSERTION OR COMPLETION WERE FILLED IN.     Signature of Patient:   ___________________________    When the patient is a minor or mentally incompetent to give consent:  Signature of person authorized to consent for patient: ____________ supplements, and pills I can buy without a prescription (including street drugs/illegal medications). Failure to inform my anesthesiologist about these medicines may increase my risk of anesthetic complications. iv.  If I am allergic to anything or have ha Anesthesiologist Signature     Date   Time  I have discussed the procedure and information above with the patient (or patient’s representative) and answered their questions. The patient or their representative has agreed to have anesthesia services.     ___

## (undated) NOTE — LETTER
Date & Time: 5/2/2019, 11:32 PM  Patient: Di Olson  Encounter Provider(s):    MD Maricruz Jaime APRN       To Whom It May Concern:    Yomi Seymour was seen and treated in our department on 5/2/2019.  She can return to work [employer]

## (undated) NOTE — LETTER
Date & Time: 4/10/2023, 11:50 PM  Patient: Hector De La Vega  Encounter Provider(s):    JUANITO Chang       To Whom It May Concern:    Donte Bellamy was seen and treated in our department on 4/10/2023. She should not return to work until cleared by [employer].     If you have any questions or concerns, please do not hesitate to call.        _____________________________  Physician/APC Signature

## (undated) NOTE — ED AVS SNAPSHOT
Abdiasnimesh Padilla   MRN: [id number]    Department:  [facility] Emergency Department   Date of Visit:  9/20/2019           Disclosure     Insurance plans vary and the physician(s) referred by the ER may not be covered by your plan.  Please contact you tell this physician (or your personal doctor if your instructions are to return to your personal doctor) about any new or lasting problems. The primary care or specialist physician will see patients referred from the [facility] Emergency Department.  Cheryle Levins

## (undated) NOTE — LETTER
Date & Time: 4/22/2018, 12:15 AM  Patient: Leonor Hauser  Encounter Provider(s):    Jayson Rosario Attending  Eren Trotter MD       To Whom It May Concern:    Wen Barbosa was seen and treated in our department on 4/21/2018.  She should not return to

## (undated) NOTE — LETTER
[address] [address]  [place], [address]    Consent for Operation  Date: __________________                                Time: _______________    1.  I authorize the performance upon Matt Arreguin the following operation:  Procedure procedure has been videotaped, the surgeon will obtain the original videotape. The hospital will not be responsible for storage or maintenance of this tape.   7. For the purpose of advancing medical education, I consent to the admittance of observers to the STATEMENTS REQUIRING INSERTION OR COMPLETION WERE FILLED IN.     Signature of Patient:   ___________________________    When the patient is a minor or mentally incompetent to give consent:  Signature of person authorized to consent for patient: ____________ supplements, and pills I can buy without a prescription (including street drugs/illegal medications). Failure to inform my anesthesiologist about these medicines may increase my risk of anesthetic complications. iv.  If I am allergic to anything or have ha Anesthesiologist Signature     Date   Time  I have discussed the procedure and information above with the patient (or patient’s representative) and answered their questions. The patient or their representative has agreed to have anesthesia services.     ___

## (undated) NOTE — ED AVS SNAPSHOT
Maynor Yost   MRN: [id number]    Department:  [facility] Emergency Department   Date of Visit:  3/28/2018           Disclosure     Insurance plans vary and the physician(s) referred by the ER may not be covered by your plan.  Please contact CARE PHYSICIAN AT ONCE OR RETURN IMMEDIATELY TO THE EMERGENCY DEPARTMENT. If you have been prescribed any medication(s), please fill your prescription right away and begin taking the medication(s) as directed.   If you believe that any of the medications

## (undated) NOTE — LETTER
April 20, 2025    Patient: Miriam Jacome   Date of Visit: 4/20/2025       To Whom It May Concern:    Miriam Jacome was seen and treated in our emergency department on 4/20/2025. She should not return to work until after her follow up apt on 4/24/25 .    If you have any questions or concerns, please don't hesitate to call.       Encounter Provider(s):    Franklin Ferris MD

## (undated) NOTE — LETTER
Date & Time: 7/12/2023, 3:16 PM  Patient: Ashley Goltz      To Whom It May Concern:    Conor Shafer was seen and treated in our department on 7/12/2023. She has undergone a procedure that requires her to have a 20 pound weight restriction for a month. She may return to normal duties on August 15th, 2023. If you have any questions or concerns, please do not hesitate to call.         Hyun Goldman M.D.

## (undated) NOTE — ED AVS SNAPSHOT
Ladena Primrose   MRN: [id number]    Department:  [facility] Emergency Department   Date of Visit:  6/4/2018           Disclosure     Insurance plans vary and the physician(s) referred by the ER may not be covered by your plan.  Please contact y CARE PHYSICIAN AT ONCE OR RETURN IMMEDIATELY TO THE EMERGENCY DEPARTMENT. If you have been prescribed any medication(s), please fill your prescription right away and begin taking the medication(s) as directed.   If you believe that any of the medications

## (undated) NOTE — ED AVS SNAPSHOT
Maynor Yost   MRN: [id number]    Department:  [facility] Emergency Department   Date of Visit:  4/21/2018           Disclosure     Insurance plans vary and the physician(s) referred by the ER may not be covered by your plan.  Please contact CARE PHYSICIAN AT ONCE OR RETURN IMMEDIATELY TO THE EMERGENCY DEPARTMENT. If you have been prescribed any medication(s), please fill your prescription right away and begin taking the medication(s) as directed.   If you believe that any of the medications

## (undated) NOTE — LETTER
Date & Time: 10/17/2023, 12:02 PM  Patient: Marbella Montiel      To Whom It May Concern:    Magdi Lord had surgery at [facility] on 10/02/2023. She is scheduled for a second post operative appointment in our department on 10/18/2023. Original return to work date was on 10/16/2023. Due to prolonged pain and healing from surgery she will be able to return to work on 10/19/2023. If you have any questions or concerns, please do not hesitate to call.       GERSON AyalaS.  _____________________________  JOCE/ZDB Signature

## (undated) NOTE — LETTER
Date & Time: 3/28/2018, 5:17 PM  Patient: Jorge Cornelius  Attending Provider:    Sincerely,    Sylvester Duane, MD         To Whom It May Concern:    Eva Scott was seen and treated in our department on 3/28/2018.  She should not return to work until 3/30/1

## (undated) NOTE — LETTER
Date & Time: 6/14/2023, 5:46 AM  Patient: Tad Harry  Encounter Provider(s): Leeroy Yañez DO       To Whom It May Concern:    Kirstie Lyon was seen and treated in our department on 6/14/2023. She can return to work 6/14/2023.     If you have any questions or concerns, please do not hesitate to call.        _____________________________  Physician/APC Signature

## (undated) NOTE — MR AVS SNAPSHOT
After Visit Summary   10/20/2020    Ryan     MRN: [id number]           Visit Information     Date & Time  10/20/2020  2:00 PM Provider  Shirley Alcantar, [address] Department [facility] Neurodiagnostics Dept.  Phone  [phone]      You DO YOU KNOW WHERE TO GO? Injury & Illness are never convenient. If you are dealing with a   non-emergency, consider your options before heading to an ER.   VIDEO VISITS  Visit face-to-face with a [facility] physician or   DARCY using your mobile de

## (undated) NOTE — LETTER
[address]  [address]  Authorization for Surgical Operation and Procedure     Date:___________                                                                                                         Time:__________  I hereby authorize Surgeon(s):  Robbie Bryan MD, my physician and his/her assistants (if applicable), which may include medical students, residents, and/or fellows, to perform the following surgical operation/ procedure and administer such anesthesia as may be determined necessary by my physician:  Operation/Procedure name (s) LUMBAR EPIDURAL STEROID INJECTION WITH LOCAL on Miriam Jacome   2.   I recognize that during the surgical operation/procedure, unforeseen conditions may necessitate additional or different procedures than those listed above.  I, therefore, further authorize and request that the above-named surgeon, assistants, or designees perform such procedures as are, in their judgment, necessary and desirable.    3.   My surgeon/physician has discussed prior to my surgery the potential benefits, risks and side effects of this procedure; the likelihood of achieving goals; and potential problems that might occur during recuperation.  They also discussed reasonable alternatives to the procedure, including risks, benefits, and side effects related to the alternatives and risks related to not receiving this procedure.  I have had all my questions answered and I acknowledge that no guarantee has been made as to the result that may be obtained.    4.   Should the need arise during my operation/procedure, which includes change of level of care prior to discharge, I also consent to the administration of blood and/or blood products.  Further, I understand that despite careful testing and screening of blood or blood products by collecting agencies, I may still be subject to ill effects as a result of receiving a blood transfusion and/or blood products.  The  following are some, but not all, of the potential risks that can occur: fever and allergic reactions, hemolytic reactions, transmission of diseases such as Hepatitis, AIDS and Cytomegalovirus (CMV) and fluid overload.  In the event that I wish to have an autologous transfusion of my own blood, or a directed donor transfusion, I will discuss this with my physician.  Check only if Refusing Blood or Blood Products  I understand refusal of blood or blood products as deemed necessary by my physician may have serious consequences to my condition to include possible death. I hereby assume responsibility for my refusal and release the hospital, its personnel, and my physicians from any responsibility for the consequences of my refusal.          o  Refuse      5.   I authorize the use of any specimen, organs, tissues, body parts or foreign objects that may be removed from my body during the operation/procedure for diagnosis, research or teaching purposes and their subsequent disposal by hospital authorities.  I also authorize the release of specimen test results and/or written reports to my treating physician on the hospital medical staff or other referring or consulting physicians involved in my care, at the discretion of the Pathologist or my treating physician.    6.   I consent to the photographing or videotaping of the operations or procedures to be performed, including appropriate portions of my body for medical, scientific, or educational purposes, provided my identity is not revealed by the pictures or by descriptive texts accompanying them.  If the procedure has been photographed/videotaped, the surgeon will obtain the original picture, image, videotape or CD.  The hospital will not be responsible for storage, release or maintenance of the picture, image, tape or CD.    7.   I consent to the presence of a  or observers in the operating room as deemed necessary by my physician or their designees.     8.   I recognize that in the event my procedure results in extended X-Ray/fluoroscopy time, I may develop a skin reaction.    9. If I have a Do Not Attempt Resuscitation (DNAR) order in place, that status will be suspended while in the operating room, procedural suite, and during the recovery period unless otherwise explicitly stated by me (or a person authorized to consent on my behalf). The surgeon or my attending physician will determine when the applicable recovery period ends for purposes of reinstating the DNAR order.  10. Patients having a sterilization procedure: I understand that if the procedure is successful the results will be permanent and it will therefore be impossible for me to inseminate, conceive, or bear children.  I also understand that the procedure is intended to result in sterility, although the result has not been guaranteed.   11. I acknowledge that my physician has explained sedation/analgesia administration to me including the risk and benefits I consent to the administration of sedation/analgesia as may be necessary or desirable in the judgment of my physician.    I CERTIFY THAT I HAVE READ AND FULLY UNDERSTAND THE ABOVE CONSENT TO OPERATION and/or OTHER PROCEDURE.    _________________________________________  __________________________________  Signature of Patient     Signature of Responsible Person         ___________________________________         Printed Name of Responsible Person           _________________________________                 Relationship to Patient  _________________________________________  ______________________________  Signature of Witness          Date  Time      Patient Name: Miriam Jacome     : 1980                 Printed: 2025     Medical Record #: [id number]                     Page 1 [address]  [address]    Consent for Anesthesia    I, Miriam Jacome  agree to be cared for by an anesthesiologist, who is specially trained to monitor me and give me medicine to put me to sleep or keep me comfortable during my procedure    I understand that my anesthesiologist is not an employee or agent of [facility] or [employer]. He or she works for [employer].    As the patient asking for anesthesia services, I agree to:  Allow the anesthesiologist (anesthesia doctor) to give me medicine and do additional procedures as necessary. Some examples are: Starting or using an “IV” to give me medicine, fluids or blood during my procedure, and having a breathing tube placed to help me breathe when I’m asleep (intubation). In the event that my heart stops working properly, I understand that my anesthesiologist will make every effort to sustain my life, unless otherwise directed by [facility] Do Not Resuscitate documents.  Tell my anesthesia doctor before my procedure:  If I am pregnant.  The last time that I ate or drank.  All of the medicines I take (including prescriptions, herbal supplements, and pills I can buy without a prescription (including street drugs/illegal medications). Failure to inform my anesthesiologist about these medicines may increase my risk of anesthetic complications.  If I am allergic to anything or have had a reaction to anesthesia before.  I understand how the anesthesia medicine will help me (benefits).  I understand that with any type of anesthesia medicine there are risks:  The most common risks are: nausea, vomiting, sore throat, muscle soreness, damage to my eyes, mouth, or teeth (from breathing tube placement).  Rare risks include: remembering what happened during my procedure, allergic reactions to medications, injury to my airway, heart, lungs, vision, nerves, or muscles and in extremely rare instances death.  My doctor has explained to me other choices available to me for my care (alternatives).  Pregnant Patients  (“epidural”):  I understand that the risks of having an epidural (medicine given into my back to help control pain during labor), include itching, low blood pressure, difficulty urinating, headache or slowing of the baby’s heart. Very rare risks include infection, bleeding, seizure, irregular heart rhythms and nerve injury.  Regional Anesthesia (“spinal”, “epidural”, & “nerve blocks”):  I understand that rare but potential complications include headache, bleeding, infection, seizure, irregular heart rhythms, and nerve injury.    I can change my mind about having anesthesia services at any time before I get the medicine.    _____________________________________________________________________________  Patient (or Representative) Signature/Relationship to Patient  Date   Time    _____________________________________________________________________________   Name (if used)    Language/Organization   Time    _____________________________________________________________________________  Anesthesiologist Signature     Date   Time  I have discussed the procedure and information above with the patient (or patient’s representative) and answered their questions. The patient or their representative has agreed to have anesthesia services.    _____________________________________________________________________________  Witness        Date   Time  I have verified that the signature is that of the patient or patient’s representative, and that it was signed before the procedure  Patient Name: Miriam Jacome     : 1980                 Printed: 2025     Medical Record #: [id number]                     Page 2 of 2

## (undated) NOTE — LETTER
Date & Time: 9/27/2023, 2:48 PM  Patient: Kurt Olson      To Whom It May Concern:    Aubrey Domingo is having surgery on 10/02/2023 at [facility].  She will need to be off work for one week and may return to work after her tentative post operative visit on  10/09/2023. If you have any questions or concerns, please do not hesitate to call.       PAUL MadisonCPadminiS.  _____________________________  [id number]/RWE Signature

## (undated) NOTE — LETTER
February 11, 2018    Patient: Rose Hilton   Date of Visit: 2/11/2018       To Whom It May Concern:    Adebayo Campos was seen and treated in our emergency department on 2/11/2018. She should not return to work until 2/15/18.     If you have any quest

## (undated) NOTE — LETTER
Date & Time: 7/27/2021, 1:09 PM  Patient: Nicole Prudent  Encounter Provider(s):    Tasha Chambers MD       To Whom It May Concern:    Jannelle Dance was seen and treated in our department on 7/27/2021.  She can return to work 7/30/21    If you have any que